# Patient Record
Sex: MALE | Race: WHITE | NOT HISPANIC OR LATINO | Employment: OTHER | URBAN - METROPOLITAN AREA
[De-identification: names, ages, dates, MRNs, and addresses within clinical notes are randomized per-mention and may not be internally consistent; named-entity substitution may affect disease eponyms.]

---

## 2017-01-05 ENCOUNTER — OFFICE VISIT - HEALTHEAST (OUTPATIENT)
Dept: INTERNAL MEDICINE | Facility: CLINIC | Age: 67
End: 2017-01-05

## 2017-01-05 DIAGNOSIS — J40 BRONCHITIS: ICD-10-CM

## 2017-02-14 ENCOUNTER — COMMUNICATION - HEALTHEAST (OUTPATIENT)
Dept: INTERNAL MEDICINE | Facility: CLINIC | Age: 67
End: 2017-02-14

## 2017-05-24 ENCOUNTER — COMMUNICATION - HEALTHEAST (OUTPATIENT)
Dept: INTERNAL MEDICINE | Facility: CLINIC | Age: 67
End: 2017-05-24

## 2017-08-28 ENCOUNTER — COMMUNICATION - HEALTHEAST (OUTPATIENT)
Dept: INTERNAL MEDICINE | Facility: CLINIC | Age: 67
End: 2017-08-28

## 2017-09-05 ENCOUNTER — COMMUNICATION - HEALTHEAST (OUTPATIENT)
Dept: INTERNAL MEDICINE | Facility: CLINIC | Age: 67
End: 2017-09-05

## 2017-09-20 ENCOUNTER — OFFICE VISIT - HEALTHEAST (OUTPATIENT)
Dept: INTERNAL MEDICINE | Facility: CLINIC | Age: 67
End: 2017-09-20

## 2017-09-20 DIAGNOSIS — I25.10 CORONARY ARTERY DISEASE INVOLVING NATIVE CORONARY ARTERY: ICD-10-CM

## 2017-09-20 DIAGNOSIS — I10 ESSENTIAL HYPERTENSION WITH GOAL BLOOD PRESSURE LESS THAN 140/90: ICD-10-CM

## 2017-09-20 DIAGNOSIS — E78.2 MIXED HYPERLIPIDEMIA: ICD-10-CM

## 2017-09-20 DIAGNOSIS — Z95.5 HISTORY OF CORONARY ARTERY STENT PLACEMENT: ICD-10-CM

## 2017-09-20 DIAGNOSIS — Z23 NEED FOR VACCINATION: ICD-10-CM

## 2017-09-20 LAB
CHOLEST SERPL-MCNC: 159 MG/DL
FASTING STATUS PATIENT QL REPORTED: YES
HDLC SERPL-MCNC: 27 MG/DL
LDLC SERPL CALC-MCNC: 107 MG/DL
TRIGL SERPL-MCNC: 127 MG/DL

## 2017-09-20 ASSESSMENT — MIFFLIN-ST. JEOR: SCORE: 1635.36

## 2017-09-21 ENCOUNTER — COMMUNICATION - HEALTHEAST (OUTPATIENT)
Dept: INTERNAL MEDICINE | Facility: CLINIC | Age: 67
End: 2017-09-21

## 2017-10-17 ENCOUNTER — COMMUNICATION - HEALTHEAST (OUTPATIENT)
Dept: SCHEDULING | Facility: CLINIC | Age: 67
End: 2017-10-17

## 2017-10-18 ENCOUNTER — OFFICE VISIT - HEALTHEAST (OUTPATIENT)
Dept: INTERNAL MEDICINE | Facility: CLINIC | Age: 67
End: 2017-10-18

## 2017-10-18 DIAGNOSIS — J06.9 URI (UPPER RESPIRATORY INFECTION): ICD-10-CM

## 2017-10-18 DIAGNOSIS — M79.10 MYALGIA: ICD-10-CM

## 2017-10-18 DIAGNOSIS — R05.9 COUGH: ICD-10-CM

## 2017-10-18 ASSESSMENT — MIFFLIN-ST. JEOR: SCORE: 1626.29

## 2017-11-28 ENCOUNTER — COMMUNICATION - HEALTHEAST (OUTPATIENT)
Dept: INTERNAL MEDICINE | Facility: CLINIC | Age: 67
End: 2017-11-28

## 2017-11-28 DIAGNOSIS — I25.10 CORONARY ARTERY DISEASE INVOLVING NATIVE CORONARY ARTERY: ICD-10-CM

## 2017-11-28 DIAGNOSIS — I10 ESSENTIAL HYPERTENSION: ICD-10-CM

## 2017-11-28 DIAGNOSIS — E78.2 MIXED HYPERLIPIDEMIA: ICD-10-CM

## 2017-11-28 DIAGNOSIS — Z95.5 HISTORY OF CORONARY ARTERY STENT PLACEMENT: ICD-10-CM

## 2018-01-25 ENCOUNTER — RECORDS - HEALTHEAST (OUTPATIENT)
Dept: ADMINISTRATIVE | Facility: OTHER | Age: 68
End: 2018-01-25

## 2018-02-05 ENCOUNTER — AMBULATORY - HEALTHEAST (OUTPATIENT)
Dept: INTERNAL MEDICINE | Facility: CLINIC | Age: 68
End: 2018-02-05

## 2018-02-05 ENCOUNTER — OFFICE VISIT - HEALTHEAST (OUTPATIENT)
Dept: INTERNAL MEDICINE | Facility: CLINIC | Age: 68
End: 2018-02-05

## 2018-02-05 DIAGNOSIS — F17.200 SMOKER: ICD-10-CM

## 2018-02-05 DIAGNOSIS — Z95.5 HISTORY OF CORONARY ARTERY STENT PLACEMENT: ICD-10-CM

## 2018-02-05 DIAGNOSIS — I10 ESSENTIAL HYPERTENSION: ICD-10-CM

## 2018-02-05 DIAGNOSIS — Z01.818 PREOP EXAM FOR INTERNAL MEDICINE: ICD-10-CM

## 2018-02-05 LAB
ATRIAL RATE - MUSE: 62 BPM
DIASTOLIC BLOOD PRESSURE - MUSE: NORMAL MMHG
INTERPRETATION ECG - MUSE: NORMAL
P AXIS - MUSE: 68 DEGREES
PR INTERVAL - MUSE: 144 MS
QRS DURATION - MUSE: 98 MS
QT - MUSE: 420 MS
QTC - MUSE: 426 MS
R AXIS - MUSE: 64 DEGREES
SYSTOLIC BLOOD PRESSURE - MUSE: NORMAL MMHG
T AXIS - MUSE: 44 DEGREES
VENTRICULAR RATE- MUSE: 62 BPM

## 2018-02-05 RX ORDER — ASPIRIN 325 MG
81 TABLET ORAL DAILY
Status: SHIPPED | COMMUNITY
Start: 2018-02-05 | End: 2022-11-14

## 2018-02-05 ASSESSMENT — MIFFLIN-ST. JEOR: SCORE: 1635.36

## 2018-02-22 ENCOUNTER — RECORDS - HEALTHEAST (OUTPATIENT)
Dept: ADMINISTRATIVE | Facility: OTHER | Age: 68
End: 2018-02-22

## 2018-04-10 ENCOUNTER — RECORDS - HEALTHEAST (OUTPATIENT)
Dept: ADMINISTRATIVE | Facility: OTHER | Age: 68
End: 2018-04-10

## 2018-09-14 ENCOUNTER — COMMUNICATION - HEALTHEAST (OUTPATIENT)
Dept: INTERNAL MEDICINE | Facility: CLINIC | Age: 68
End: 2018-09-14

## 2018-09-14 DIAGNOSIS — I25.10 CORONARY ARTERY DISEASE INVOLVING NATIVE CORONARY ARTERY: ICD-10-CM

## 2018-09-14 DIAGNOSIS — Z95.5 HISTORY OF CORONARY ARTERY STENT PLACEMENT: ICD-10-CM

## 2018-10-17 ENCOUNTER — COMMUNICATION - HEALTHEAST (OUTPATIENT)
Dept: INTERNAL MEDICINE | Facility: CLINIC | Age: 68
End: 2018-10-17

## 2018-10-17 DIAGNOSIS — Z95.5 HISTORY OF CORONARY ARTERY STENT PLACEMENT: ICD-10-CM

## 2018-10-17 DIAGNOSIS — I25.10 CORONARY ARTERY DISEASE INVOLVING NATIVE CORONARY ARTERY: ICD-10-CM

## 2018-12-11 ENCOUNTER — COMMUNICATION - HEALTHEAST (OUTPATIENT)
Dept: INTERNAL MEDICINE | Facility: CLINIC | Age: 68
End: 2018-12-11

## 2018-12-11 DIAGNOSIS — E78.2 MIXED HYPERLIPIDEMIA: ICD-10-CM

## 2018-12-11 DIAGNOSIS — I25.10 CORONARY ARTERY DISEASE INVOLVING NATIVE CORONARY ARTERY: ICD-10-CM

## 2018-12-11 DIAGNOSIS — Z95.5 HISTORY OF CORONARY ARTERY STENT PLACEMENT: ICD-10-CM

## 2018-12-11 DIAGNOSIS — I10 ESSENTIAL HYPERTENSION: ICD-10-CM

## 2019-03-28 ENCOUNTER — OFFICE VISIT - HEALTHEAST (OUTPATIENT)
Dept: INTERNAL MEDICINE | Facility: CLINIC | Age: 69
End: 2019-03-28

## 2019-03-28 ENCOUNTER — COMMUNICATION - HEALTHEAST (OUTPATIENT)
Dept: INTERNAL MEDICINE | Facility: CLINIC | Age: 69
End: 2019-03-28

## 2019-03-28 ENCOUNTER — COMMUNICATION - HEALTHEAST (OUTPATIENT)
Dept: SCHEDULING | Facility: CLINIC | Age: 69
End: 2019-03-28

## 2019-03-28 DIAGNOSIS — Z12.11 SCREEN FOR COLON CANCER: ICD-10-CM

## 2019-03-28 DIAGNOSIS — J06.9 UPPER RESPIRATORY TRACT INFECTION, UNSPECIFIED TYPE: ICD-10-CM

## 2019-03-28 DIAGNOSIS — I10 ESSENTIAL HYPERTENSION: ICD-10-CM

## 2019-03-28 DIAGNOSIS — E78.2 MIXED HYPERLIPIDEMIA: ICD-10-CM

## 2019-03-28 DIAGNOSIS — I25.10 CORONARY ARTERY DISEASE INVOLVING NATIVE HEART WITHOUT ANGINA PECTORIS, UNSPECIFIED VESSEL OR LESION TYPE: ICD-10-CM

## 2019-03-28 RX ORDER — NITROGLYCERIN 0.3 MG/1
0.3 TABLET SUBLINGUAL EVERY 5 MIN PRN
Qty: 20 TABLET | Refills: 3 | Status: SHIPPED | OUTPATIENT
Start: 2019-03-28 | End: 2023-06-08

## 2019-03-28 ASSESSMENT — MIFFLIN-ST. JEOR: SCORE: 1608.14

## 2019-08-14 ENCOUNTER — COMMUNICATION - HEALTHEAST (OUTPATIENT)
Dept: INTERNAL MEDICINE | Facility: CLINIC | Age: 69
End: 2019-08-14

## 2019-08-14 DIAGNOSIS — Z95.5 HISTORY OF CORONARY ARTERY STENT PLACEMENT: ICD-10-CM

## 2019-08-14 DIAGNOSIS — I25.10 CORONARY ARTERY DISEASE INVOLVING NATIVE CORONARY ARTERY: ICD-10-CM

## 2019-11-07 ENCOUNTER — COMMUNICATION - HEALTHEAST (OUTPATIENT)
Dept: INTERNAL MEDICINE | Facility: CLINIC | Age: 69
End: 2019-11-07

## 2019-11-07 DIAGNOSIS — Z95.5 HISTORY OF CORONARY ARTERY STENT PLACEMENT: ICD-10-CM

## 2019-11-07 DIAGNOSIS — I25.10 CORONARY ARTERY DISEASE INVOLVING NATIVE CORONARY ARTERY: ICD-10-CM

## 2019-11-26 ENCOUNTER — OFFICE VISIT - HEALTHEAST (OUTPATIENT)
Dept: INTERNAL MEDICINE | Facility: CLINIC | Age: 69
End: 2019-11-26

## 2019-11-26 DIAGNOSIS — Z95.5 HISTORY OF CORONARY ARTERY STENT PLACEMENT: ICD-10-CM

## 2019-11-26 DIAGNOSIS — I10 ESSENTIAL HYPERTENSION: ICD-10-CM

## 2019-11-26 DIAGNOSIS — Z79.899 MEDICATION MANAGEMENT: ICD-10-CM

## 2019-11-26 DIAGNOSIS — Z12.11 SPECIAL SCREENING FOR MALIGNANT NEOPLASMS, COLON: ICD-10-CM

## 2019-11-26 DIAGNOSIS — E78.2 MIXED HYPERLIPIDEMIA: ICD-10-CM

## 2019-11-26 DIAGNOSIS — Z23 NEED FOR VACCINATION: ICD-10-CM

## 2019-11-26 DIAGNOSIS — Z12.5 SCREENING FOR PROSTATE CANCER: ICD-10-CM

## 2019-11-26 DIAGNOSIS — Z00.00 ROUTINE GENERAL MEDICAL EXAMINATION AT A HEALTH CARE FACILITY: ICD-10-CM

## 2019-11-26 DIAGNOSIS — Z11.59 ENCOUNTER FOR HEPATITIS C SCREENING TEST FOR LOW RISK PATIENT: ICD-10-CM

## 2019-11-26 DIAGNOSIS — J42 CHRONIC BRONCHITIS, UNSPECIFIED CHRONIC BRONCHITIS TYPE (H): ICD-10-CM

## 2019-11-26 DIAGNOSIS — F17.200 SMOKER: ICD-10-CM

## 2019-11-26 LAB
ALBUMIN SERPL-MCNC: 3.8 G/DL (ref 3.5–5)
ALBUMIN UR-MCNC: ABNORMAL MG/DL
ALP SERPL-CCNC: 88 U/L (ref 45–120)
ALT SERPL W P-5'-P-CCNC: 20 U/L (ref 0–45)
ANION GAP SERPL CALCULATED.3IONS-SCNC: 9 MMOL/L (ref 5–18)
APPEARANCE UR: CLEAR
AST SERPL W P-5'-P-CCNC: 18 U/L (ref 0–40)
BACTERIA #/AREA URNS HPF: ABNORMAL HPF
BASOPHILS # BLD AUTO: 0.2 THOU/UL (ref 0–0.2)
BASOPHILS NFR BLD AUTO: 2 % (ref 0–2)
BILIRUB SERPL-MCNC: 0.5 MG/DL (ref 0–1)
BILIRUB UR QL STRIP: NEGATIVE
BUN SERPL-MCNC: 22 MG/DL (ref 8–22)
CALCIUM SERPL-MCNC: 9.3 MG/DL (ref 8.5–10.5)
CHLORIDE BLD-SCNC: 105 MMOL/L (ref 98–107)
CHOLEST SERPL-MCNC: 149 MG/DL
CO2 SERPL-SCNC: 27 MMOL/L (ref 22–31)
COLOR UR AUTO: YELLOW
CREAT SERPL-MCNC: 1.26 MG/DL (ref 0.7–1.3)
EOSINOPHIL # BLD AUTO: 0.3 THOU/UL (ref 0–0.4)
EOSINOPHIL NFR BLD AUTO: 4 % (ref 0–6)
ERYTHROCYTE [DISTWIDTH] IN BLOOD BY AUTOMATED COUNT: 15.1 % (ref 11–14.5)
FASTING STATUS PATIENT QL REPORTED: YES
GFR SERPL CREATININE-BSD FRML MDRD: 57 ML/MIN/1.73M2
GLUCOSE BLD-MCNC: 116 MG/DL (ref 70–125)
GLUCOSE UR STRIP-MCNC: NEGATIVE MG/DL
HCT VFR BLD AUTO: 45.1 % (ref 40–54)
HDLC SERPL-MCNC: 34 MG/DL
HGB BLD-MCNC: 13.8 G/DL (ref 14–18)
HGB UR QL STRIP: NEGATIVE
KETONES UR STRIP-MCNC: NEGATIVE MG/DL
LDLC SERPL CALC-MCNC: 91 MG/DL
LEUKOCYTE ESTERASE UR QL STRIP: NEGATIVE
LYMPHOCYTES # BLD AUTO: 1.8 THOU/UL (ref 0.8–4.4)
LYMPHOCYTES NFR BLD AUTO: 21 % (ref 20–40)
MCH RBC QN AUTO: 28 PG (ref 27–34)
MCHC RBC AUTO-ENTMCNC: 30.6 G/DL (ref 32–36)
MCV RBC AUTO: 92 FL (ref 80–100)
MONOCYTES # BLD AUTO: 0.7 THOU/UL (ref 0–0.9)
MONOCYTES NFR BLD AUTO: 9 % (ref 2–10)
MUCOUS THREADS #/AREA URNS LPF: ABNORMAL LPF
NEUTROPHILS # BLD AUTO: 5.6 THOU/UL (ref 2–7.7)
NEUTROPHILS NFR BLD AUTO: 65 % (ref 50–70)
NITRATE UR QL: NEGATIVE
PH UR STRIP: 5.5 [PH] (ref 4.5–8)
PLATELET # BLD AUTO: 192 THOU/UL (ref 140–440)
PMV BLD AUTO: 10.7 FL (ref 8.5–12.5)
POTASSIUM BLD-SCNC: 4.9 MMOL/L (ref 3.5–5)
PROT SERPL-MCNC: 6.7 G/DL (ref 6–8)
PSA SERPL-MCNC: 6.9 NG/ML (ref 0–4.5)
RBC # BLD AUTO: 4.92 MILL/UL (ref 4.4–6.2)
RBC #/AREA URNS AUTO: ABNORMAL HPF
SODIUM SERPL-SCNC: 141 MMOL/L (ref 136–145)
SP GR UR STRIP: 1.02 (ref 1–1.03)
SQUAMOUS #/AREA URNS AUTO: ABNORMAL LPF
TRIGL SERPL-MCNC: 121 MG/DL
UROBILINOGEN UR STRIP-ACNC: ABNORMAL
WBC #/AREA URNS AUTO: ABNORMAL HPF
WBC: 8.6 THOU/UL (ref 4–11)

## 2019-11-26 ASSESSMENT — MIFFLIN-ST. JEOR: SCORE: 1629.68

## 2019-11-27 ENCOUNTER — AMBULATORY - HEALTHEAST (OUTPATIENT)
Dept: INTERNAL MEDICINE | Facility: CLINIC | Age: 69
End: 2019-11-27

## 2019-11-27 ENCOUNTER — COMMUNICATION - HEALTHEAST (OUTPATIENT)
Dept: INTERNAL MEDICINE | Facility: CLINIC | Age: 69
End: 2019-11-27

## 2019-11-27 DIAGNOSIS — R97.20 ELEVATED PROSTATE SPECIFIC ANTIGEN (PSA): ICD-10-CM

## 2019-11-27 LAB — HCV AB SERPL QL IA: NEGATIVE

## 2019-12-04 ENCOUNTER — RECORDS - HEALTHEAST (OUTPATIENT)
Dept: ADMINISTRATIVE | Facility: OTHER | Age: 69
End: 2019-12-04

## 2019-12-04 LAB — COLOGUARD-ABSTRACT: NEGATIVE

## 2019-12-18 ENCOUNTER — RECORDS - HEALTHEAST (OUTPATIENT)
Dept: HEALTH INFORMATION MANAGEMENT | Facility: CLINIC | Age: 69
End: 2019-12-18

## 2020-03-13 ENCOUNTER — COMMUNICATION - HEALTHEAST (OUTPATIENT)
Dept: INTERNAL MEDICINE | Facility: CLINIC | Age: 70
End: 2020-03-13

## 2020-03-13 DIAGNOSIS — I25.10 CORONARY ARTERY DISEASE INVOLVING NATIVE CORONARY ARTERY: ICD-10-CM

## 2020-03-13 DIAGNOSIS — Z95.5 HISTORY OF CORONARY ARTERY STENT PLACEMENT: ICD-10-CM

## 2020-06-10 ENCOUNTER — COMMUNICATION - HEALTHEAST (OUTPATIENT)
Dept: INTERNAL MEDICINE | Facility: CLINIC | Age: 70
End: 2020-06-10

## 2020-06-10 DIAGNOSIS — I10 ESSENTIAL HYPERTENSION: ICD-10-CM

## 2020-06-10 DIAGNOSIS — E78.2 MIXED HYPERLIPIDEMIA: ICD-10-CM

## 2020-09-10 ENCOUNTER — COMMUNICATION - HEALTHEAST (OUTPATIENT)
Dept: INTERNAL MEDICINE | Facility: CLINIC | Age: 70
End: 2020-09-10

## 2020-09-10 DIAGNOSIS — I25.10 CORONARY ARTERY DISEASE INVOLVING NATIVE CORONARY ARTERY: ICD-10-CM

## 2020-09-10 DIAGNOSIS — Z95.5 HISTORY OF CORONARY ARTERY STENT PLACEMENT: ICD-10-CM

## 2020-12-11 ENCOUNTER — OFFICE VISIT - HEALTHEAST (OUTPATIENT)
Dept: INTERNAL MEDICINE | Facility: CLINIC | Age: 70
End: 2020-12-11

## 2020-12-11 DIAGNOSIS — J42 CHRONIC BRONCHITIS, UNSPECIFIED CHRONIC BRONCHITIS TYPE (H): ICD-10-CM

## 2020-12-11 RX ORDER — TIOTROPIUM BROMIDE 18 UG/1
18 CAPSULE ORAL; RESPIRATORY (INHALATION) DAILY
Qty: 30 CAPSULE | Refills: 11 | Status: SHIPPED | OUTPATIENT
Start: 2020-12-11 | End: 2022-01-10

## 2021-01-05 ENCOUNTER — COMMUNICATION - HEALTHEAST (OUTPATIENT)
Dept: INTERNAL MEDICINE | Facility: CLINIC | Age: 71
End: 2021-01-05

## 2021-01-05 DIAGNOSIS — I10 ESSENTIAL HYPERTENSION: ICD-10-CM

## 2021-01-05 DIAGNOSIS — E78.2 MIXED HYPERLIPIDEMIA: ICD-10-CM

## 2021-01-06 RX ORDER — METOPROLOL SUCCINATE 50 MG/1
TABLET, EXTENDED RELEASE ORAL
Qty: 90 TABLET | Refills: 1 | Status: SHIPPED | OUTPATIENT
Start: 2021-01-06 | End: 2021-08-04

## 2021-01-06 RX ORDER — ATORVASTATIN CALCIUM 80 MG/1
TABLET, FILM COATED ORAL
Qty: 90 TABLET | Refills: 1 | Status: SHIPPED | OUTPATIENT
Start: 2021-01-06 | End: 2021-08-04

## 2021-01-08 ENCOUNTER — OFFICE VISIT - HEALTHEAST (OUTPATIENT)
Dept: INTERNAL MEDICINE | Facility: CLINIC | Age: 71
End: 2021-01-08

## 2021-01-08 DIAGNOSIS — B02.30 HERPES ZOSTER WITH OPHTHALMIC COMPLICATION, UNSPECIFIED HERPES ZOSTER EYE DISEASE: ICD-10-CM

## 2021-04-09 ENCOUNTER — COMMUNICATION - HEALTHEAST (OUTPATIENT)
Dept: INTERNAL MEDICINE | Facility: CLINIC | Age: 71
End: 2021-04-09

## 2021-04-09 DIAGNOSIS — I25.10 CORONARY ARTERY DISEASE INVOLVING NATIVE CORONARY ARTERY: ICD-10-CM

## 2021-04-09 DIAGNOSIS — Z95.5 HISTORY OF CORONARY ARTERY STENT PLACEMENT: ICD-10-CM

## 2021-04-10 RX ORDER — CLOPIDOGREL BISULFATE 75 MG/1
TABLET ORAL
Qty: 90 TABLET | Refills: 1 | Status: SHIPPED | OUTPATIENT
Start: 2021-04-10 | End: 2021-10-19

## 2021-05-27 NOTE — PROGRESS NOTES
[facility] Follow Up Note    Loy Reid   68 y.o. male    Date of Visit: 3/28/2019    Chief Complaint   Patient presents with     Cough     productive cough x2 weeks, chest tightness, shortness of breath, lightheaded, nasal congestion     Subjective  This is a 68-year-old patient of Dr. Matt Duron.  He comes in with about a 2-1/2-week history of a respiratory infection.  He started out with some head congestion and drainage.  Now he is feeling discomfort in the upper chest region with an intermittently productive cough and some questionable chills.  No fever.  He does think the symptoms have worsened a little bit in the past 2 days.  He has had a history of pneumonia and so does have some concern about recurring problems with this infection especially given the persistence of the symptoms.    ROS A comprehensive review of systems was performed and was otherwise negative    Medications, allergies, and problem list were reviewed and updated    Exam  General Appearance:   On examination his blood pressure is 104/80.  Weight is 188 pounds and height is 69 inches.  BMI is 27.76.    Heart is in a sinus rhythm with a rate of 70 and no ectopy.    No facial tenderness.    No enlarged cervical lymph nodes.    Lungs are clear.    The patient is alert and oriented x3.      Assessment/Plan  1. Screen for colon cancer  Ambulatory referral for Colonoscopy   2. Upper respiratory tract infection, unspecified type  azithromycin (ZITHROMAX Z-HEATHER) 250 MG tablet   3. Coronary artery disease involving native heart without angina pectoris, unspecified vessel or lesion type  nitroglycerin (NITROSTAT) 0.3 MG SL tablet     Persistent respiratory infection.  I do not think this is pneumonia but given the persistence of it and his past history I am going to put him on a Z-Heather.  He will follow-up with us if he is not improving.  Body Mass Index was not assessed due to Patient was in with an acute medical  issue..    Ajay Sanches MD      Current Outpatient Medications on File Prior to Visit   Medication Sig     aspirin 325 MG tablet Take 81 mg by mouth daily.      atorvastatin (LIPITOR) 80 MG tablet TAKE 1 TABLET BY MOUTH EVERY DAY     clopidogrel (PLAVIX) 75 mg tablet TAKE 1 TABLET BY MOUTH EVERY DAY     metoprolol succinate (TOPROL-XL) 50 MG 24 hr tablet TAKE 1 TABLET BY MOUTH EVERY DAY     [DISCONTINUED] clopidogrel (PLAVIX) 75 mg tablet TAKE 1 TABLET BY MOUTH EVERY DAY     [DISCONTINUED] clopidogrel (PLAVIX) 75 mg tablet TAKE 1 TABLET BY MOUTH EVERY DAY     No current facility-administered medications on file prior to visit.      No Known Allergies  Social History     Tobacco Use     Smoking status: Current Every Day Smoker     Types: Cigarettes     Smokeless tobacco: Never Used   Substance Use Topics     Alcohol use: Not on file     Drug use: Not on file

## 2021-05-27 NOTE — TELEPHONE ENCOUNTER
Refill Approved    Rx renewed per Medication Renewal Policy. Medication was last renewed on 12/13/18.    Ov: 3/28/19    Misti Vaz, [facility] Triage/Med Refill 3/29/2019     Requested Prescriptions   Pending Prescriptions Disp Refills     metoprolol succinate (TOPROL-XL) 50 MG 24 hr tablet [Pharmacy Med Name: METOPROLOL ER SUCCINATE 50MG TABS] 90 tablet 0     Sig: TAKE 1 TABLET BY MOUTH EVERY DAY    Beta-Blockers Refill Protocol Passed - 3/28/2019  7:24 PM       Passed - PCP or prescribing provider visit in past 12 months or next 3 months    Last office visit with prescriber/PCP: 9/20/2017 Matt Duron MD OR same dept: 3/28/2019 Ajay Sanches MD OR same specialty: 3/28/2019 Ajay Sanches MD  Last physical: 2/5/2018 Last MTM visit: Visit date not found   Next visit within 3 mo: Visit date not found  Next physical within 3 mo: Visit date not found  Prescriber OR PCP: Matt Duron MD  Last diagnosis associated with med order: 1. Essential hypertension  - metoprolol succinate (TOPROL-XL) 50 MG 24 hr tablet [Pharmacy Med Name: METOPROLOL ER SUCCINATE 50MG TABS]; TAKE 1 TABLET BY MOUTH EVERY DAY  Dispense: 90 tablet; Refill: 0    2. Mixed hyperlipidemia  - atorvastatin (LIPITOR) 80 MG tablet [Pharmacy Med Name: ATORVASTATIN 80MG TABLETS]; TAKE 1 TABLET BY MOUTH EVERY DAY  Dispense: 90 tablet; Refill: 0    If protocol passes may refill for 12 months if within 3 months of last provider visit (or a total of 15 months).            Passed - Blood pressure filed in past 12 months    BP Readings from Last 1 Encounters:   03/28/19 104/80             atorvastatin (LIPITOR) 80 MG tablet [Pharmacy Med Name: ATORVASTATIN 80MG TABLETS] 90 tablet 0     Sig: TAKE 1 TABLET BY MOUTH EVERY DAY    Statins Refill Protocol (Hmg CoA Reductase Inhibitors) Passed - 3/28/2019  7:24 PM       Passed - PCP or prescribing provider visit in past 12 months     Last office visit with prescriber/PCP: 9/20/2017 Oliverio  Matt HARO MD OR same dept: 3/28/2019 Ajay Sanches MD OR same specialty: 3/28/2019 Ajay Sanches MD  Last physical: 2/5/2018 Last MTM visit: Visit date not found   Next visit within 3 mo: Visit date not found  Next physical within 3 mo: Visit date not found  Prescriber OR PCP: Matt Duron MD  Last diagnosis associated with med order: 1. Essential hypertension  - metoprolol succinate (TOPROL-XL) 50 MG 24 hr tablet [Pharmacy Med Name: METOPROLOL ER SUCCINATE 50MG TABS]; TAKE 1 TABLET BY MOUTH EVERY DAY  Dispense: 90 tablet; Refill: 0    2. Mixed hyperlipidemia  - atorvastatin (LIPITOR) 80 MG tablet [Pharmacy Med Name: ATORVASTATIN 80MG TABLETS]; TAKE 1 TABLET BY MOUTH EVERY DAY  Dispense: 90 tablet; Refill: 0    If protocol passes may refill for 12 months if within 3 months of last provider visit (or a total of 15 months).

## 2021-05-27 NOTE — TELEPHONE ENCOUNTER
RN triage   Call from pt   Pt states he has had pneumonia in the past a couple of times and he thinks it is feeling like that again for the past couple of weeks  Has  chest congestion and some nasal congestion and some cough --   No fever   Chest feels tighter at times and thinks he is wheezing at times   Yesterday had some chest pain when not coughing - with exertion   No chest pain now   Reviewed home care advice   Per protocol = should be seen  Transferred to    Faina Hilario RN [facility] RN triage        Reason for Disposition    Wheezing is present    Protocols used: COUGH-A-OH

## 2021-05-30 VITALS — WEIGHT: 194 LBS

## 2021-05-31 VITALS — WEIGHT: 194 LBS | BODY MASS INDEX: 28.73 KG/M2 | HEIGHT: 69 IN

## 2021-05-31 VITALS — HEIGHT: 69 IN | WEIGHT: 192 LBS | BODY MASS INDEX: 28.44 KG/M2

## 2021-05-31 NOTE — TELEPHONE ENCOUNTER
RN cannot approve Refill Request    RN can NOT refill this medication PCP messaged that patient is overdue for Labs. Last office visit: 9/20/2017 Matt Duron MD Last Physical: 2/5/2018 Last MTM visit: Visit date not found Last visit same specialty: 3/28/2019 Ajay Sanches MD.  Next visit within 3 mo: Visit date not found  Next physical within 3 mo: Visit date not found      Caitlynmele Jeff, [facility] Triage/Med Refill 8/14/2019    Requested Prescriptions   Pending Prescriptions Disp Refills     clopidogrel (PLAVIX) 75 mg tablet [Pharmacy Med Name: CLOPIDOGREL 75MG TABLETS] 60 tablet 0     Sig: TAKE 1 TABLET BY MOUTH EVERY DAY       Clopidogrel/Prasugrel/Ticagrelor Refill Protocol Failed - 8/14/2019 12:01 PM        Failed - Hemoglobin in past 12 months     Hemoglobin   Date Value Ref Range Status   06/16/2010 14.2 14.0 - 18.0 g/dL Final             Passed - PCP or prescribing provider visit in past 6 months       Last office visit with prescriber/PCP: Visit date not found OR same dept: 3/28/2019 Ajay Sanches MD OR same specialty: 3/28/2019 Ajay Sanches MD Last physical: Visit date not found Last MTM visit: Visit date not found     Next appt within 3 mo: Visit date not found  Next physical within 3 mo: Visit date not found  Prescriber OR PCP: Matt Duron MD  Last diagnosis associated with med order: 1. History of coronary artery stent placement  - clopidogrel (PLAVIX) 75 mg tablet [Pharmacy Med Name: CLOPIDOGREL 75MG TABLETS]; TAKE 1 TABLET BY MOUTH EVERY DAY  Dispense: 60 tablet; Refill: 0    2. Coronary artery disease involving native coronary artery  - clopidogrel (PLAVIX) 75 mg tablet [Pharmacy Med Name: CLOPIDOGREL 75MG TABLETS]; TAKE 1 TABLET BY MOUTH EVERY DAY  Dispense: 60 tablet; Refill: 0    If protocol passes may refill for 6 months if within 3 months of last provider visit (or a total of 9 months).

## 2021-06-01 VITALS — BODY MASS INDEX: 28.73 KG/M2 | WEIGHT: 194 LBS | HEIGHT: 69 IN

## 2021-06-02 ENCOUNTER — RECORDS - HEALTHEAST (OUTPATIENT)
Dept: ADMINISTRATIVE | Facility: CLINIC | Age: 71
End: 2021-06-02

## 2021-06-02 VITALS — BODY MASS INDEX: 27.85 KG/M2 | WEIGHT: 188 LBS | HEIGHT: 69 IN

## 2021-06-03 NOTE — TELEPHONE ENCOUNTER
RN cannot approve Refill Request    RN can NOT refill this medication Protocol failed and NO refill given.       Celine Thorpe, [facility] Triage/Med Refill 11/8/2019    Requested Prescriptions   Pending Prescriptions Disp Refills     clopidogrel (PLAVIX) 75 mg tablet [Pharmacy Med Name: CLOPIDOGREL 75MG TABLETS] 90 tablet 3     Sig: TAKE 1 TABLET BY MOUTH EVERY DAY       Clopidogrel/Prasugrel/Ticagrelor Refill Protocol Failed - 11/7/2019  7:48 PM        Failed - PCP or prescribing provider visit in past 6 months       Last office visit with prescriber/PCP: Visit date not found OR same dept: 3/28/2019 Ajay Sanches MD OR same specialty: 3/28/2019 Ajay Sanches MD Last physical: Visit date not found Last MTM visit: Visit date not found     Next appt within 3 mo: Visit date not found  Next physical within 3 mo: Visit date not found  Prescriber OR PCP: Matt Duron MD  Last diagnosis associated with med order: 1. History of coronary artery stent placement  - clopidogrel (PLAVIX) 75 mg tablet [Pharmacy Med Name: CLOPIDOGREL 75MG TABLETS]; TAKE 1 TABLET BY MOUTH EVERY DAY  Dispense: 60 tablet; Refill: 0    2. Coronary artery disease involving native coronary artery  - clopidogrel (PLAVIX) 75 mg tablet [Pharmacy Med Name: CLOPIDOGREL 75MG TABLETS]; TAKE 1 TABLET BY MOUTH EVERY DAY  Dispense: 60 tablet; Refill: 0    If protocol passes may refill for 6 months if within 3 months of last provider visit (or a total of 9 months).              Failed - Hemoglobin in past 12 months     Hemoglobin   Date Value Ref Range Status   06/16/2010 14.2 14.0 - 18.0 g/dL Final

## 2021-06-03 NOTE — PATIENT INSTRUCTIONS - HE
Patient Education   Personalized Prevention Plan  You are due for the preventive services outlined below.  Your care team is available to assist you in scheduling these services.  If you have already completed any of these items, please share that information with your care team to update in your medical record.  Health Maintenance   Topic Date Due     HEPATITIS C SCREENING  1950     COLONOSCOPY  08/20/2001     ZOSTER VACCINES (2 of 3) 03/07/2013     MEDICARE ANNUAL WELLNESS VISIT  08/20/2016     PNEUMOCOCCAL IMMUNIZATION 65+ LOW/MEDIUM RISK (2 of 2 - PPSV23) 08/20/2016     INFLUENZA VACCINE RULE BASED (1) 08/01/2019     FALL RISK ASSESSMENT  03/28/2020     LIPID  09/20/2022     ADVANCE CARE PLANNING  09/20/2022     TD 18+ HE  06/29/2025

## 2021-06-03 NOTE — PROGRESS NOTES
Assessment and Plan:   69-year-old man for annual wellness exam    1. Routine general medical examination at a health care facility  Does have a congested sounding cough.  Has gained some weight since quitting smoking.    2. Smoker  Quit smoking 5 weeks ago.  He does have a chronic cough.  We will check an FEV1.    3. Mixed hyperlipidemia  No angina claudication or TIAs.  He does take Lipitor 80 mg.  - Lipid Cascade    4. History of coronary artery stent placement  No chest pain with exercise.    5. Essential hypertension  Today's blood pressure 138/76.  - Urinalysis-UC if Indicated    6. Medication management  No trouble with current medications.  No bleeding  - HM1(CBC and Differential)  - Comprehensive Metabolic Panel  - HM1 (CBC with Diff)    7. Screening for prostate cancer  Prostate feels firm.  Check PSA.  He has some urgency.  - PSA (Prostatic-Specific Antigen), Annual Screen    8. Encounter for hepatitis C screening test for low risk patient  No history for hepatitis  - Hepatitis C Antibody (Anti-HCV)    9. Need for vaccination  Need of influenza  - Influenza High Dose, Seasonal 65+ yrs    10. Special screening for malignant neoplasms, colon  Never had a colonoscopy or Cologuard.  Because of his COPD.  We will do a Cologuard test.  - Cologuard    11. Chronic bronchitis, unspecified chronic bronchitis type (H)  Spirometry.  I suspect he has significant COPD.  - Spirometry without bronchodilator        The patient's current medical problems were reviewed.    I have had an Advance Directives discussion with the patient.  The following health maintenance schedule was reviewed with the patient and provided in printed form in the after visit summary:   Health Maintenance   Topic Date Due     HEPATITIS C SCREENING  1950     COLONOSCOPY  08/20/2001     ZOSTER VACCINES (2 of 3) 03/07/2013     MEDICARE ANNUAL WELLNESS VISIT  08/20/2016     PNEUMOCOCCAL IMMUNIZATION 65+ LOW/MEDIUM RISK (2 of 2 - PPSV23)  08/20/2016     INFLUENZA VACCINE RULE BASED (1) 08/01/2019     FALL RISK ASSESSMENT  03/28/2020     LIPID  09/20/2022     ADVANCE CARE PLANNING  09/20/2022     TD 18+ HE  06/29/2025        Subjective:   Chief Complaint: Loy Reid is an 69 y.o. male here for an Annual Wellness visit.   HPI: 69-year-old man for annual visit and exam.  Positive for shortness of breath with exertion.  Positive chronic cough with sputum.  Past history of stent placement in the coronary arteries.  No true angina at this time.  No claudication.  No dysphasia.  No change in bowel habits.  Never had a colonoscopy.  Some urinary urgency.  Nocturia x1.  No hematuria  Denies any significant musculoskeletal issues except for foot pain from metatarsalgia.  Has seen a podiatrist.  No TIAs, seizures, migraine, neuropathy.  Rest the system review is negative      Review of Systems:    Please see above.  The rest of the review of systems are negative for all systems.    Patient Care Team:  Matt Duron MD as PCP - General (Internal Medicine)  Ajay Sanches MD as Assigned PCP     Patient Active Problem List   Diagnosis     Coronary artery disease involving native coronary artery     History of coronary artery stent placement     Mixed hyperlipidemia     Essential hypertension     Smoker     Past Medical History:   Diagnosis Date     Myocardial infarction (H)      PVD (peripheral vascular disease) (H)      Tobacco use       Past Surgical History:   Procedure Laterality Date     FOOT FRACTURE SURGERY        Family History   Problem Relation Age of Onset     Stroke Mother      Heart attack Mother      Stroke Father      Heart disease Father      Heart attack Sister      Heart disease Brother       Social History     Socioeconomic History     Marital status:      Spouse name: Not on file     Number of children: Not on file     Years of education: Not on file     Highest education level: Not on file   Occupational History     Not on  file   Social Needs     Financial resource strain: Not on file     Food insecurity:     Worry: Not on file     Inability: Not on file     Transportation needs:     Medical: Not on file     Non-medical: Not on file   Tobacco Use     Smoking status: Current Every Day Smoker     Types: Cigarettes     Smokeless tobacco: Never Used   Substance and Sexual Activity     Alcohol use: Not on file     Drug use: Not on file     Sexual activity: Not on file   Lifestyle     Physical activity:     Days per week: Not on file     Minutes per session: Not on file     Stress: Not on file   Relationships     Social connections:     Talks on phone: Not on file     Gets together: Not on file     Attends [employer] service: Not on file     Active member of club or organization: Not on file     Attends meetings of clubs or organizations: Not on file     Relationship status: Not on file     Intimate partner violence:     Fear of current or ex partner: Not on file     Emotionally abused: Not on file     Physically abused: Not on file     Forced sexual activity: Not on file   Other Topics Concern     Not on file   Social History Narrative     Not on file      Current Outpatient Medications   Medication Sig Dispense Refill     aspirin 325 MG tablet Take 81 mg by mouth daily.        atorvastatin (LIPITOR) 80 MG tablet TAKE 1 TABLET BY MOUTH EVERY DAY 90 tablet 3     clopidogrel (PLAVIX) 75 mg tablet TAKE 1 TABLET BY MOUTH EVERY DAY 60 tablet 0     clopidogrel (PLAVIX) 75 mg tablet TAKE 1 TABLET BY MOUTH EVERY DAY 90 tablet 0     metoprolol succinate (TOPROL-XL) 50 MG 24 hr tablet TAKE 1 TABLET BY MOUTH EVERY DAY 90 tablet 3     nitroglycerin (NITROSTAT) 0.3 MG SL tablet Place 1 tablet (0.3 mg total) under the tongue every 5 (five) minutes as needed for chest pain. 20 tablet 3     No current facility-administered medications for this visit.       Objective:   Vital Signs: There were no vitals taken for this visit.     VisionScreening:  No exam  data present     PHYSICAL EXAM  Does not man who is gained 10 pounds since stopping smoking.  Blood pressure 138/76.  Pulse 84.  EYES: Eyelids, conjunctiva, and sclera were normal. Pupils were normal. Cornea, iris, and lens were normal bilaterally.  Bilateral cataract surgery in the past.  HEAD, EARS, NOSE, MOUTH, AND THROAT: Head and face were normal. Hearing was normal to voice and the ears were normal to external exam. Nose appearance was normal and there was no discharge. Oropharynx was normal.  NECK: Neck appearance was normal. There were no neck masses and the thyroid was not enlarged.  No bruits.  RESPIRATORY: Breathing pattern was normal and the chest moved symmetrically.  Percussion/auscultatory percussion was normal.  Lung sounds are somewhat congested.  Congested sounding cough.  Consistent with COPD.  No true wheezing today.  CARDIOVASCULAR: Heart rate and rhythm were normal.  S1 and S2 were normal and there were no extra sounds or murmurs. Peripheral pulses in arms and legs were normal.  Jugular venous pressure was normal.  There was no peripheral edema.  GASTROINTESTINAL: The abdomen was normal in contour.  Bowel sounds were present.  Percussion detected no organ enlargement or tenderness.  Palpation detected no tenderness, mass, or enlarged organs.   MUSCULOSKELETAL: Skeletal configuration was normal and muscle mass was normal for age. Joint appearance was overall normal.  LYMPHATIC: There were no enlarged nodes.  Some OA changes of the feet and hands.  SKIN/HAIR/NAILS: Skin color was normal.  There were no skin lesions.  Hair and nails were normal.  NEUROLOGIC: The patient was alert and oriented to person, place, time, and circumstance. Speech was normal. Cranial nerves were normal. Motor strength was normal for age. The patient was normally coordinated.  PSYCHIATRIC:  Mood and affect were normal and the patient had normal recent and remote memory. The patient's judgment and insight were  normal.    ADDITIONAL VITAL SIGNS: Pulse 84  CHEST WALL/BREASTS: Normal male  RECTAL: Some external hemorrhoid tags.  No masses.  Prostate 2+ enlarged.  GENITAL/URINARY: Normal male        No flowsheet data found.  A Mini-Cog score of 0-2 suggests the possibility of dementia, score of 3-5 suggests no dementia    Identified Health Risks:

## 2021-06-04 VITALS
BODY MASS INDEX: 30.01 KG/M2 | HEART RATE: 84 BPM | HEIGHT: 68 IN | SYSTOLIC BLOOD PRESSURE: 138 MMHG | DIASTOLIC BLOOD PRESSURE: 76 MMHG | WEIGHT: 198 LBS

## 2021-06-06 NOTE — TELEPHONE ENCOUNTER
Refill Approved    Rx renewed per Medication Renewal Policy. Medication was last renewed on 11/12/19.    Celine Thorpe, [facility] Triage/Med Refill 3/15/2020     Requested Prescriptions   Pending Prescriptions Disp Refills     clopidogreL (PLAVIX) 75 mg tablet [Pharmacy Med Name: CLOPIDOGREL 75MG TABLETS] 90 tablet 0     Sig: TAKE 1 TABLET BY MOUTH EVERY DAY       Clopidogrel/Prasugrel/Ticagrelor Refill Protocol Passed - 3/13/2020 11:16 AM        Passed - PCP or prescribing provider visit in past 6 months       Last office visit with prescriber/PCP: Visit date not found OR same dept: 3/28/2019 Ajay Sanches MD OR same specialty: 3/28/2019 Ajay Sanches MD Last physical: 11/26/2019 Last MTM visit: Visit date not found     Next appt within 3 mo: Visit date not found  Next physical within 3 mo: Visit date not found  Prescriber OR PCP: Matt Duron MD  Last diagnosis associated with med order: 1. History of coronary artery stent placement  - clopidogreL (PLAVIX) 75 mg tablet [Pharmacy Med Name: CLOPIDOGREL 75MG TABLETS]; TAKE 1 TABLET BY MOUTH EVERY DAY  Dispense: 90 tablet; Refill: 0    2. Coronary artery disease involving native coronary artery  - clopidogreL (PLAVIX) 75 mg tablet [Pharmacy Med Name: CLOPIDOGREL 75MG TABLETS]; TAKE 1 TABLET BY MOUTH EVERY DAY  Dispense: 90 tablet; Refill: 0    If protocol passes may refill for 6 months if within 3 months of last provider visit (or a total of 9 months).              Passed - Hemoglobin in past 12 months     Hemoglobin   Date Value Ref Range Status   11/26/2019 13.8 (L) 14.0 - 18.0 g/dL Final

## 2021-06-08 NOTE — TELEPHONE ENCOUNTER
Refill Approved    Rx renewed per Medication Renewal Policy. Medication was last renewed on 3/29/19.    Celine Thorpe, [facility] Connection Triage/Med Refill 6/12/2020     Requested Prescriptions   Pending Prescriptions Disp Refills     metoprolol succinate (TOPROL-XL) 50 MG 24 hr tablet [Pharmacy Med Name: METOPROLOL ER SUCCINATE 50MG TABS] 90 tablet 3     Sig: TAKE 1 TABLET BY MOUTH EVERY DAY       Beta-Blockers Refill Protocol Passed - 6/10/2020  9:07 PM        Passed - PCP or prescribing provider visit in past 12 months or next 3 months     Last office visit with prescriber/PCP: 9/20/2017 Matt Duron MD OR same dept: Visit date not found OR same specialty: 3/28/2019 Ajay Sanches MD  Last physical: 11/26/2019 Last MTM visit: Visit date not found   Next visit within 3 mo: Visit date not found  Next physical within 3 mo: Visit date not found  Prescriber OR PCP: Matt Duron MD  Last diagnosis associated with med order: 1. Essential hypertension  - metoprolol succinate (TOPROL-XL) 50 MG 24 hr tablet [Pharmacy Med Name: METOPROLOL ER SUCCINATE 50MG TABS]; TAKE 1 TABLET BY MOUTH EVERY DAY  Dispense: 90 tablet; Refill: 3    2. Mixed hyperlipidemia  - atorvastatin (LIPITOR) 80 MG tablet [Pharmacy Med Name: ATORVASTATIN 80MG TABLETS]; TAKE 1 TABLET BY MOUTH EVERY DAY  Dispense: 90 tablet; Refill: 3    If protocol passes may refill for 12 months if within 3 months of last provider visit (or a total of 15 months).             Passed - Blood pressure filed in past 12 months     BP Readings from Last 1 Encounters:   11/26/19 138/76                atorvastatin (LIPITOR) 80 MG tablet [Pharmacy Med Name: ATORVASTATIN 80MG TABLETS] 90 tablet 3     Sig: TAKE 1 TABLET BY MOUTH EVERY DAY       Statins Refill Protocol (Hmg CoA Reductase Inhibitors) Passed - 6/10/2020  9:07 PM        Passed - PCP or prescribing provider visit in past 12 months      Last office visit with prescriber/PCP: 9/20/2017 Matt Duron MD OR  same dept: Visit date not found OR same specialty: 3/28/2019 Ajay Sanches MD  Last physical: 11/26/2019 Last MTM visit: Visit date not found   Next visit within 3 mo: Visit date not found  Next physical within 3 mo: Visit date not found  Prescriber OR PCP: Matt Duron MD  Last diagnosis associated with med order: 1. Essential hypertension  - metoprolol succinate (TOPROL-XL) 50 MG 24 hr tablet [Pharmacy Med Name: METOPROLOL ER SUCCINATE 50MG TABS]; TAKE 1 TABLET BY MOUTH EVERY DAY  Dispense: 90 tablet; Refill: 3    2. Mixed hyperlipidemia  - atorvastatin (LIPITOR) 80 MG tablet [Pharmacy Med Name: ATORVASTATIN 80MG TABLETS]; TAKE 1 TABLET BY MOUTH EVERY DAY  Dispense: 90 tablet; Refill: 3    If protocol passes may refill for 12 months if within 3 months of last provider visit (or a total of 15 months).

## 2021-06-08 NOTE — PROGRESS NOTES
OFFICE VISIT NOTE    Subjective:   Chief Complaint:  No chief complaint on file.    66-year-old man was a smoker.  He  Has been smoking about a pack of cigarettes per day for over 40 years.  The past  5 days he has had a respiratory infection.  He is coughing with congested mucus.  No fevers or chills.  No pleurisy.  No dyspnea.  No earache or sore throat.    Current Outpatient Prescriptions   Medication Sig     atorvastatin (LIPITOR) 80 MG tablet TAKE 1 TABLET BY MOUTH EVERY DAY     clopidogrel (PLAVIX) 75 mg tablet TAKE 1 TABLET BY MOUTH EVERY DAY     metoprolol succinate (TOPROL-XL) 50 MG 24 hr tablet TAKE 1 TABLET BY MOUTH EVERY DAY       Review of Systems:  A comprehensive review of systems is negative except for the comments above    Objective:    There were no vitals taken for this visit.  GENERAL: No acute distress.  Temperature 97.9.  Blood pressure 1/28/72.  Oxygen saturation is 98%.  Pulse 82.  ENT examination is normal without any signs of active infection.  No adenopathy in neck.  Lungs bilateral rhonchi.  No significant wheezing.  There is no obvious rales or signs of consolidation    Assessment & Plan   Loy Reid is a 66 y.o. male.    Bronchitis in a chronic smoker.  He is going to try quit smoking.  He is already down to 5 cigarettes per day.  I told him to continue to do so.  I'm putting him on Levaquin 500 mg by mouth daily.    Diagnoses and all orders for this visit:    Bronchitis        Matt Duron MD  Transcription using voice recognition software, may contain typographical errors.

## 2021-06-13 NOTE — PROGRESS NOTES
"Loy Reid is a 70 y.o. male who is being evaluated via a billable telephone visit.      The patient has been notified of following:     \"This telephone visit will be conducted via a call between you and your physician/provider. We have found that certain health care needs can be provided without the need for a physical exam.  This service lets us provide the care you need with a short phone conversation.  If a prescription is necessary we can send it directly to your pharmacy.  If lab work is needed we can place an order for that and you can then stop by our lab to have the test done at a later time.    Telephone visits are billed at different rates depending on your insurance coverage. During this emergency period, for some insurers they may be billed the same as an in-person visit.  Please reach out to your insurance provider with any questions.    If during the course of the call the physician/provider feels a telephone visit is not appropriate, you will not be charged for this service.\"    Patient has given verbal consent to a Telephone visit? Yes    What phone number would you like to be contacted at? [phone]    Patient would like to receive their AVS by AVS Preference: [place].     Heavy smoking history, COPD  Quit smoking Autumn 2019, relapsed  Have a chronic cough.    We will check an FEV1.  50 yrs, about 1 ppd  Got flu shot    COPD, Chronic bronchitis  Bothered by [place], walking half a block  Gets respiratory infection about 1 a year    Mixed hyperlipidemia  No angina claudication or TIAs.  He does take Lipitor 80 mg.  atorvastatin (LIPITOR) 80 MG tablet     History of coronary artery stents placement, 2010  aspirin 325 MG tablet    Essential hypertension  BP running OK  metoprolol succinate (TOPROL-XL) 50 MG 24 hr tablet     Screening for prostate cancer    Special screening [facility] malignant neoplasms, [facility]       Immunization History   Administered Date(s) Administered     " Influenza high dose,seasonal,PF, 65+ yrs 09/20/2017, 11/26/2019     Influenza, Seasonal, Inj PF IIV3 10/27/2015     Influenza, inj, historic,unspecified 10/28/2015     Influenza,seasonal, Inj IIV3 01/10/2013     Pneumo Conj 13-V (2010&after) 05/06/2015     Pneumo Polysac 23-V 01/01/2010     Tdap 06/29/2015     ZOSTER, LIVE 01/10/2013     Assessment/Plan:    COPD, long history of cigarette smoking, probably 50 pack years or more  We will get him started on Spiriva inhaler once a day    See me in the clinic in about 3 months  Consider beginning low-dose CT for lung cancer screening      Phone call duration:  10 minutes  López Barahona

## 2021-06-13 NOTE — PATIENT INSTRUCTIONS - HE
COPD, long history of cigarette smoking, probably 50 pack years or more  We will get him started on Spiriva inhaler once a day    See me in the clinic in about 3 months  Consider beginning low-dose CT for lung cancer screening

## 2021-06-13 NOTE — PROGRESS NOTES
Office Visit - Follow Up   Loy Reid   67 y.o. male    Date of Visit: 10/18/2017    Chief Complaint   Patient presents with     URI     x1 week,cough, congestion, complains of neck pain-swollen glands        Assessment and Plan   1. Cough  Coughing for almost a week now after coming from [place].  Had more than 20 hours flight coming from daily to here.  After arriving started to feel congested having some postnasal drip and makes  him cough.  Also has some colds.  Denies fever.  Denies shortness of breath.    2. URI (upper respiratory infection)  Has upper respiratory tract infection.  Exposed to people in the plane.  Will empirically treat him with Z-Vamshi.  - azithromycin (ZITHROMAX) 250 MG tablet; Take 2 tablets by mouth day one and 1 tablet by mouth days 2-5  Dispense: 6 tablet; Refill: 0    3. Myalgia  Also has left-sided neck and left shoulder pains.  Started after possibly sleeping in  a wrong position in the plane.  Since then has been having aches/pains on his left side of the neck and left shoulder.  Will give him cyclobenzaprine 1 tablet twice a day as needed.  Advised of possible side effect of drowsiness or lethargy.  Continue but use warm compress 3 times a day.  - cyclobenzaprine (FLEXERIL) 10 MG tablet; Take 1 tablet (10 mg total) by mouth 2 (two) times a day as needed for muscle spasms.  Dispense: 10 tablet; Refill: 0    Follow up in a week if his above complaints  persist.     History of Present Illness   This 67 y.o. old male, patient of Dr. Duron, complains of cough after coming back from [place].  Was doing well until he arrived a week ago and started to cough with some chest and nasal congestion causing postnasal drip.  Does not have sinus tenderness.  Does not have fever.  Not short of breath.  No diarrhea.  No earache and sore throat.  Complains of left-sided neck pains and left shoulder pains after arriving from [place].  Was in a long flight and tried to sleep in the plane in the wrong or  "contorted position.    Review of Systems   A 12 point comprehensive review of systems was negative except as noted..     Medications, Allergies and Problem List   Reviewed and updated             Chief Complaint   URI (x1 week,cough, congestion, complains of neck pain-swollen glands)       Patient Profile   Social History     Social History Narrative        Past Medical History   Patient Active Problem List   Diagnosis     Coronary artery disease involving native coronary artery     History of coronary artery stent placement     Mixed hyperlipidemia     Essential hypertension       Past Surgical History  He has no past surgical history on file.       Medications and Allergies   Current Outpatient Prescriptions   Medication Sig     atorvastatin (LIPITOR) 80 MG tablet TAKE 1 TABLET BY MOUTH EVERY DAY     clopidogrel (PLAVIX) 75 mg tablet TAKE 1 TABLET BY MOUTH EVERY DAY     metoprolol succinate (TOPROL-XL) 50 MG 24 hr tablet TAKE 1 TABLET BY MOUTH EVERY DAY     azithromycin (ZITHROMAX) 250 MG tablet Take 2 tablets by mouth day one and 1 tablet by mouth days 2-5     cyclobenzaprine (FLEXERIL) 10 MG tablet Take 1 tablet (10 mg total) by mouth 2 (two) times a day as needed for muscle spasms.     No Known Allergies     Family and Social History   No family history on file.     Social History   Substance Use Topics     Smoking status: Never Smoker     Smokeless tobacco: Never Used     Alcohol use None        Physical Exam       Physical Exam  /80 (Patient Site: Left Arm, Patient Position: Sitting, Cuff Size: Adult Regular)  Pulse 69  Temp 98.5  F (36.9  C) (Oral)   Ht 5' 9\" (1.753 m)  Wt 192 lb (87.1 kg)  SpO2 96%  BMI 28.35 kg/m2  General appearance: alert, appears stated age, cooperative and no distress  Head: Normocephalic, without obvious abnormality, atraumatic  Ears: normal TM's and external ear canals both ears  Nose: Nares normal. Septum midline. Mucosa normal. No drainage or sinus tenderness.  Throat: " lips, mucosa, and tongue normal; teeth and gums normal  Neck: no adenopathy, no carotid bruit, no JVD, supple, symmetrical, trachea midline and thyroid not enlarged, symmetric, no tenderness/mass/nodules  Lungs: clear to auscultation bilaterally  Heart: regular rate and rhythm, S1, S2 normal, no murmur, click, rub or gallop  Abdomen: soft, non-tender; bowel sounds normal; no masses,  no organomegaly  Extremities: extremities normal, atraumatic, no cyanosis or edema  Musculoskeletal: Mild tenderness palpation left side of the neck and left shoulder but has normal range of motion     Additional Information        Guido Villanueva MD  Internal Medicine  Contact me at [phone]     Additional Information   Current Outpatient Prescriptions   Medication Sig     atorvastatin (LIPITOR) 80 MG tablet TAKE 1 TABLET BY MOUTH EVERY DAY     clopidogrel (PLAVIX) 75 mg tablet TAKE 1 TABLET BY MOUTH EVERY DAY     metoprolol succinate (TOPROL-XL) 50 MG 24 hr tablet TAKE 1 TABLET BY MOUTH EVERY DAY     azithromycin (ZITHROMAX) 250 MG tablet Take 2 tablets by mouth day one and 1 tablet by mouth days 2-5     cyclobenzaprine (FLEXERIL) 10 MG tablet Take 1 tablet (10 mg total) by mouth 2 (two) times a day as needed for muscle spasms.     No Known Allergies  Social History   Substance Use Topics     Smoking status: Never Smoker     Smokeless tobacco: Never Used     Alcohol use None         Time: total time spent with the patient was 25 minutes of which >50% was spent in counseling and coordination of care

## 2021-06-13 NOTE — PROGRESS NOTES
"OFFICE VISIT NOTE    Subjective:   Chief Complaint:  Follow-up (medication check/ fasting had black coffee)    7-year-old man with history of ASHD.  He had coronary artery stents placed in 2010.  He has a history of hyperlipidemia as well as hypertension.  He continues to smoke 5-10 cigarettes per day.  He is without any angina.  No TIAs or claudication.    Current Outpatient Prescriptions   Medication Sig     atorvastatin (LIPITOR) 80 MG tablet TAKE 1 TABLET BY MOUTH EVERY DAY     clopidogrel (PLAVIX) 75 mg tablet TAKE 1 TABLET BY MOUTH EVERY DAY     metoprolol succinate (TOPROL-XL) 50 MG 24 hr tablet TAKE 1 TABLET BY MOUTH EVERY DAY       PSFHx: Tobacco Status:  He  reports that he has never smoked. He has never used smokeless tobacco.    Review of Systems:  A comprehensive review of systems is negative except for the comments above    Objective:    Pulse 67  Ht 5' 9\" (1.753 m)  Wt 194 lb (88 kg)  SpO2 96%  BMI 28.65 kg/m2  GENERAL: No acute distress.  Blood pressure today is good at 128/60.  Pulse 66 and regular.  Oxygen saturations 97%.  Pedal pulses normal for age.  Carotid arteries are normal without bruits.  Lungs seem clear.  Heart shows a regular rhythm without murmur gallop or rub.  The abdomen slightly obese without any masses or any organomegaly.  Neurologic exam remains normal    Assessment & Plan   Loy Reid is a 67 y.o. male.    Likely stable.  He unfortunately continues to smoke.  I once again urged him to quit.  His medicines remain the same.  I am checking blood studies today including lipid profile.    Diagnoses and all orders for this visit:    Need for vaccination  -     Influenza High Dose, Seasonal 65+ yrs    Coronary artery disease involving native coronary artery    History of coronary artery stent placement    Mixed hyperlipidemia  -     Lipid Cascade    Essential hypertension with goal blood pressure less than 140/90  -     Comprehensive Metabolic Panel        The following high " BMI interventions were performed this visit: encouragement to exercise    Matt Duron MD  Transcription using voice recognition software, may contain typographical errors.

## 2021-06-14 NOTE — PROGRESS NOTES
"Loy Reid is a 70 y.o. male who is being evaluated via a billable video visit.      How would you like to obtain your AVS? Violetahart.  If dropped from the video visit, the video invitation should be resent by: Other e-mail: [email or web address]  Will anyone else be joining your video visit? No      Video Start Time: 11:20 AM  Assessment & Plan     Herpes zoster with ophthalmic complication, unspecified herpes zoster eye disease  Video visit was performed today to address patient's concerns about \"conjunctivitis\".  Over the past 5 days, he has developed redness and swelling involving his right upper eyelid.  It is tender and itchy.  Also has seen some erythematous lesions in his forehead that he points out today during the exam.  He appears to have shingles involving the ophthalmic nerve distribution of his trigeminal nerve.  Denies any changes in his vision.  Although symptoms have been present for over 72 hours, I am recommending that he is to begin Valtrex 1 g 3 times daily for 7 days and I am urging him to get an appointment with ophthalmology ASAP.  We discussed the risk of vision loss in the setting of shingles involving his eye.  - valACYclovir (VALTREX) 1000 MG tablet  Dispense: 21 tablet; Refill: 0               No follow-ups on file.    Brennon Echeverria MD  [facility]     Loy Reid is 70 y.o. and presents to clinic today for the following health issues   HPI   New development of redness and swelling involving right upper eyelid and patient with history of coronary artery disease, hypertension and COPD.  See above for details.          Review of Systems  See above for details      Objective       Vitals:  No vitals were obtained today due to virtual visit.    Physical Exam  Redness and swelling involving right upper eyelid along with several erythematous patches involving right forehead consistent with herpes zoster            Video-Visit Details    Type of " service:  Video Visit    Video End Time (time video stopped): 11:30 AM  Originating Location (pt. Location): Home    Distant Location (provider location):  [facility]     Platform used for Video Visit: Nam Echeverria MD  Internal Medicine  [facility]

## 2021-06-15 NOTE — PROGRESS NOTES
Preoperative Consultation   Loy Reid   67 y.o.  male in for preop exam.  Has decreased vision in the right eye.  Needs right cataract surgery.    Date of visit: 2/5/2018  Physician: Matt Duron MD    This is a preoperative consultation requested by Dr. Ferreira in preparation for right cataract surgery on February 6, 2018 at [facility].  A second operation, on the left eye will be done approximately February 20.       Assessment and Plan   Loy Reid was seen in preoperative consultation in preparation for right cataract surgery..  This is a low risk surgery and the patient has slight increased risk for cardiovascular problem; please note patient does have a past history of ASHD with 2 stents placed in the past.  Currently gets dyspnea on exertion related to chronic smoking.  I do believe he is stable to have cataract surgery.  1. Preop exam for internal medicine         Patient Profile   Social History     Social History Narrative        Past Medical History   Patient Active Problem List   Diagnosis     Coronary artery disease involving native coronary artery     History of coronary artery stent placement     Mixed hyperlipidemia     Essential hypertension       Past Surgical History  He has a past surgical history that includes Foot fracture surgery.     History of Present Illness   This 67 y.o. old in for preop exam.  He needs right eye cataract surgery.    Recent antiplatelet use: yes Aspirin 81 mg daily, Plavix  Personal or family history of bleeding or clotting disorders: no  Steroid use in the past year: no  Personal or family history of difficulty with anesthesia: no  Current cardiopulmonary symptoms: yes Dyspnea on exertion.  Chronic tobacco use  Last Menstrual Period: na    Review of Systems: A comprehensive review of systems was negative except as noted.     Medications and Allergies   Current Outpatient Prescriptions   Medication Sig Dispense Refill     aspirin 325 MG tablet Take  "81 mg by mouth daily.        atorvastatin (LIPITOR) 80 MG tablet TAKE 1 TABLET BY MOUTH EVERY DAY 60 tablet 9     clopidogrel (PLAVIX) 75 mg tablet TAKE 1 TABLET BY MOUTH EVERY DAY 60 tablet 3     metoprolol succinate (TOPROL-XL) 50 MG 24 hr tablet TAKE 1 TABLET BY MOUTH EVERY DAY 60 tablet 9     No current facility-administered medications for this visit.      No Known Allergies     Family and Social History   Family History   Problem Relation Age of Onset     Stroke Mother      Heart attack Mother      Stroke Father      Heart disease Father      Heart attack Sister      Heart disease Brother         Social History   Substance Use Topics     Smoking status: Current Every Day Smoker     Types: Cigarettes     Smokeless tobacco: Never Used     Alcohol use None        Physical Exam   General Appearance:   Pleasant man in no distress.  Blood pressure 142/74.    Pulse 71  Ht 5' 9\" (1.753 m)  Wt 194 lb (88 kg)  SpO2 99%  BMI 28.65 kg/m2    EYES: Eyelids, conjunctiva, and sclera were normal. Pupils were normal. Cornea, iris, and lens were normal bilaterally.  Right eye cataract.  HEAD, EARS, NOSE, MOUTH, AND THROAT: Head and face were normal. Hearing was normal to voice and the ears were normal to external exam. Nose appearance was normal and there was no discharge. Oropharynx was normal.  NECK: Neck appearance was normal. There were no neck masses and the thyroid was not enlarged.  No bruits.  RESPIRATORY: Breathing pattern was normal and the chest moved symmetrically.  Percussion/auscultatory percussion was normal.  Lung sounds are decreased, consistent with COPD.  CARDIOVASCULAR: Heart rate and rhythm were normal.  S1 and S2 were normal and there were no extra sounds or murmurs. Peripheral pulses in arms and legs were normal.  Jugular venous pressure was normal.  There was no peripheral edema.  GASTROINTESTINAL: The abdomen was normal in contour.  Bowel sounds were present.  Percussion detected no organ enlargement " or tenderness.  Palpation detected no tenderness, mass, or enlarged organs.   MUSCULOSKELETAL: Skeletal configuration was normal and muscle mass was normal for age. Joint appearance was overall normal.  LYMPHATIC: There were no enlarged nodes.  SKIN/HAIR/NAILS: Skin color was normal.  There were no skin lesions.  Hair and nails were normal.  NEUROLOGIC: The patient was alert and oriented to person, place, time, and circumstance. Speech was normal. Cranial nerves were normal. Motor strength was normal for age. The patient was normally coordinated.  PSYCHIATRIC:  Mood and affect were normal and the patient had normal recent and remote memory. The patient's judgment and insight were normal.    ADDITIONAL VITAL SIGNS: Oxygen saturations 98%  CHEST WALL/BREASTS: Normal male  RECTAL: Not checked  GENITAL/URINARY: Not checked     Additional Tests   Laboratory: na    Radiology: na    Electrocardiogram: See copy    Total time spent with the patient today was 40 minutes of which > 50% was spent in counseling and coordination of care     Matt Duron MD  Internal Medicine  Contact me at [phone]

## 2021-06-16 PROBLEM — E78.2 MIXED HYPERLIPIDEMIA: Chronic | Status: ACTIVE | Noted: 2017-09-20

## 2021-06-16 PROBLEM — Z95.5 HISTORY OF CORONARY ARTERY STENT PLACEMENT: Chronic | Status: ACTIVE | Noted: 2017-09-20

## 2021-06-16 PROBLEM — I25.10 CORONARY ARTERY DISEASE INVOLVING NATIVE CORONARY ARTERY: Chronic | Status: ACTIVE | Noted: 2017-09-20

## 2021-06-16 PROBLEM — I10 ESSENTIAL HYPERTENSION: Chronic | Status: ACTIVE | Noted: 2017-09-20

## 2021-06-16 PROBLEM — J44.9 COPD (CHRONIC OBSTRUCTIVE PULMONARY DISEASE) (H): Status: ACTIVE | Noted: 2020-12-11

## 2021-06-16 PROBLEM — F17.200 SMOKER: Chronic | Status: ACTIVE | Noted: 2018-02-05

## 2021-06-16 NOTE — TELEPHONE ENCOUNTER
RN cannot approve Refill Request    RN can NOT refill this medication PCP messaged that patient is overdue for Labs. Last office visit: Visit date not found Last Physical: Visit date not found Last MTM visit: Visit date not found Last visit same specialty: 3/28/2019 Ajay Sanches MD.  Next visit within 3 mo: Visit date not found  Next physical within 3 mo: Visit date not found      Mae Luna, [facility] Triage/Med Refill 4/10/2021    Requested Prescriptions   Pending Prescriptions Disp Refills     clopidogreL (PLAVIX) 75 mg tablet [Pharmacy Med Name: CLOPIDOGREL 75MG TABLETS] 90 tablet 1     Sig: TAKE 1 TABLET(75 MG) BY MOUTH DAILY       Clopidogrel/Prasugrel/Ticagrelor Refill Protocol Failed - 4/9/2021  8:15 AM        Failed - Hemoglobin in past 12 months     Hemoglobin   Date Value Ref Range Status   11/26/2019 13.8 (L) 14.0 - 18.0 g/dL Final             Passed - PCP or prescribing provider visit in past 6 months       Last office visit with prescriber/PCP: Visit date not found OR same dept: Visit date not found OR same specialty: 3/28/2019 Ajay Sanches MD Last physical: Visit date not found Last MTM visit: Visit date not found     Next appt within 3 mo: Visit date not found  Next physical within 3 mo: Visit date not found  Prescriber OR PCP: Juani Gomez MD  Last diagnosis associated with med order: 1. History of coronary artery stent placement  - clopidogreL (PLAVIX) 75 mg tablet [Pharmacy Med Name: CLOPIDOGREL 75MG TABLETS]; TAKE 1 TABLET(75 MG) BY MOUTH DAILY  Dispense: 90 tablet; Refill: 1    2. Coronary artery disease involving native coronary artery  - clopidogreL (PLAVIX) 75 mg tablet [Pharmacy Med Name: CLOPIDOGREL 75MG TABLETS]; TAKE 1 TABLET(75 MG) BY MOUTH DAILY  Dispense: 90 tablet; Refill: 1    If protocol passes may refill for 6 months if within 3 months of last provider visit (or a total of 9 months).

## 2021-06-19 NOTE — LETTER
Letter by Matt Duron MD at      Author: Matt Duron MD Service: -- Author Type: --    Filed:  Encounter Date: 11/27/2019 Status: Signed         Loy Reid  [address] [facility] [address]             November 27, 2019         Dear Mr. Reid,    Below are the results from your recent visit:    Resulted Orders   Comprehensive Metabolic Panel   Result Value Ref Range    Sodium 141 136 - 145 mmol/L    Potassium 4.9 3.5 - 5.0 mmol/L    Chloride 105 98 - 107 mmol/L    CO2 27 22 - 31 mmol/L    Anion Gap, Calculation 9 5 - 18 mmol/L    Glucose 116 70 - 125 mg/dL    BUN 22 8 - 22 mg/dL    Creatinine 1.26 0.70 - 1.30 mg/dL    GFR MDRD Af Amer >60 >60 mL/min/1.73m2    GFR MDRD Non Af Amer 57 (L) >60 mL/min/1.73m2    Bilirubin, Total 0.5 0.0 - 1.0 mg/dL    Calcium 9.3 8.5 - 10.5 mg/dL    Protein, Total 6.7 6.0 - 8.0 g/dL    Albumin 3.8 3.5 - 5.0 g/dL    Alkaline Phosphatase 88 45 - 120 U/L    AST 18 0 - 40 U/L    ALT 20 0 - 45 U/L    Narrative    Fasting Glucose reference range is 70-99 mg/dL per  American Diabetes Association (ADA) guidelines.   Lipid Cascade   Result Value Ref Range    Cholesterol 149 <=199 mg/dL    Triglycerides 121 <=149 mg/dL    HDL Cholesterol 34 (L) >=40 mg/dL    LDL Calculated 91 <=129 mg/dL    Patient Fasting > 8hrs? Yes    PSA (Prostatic-Specific Antigen), Annual Screen   Result Value Ref Range    PSA 6.9 (H) 0.0 - 4.5 ng/mL    Narrative    Repeated and verified.    Method is Abbott Prostate-Specific Antigen (PSA)  Standard-WHO 1st International (90:10)   Urinalysis-UC if Indicated   Result Value Ref Range    Color, UA Yellow Colorless, Yellow, Straw, Light Yellow    Clarity, UA Clear Clear    Glucose, UA Negative Negative    Bilirubin, UA Negative Negative    Ketones, UA Negative Negative    Specific Gravity, UA 1.021 1.001 - 1.030    Blood, UA Negative Negative    pH, UA 5.5 4.5 - 8.0    Protein, UA 30 mg/dL (!) Negative mg/dL    Urobilinogen, UA <2.0 E.U./dL <2.0  E.U./dL, 2.0 E.U./dL    Nitrite, UA Negative Negative    Leukocytes, UA Negative Negative    Bacteria, UA None Seen None Seen hpf    RBC, UA 0-2 None Seen, 0-2 hpf    WBC, UA 0-5 None Seen, 0-5 hpf    Squam Epithel, UA 0-5 None Seen, 0-5 lpf    Mucus, UA Few (!) None Seen lpf    Narrative    UC not indicated   Hepatitis C Antibody (Anti-HCV)   Result Value Ref Range    Hepatitis C Ab Negative Negative   HM1 (CBC with Diff)   Result Value Ref Range    WBC 8.6 4.0 - 11.0 thou/uL    RBC 4.92 4.40 - 6.20 mill/uL    Hemoglobin 13.8 (L) 14.0 - 18.0 g/dL    Hematocrit 45.1 40.0 - 54.0 %    MCV 92 80 - 100 fL    MCH 28.0 27.0 - 34.0 pg    MCHC 30.6 (L) 32.0 - 36.0 g/dL    RDW 15.1 (H) 11.0 - 14.5 %    Platelets 192 140 - 440 thou/uL    MPV 10.7 8.5 - 12.5 fL    Neutrophils % 65 50 - 70 %    Lymphocytes % 21 20 - 40 %    Monocytes % 9 2 - 10 %    Eosinophils % 4 0 - 6 %    Basophils % 2 0 - 2 %    Neutrophils Absolute 5.6 2.0 - 7.7 thou/uL    Lymphocytes Absolute 1.8 0.8 - 4.4 thou/uL    Monocytes Absolute 0.7 0.0 - 0.9 thou/uL    Eosinophils Absolute 0.3 0.0 - 0.4 thou/uL    Basophils Absolute 0.2 0.0 - 0.2 thou/uL       Loy, recent labs are reviewed.  Electrolytes, blood sugar, kidney functions, and liver function tests, were all normal.  Lipid levels look pretty good with the bad cholesterol, LDL at 91.  Goal is to keep it well under 130.  The urinalysis was normal without signs of infection.  I screened you for hepatitis C and that was normal with no evidence of  hepatitis C infections.  Hemoglobin was fairly close to normal at 13.8.  White blood count is normal.    PSA, the blood test for prostate cancer, was high at 6.9.  This does not mean you have prostate cancer but it does bear further investigation.  I would first like to repeat PSA in about 2 weeks.  If it is still high, I would then refer you to urology for further evaluation.  Enlarging prostate can also cause elevated PSAs.    Please call with questions or  contact us using [employer].    Sincerely,        Electronically signed by Matt Duron MD

## 2021-08-03 DIAGNOSIS — E78.2 MIXED HYPERLIPIDEMIA: ICD-10-CM

## 2021-08-03 DIAGNOSIS — I10 ESSENTIAL HYPERTENSION: ICD-10-CM

## 2021-08-03 NOTE — TELEPHONE ENCOUNTER
Reason for call:    Medication   If this is a refill request, has the caller requested the refill from the pharmacy already? Yes  Will the patient be using a [facility]? No  Name of the pharmacy and phone number for the current request:   on file [facility]    Name of the medication requested:   atorvastatin (LIPITOR) 80 MG tablet     metoprolol succinate (TOPROL-XL) 50 MG 24 hr tablet    Other request:   Patient has been out for a week.    Phone number to reach patient:  Cell number on file:    Telephone Information:   Mobile [phone]       Best Time:  any    Can we leave a detailed message on this number?  YES    Travel screening: Not Applicable

## 2021-08-04 RX ORDER — ATORVASTATIN CALCIUM 80 MG/1
TABLET, FILM COATED ORAL
Qty: 90 TABLET | Refills: 3 | Status: SHIPPED | OUTPATIENT
Start: 2021-08-04 | End: 2022-11-14

## 2021-08-04 RX ORDER — METOPROLOL SUCCINATE 50 MG/1
TABLET, EXTENDED RELEASE ORAL
Qty: 90 TABLET | Refills: 3 | Status: SHIPPED | OUTPATIENT
Start: 2021-08-04 | End: 2022-11-14

## 2021-08-15 ENCOUNTER — HEALTH MAINTENANCE LETTER (OUTPATIENT)
Age: 71
End: 2021-08-15

## 2021-08-23 ENCOUNTER — TRANSFERRED RECORDS (OUTPATIENT)
Dept: HEALTH INFORMATION MANAGEMENT | Facility: CLINIC | Age: 71
End: 2021-08-23

## 2021-10-11 ENCOUNTER — HEALTH MAINTENANCE LETTER (OUTPATIENT)
Age: 71
End: 2021-10-11

## 2021-10-18 DIAGNOSIS — I25.10 CORONARY ARTERY DISEASE INVOLVING NATIVE CORONARY ARTERY: ICD-10-CM

## 2021-10-18 DIAGNOSIS — Z95.5 HISTORY OF CORONARY ARTERY STENT PLACEMENT: ICD-10-CM

## 2021-10-19 RX ORDER — CLOPIDOGREL BISULFATE 75 MG/1
TABLET ORAL
Qty: 90 TABLET | Refills: 1 | Status: SHIPPED | OUTPATIENT
Start: 2021-10-19 | End: 2022-11-14

## 2021-10-19 NOTE — TELEPHONE ENCOUNTER
"Routing refill request to provider for review/approval because:  Labs not current:  multiple  Anticoagulation protocol - route to provider.    Last Written Prescription Date:  4/10/21  Last Fill Quantity: 90,  # refills: 0   Last office visit provider:  1/8/21     Requested Prescriptions   Pending Prescriptions Disp Refills     clopidogrel (PLAVIX) 75 MG tablet [Pharmacy Med Name: CLOPIDOGREL 75MG TABLETS] 90 tablet 1     Sig: TAKE 1 TABLET(75 MG) BY MOUTH DAILY       Plavix Failed - 10/18/2021  1:42 PM        Failed - Normal HGB on file in past 12 months     Recent Labs   Lab Test 11/26/19  0848   HGB 13.8*               Failed - Normal Platelets on file in past 12 months     Recent Labs   Lab Test 11/26/19  0848                  Passed - No active PPI on record unless is Protonix        Passed - Recent (12 mo) or future (30 days) visit within the authorizing provider's specialty     Patient has had an office visit with the authorizing provider or a provider within the authorizing providers department within the previous 12 mos or has a future within next 30 days. See \"Patient Info\" tab in inbasket, or \"Choose Columns\" in Meds & Orders section of the refill encounter.              Passed - Medication is active on med list        Passed - Patient is age 18 or older             Amara Oropeza RN 10/19/21 1:29 PM  "

## 2022-01-11 DIAGNOSIS — J42 CHRONIC BRONCHITIS, UNSPECIFIED CHRONIC BRONCHITIS TYPE (H): ICD-10-CM

## 2022-01-11 RX ORDER — TIOTROPIUM BROMIDE 18 UG/1
CAPSULE ORAL; RESPIRATORY (INHALATION)
Qty: 90 CAPSULE | Refills: 3 | Status: CANCELLED | OUTPATIENT
Start: 2022-01-11

## 2022-01-11 NOTE — TELEPHONE ENCOUNTER
Refill Request  Medication name: Pending Prescriptions:                       Disp   Refills    tiotropium (SPIRIVA HANDIHALER) 18 MCG in*90 cap*3          Who prescribed the medication: Jun  Last refill on medication: 12/08/21  Requested Pharmacy: [facility]  Last appointment with PCP: 12/11/21  Next appointment: Not due

## 2022-09-25 ENCOUNTER — HEALTH MAINTENANCE LETTER (OUTPATIENT)
Age: 72
End: 2022-09-25

## 2022-11-12 DIAGNOSIS — Z95.5 HISTORY OF CORONARY ARTERY STENT PLACEMENT: ICD-10-CM

## 2022-11-12 DIAGNOSIS — E78.2 MIXED HYPERLIPIDEMIA: ICD-10-CM

## 2022-11-12 DIAGNOSIS — I10 ESSENTIAL HYPERTENSION: ICD-10-CM

## 2022-11-12 NOTE — TELEPHONE ENCOUNTER
Reason for Call:  Medication or medication refill:    Do you use a [facility]?  Name of the pharmacy and phone number for the current [facility]     Name of the medication requested: Clopidogrel Bisulfate 75 MG Oral Tablet (PLAVIX),ATORVASTATIN (LIPITOR) 80 MG TABLET] TAKE 1 TABLET BY MOUTH EVERY DAY and metoprolol succinate ER (TOPROL-XL) 50 MG 24 hr     Other request: and ASPIRIN 325 MG TABLET] Take 81 mg by mouth daily.  - Oral    Can we leave a detailed message on this number? YES    Phone number patient can be reached at: Home number on file [phone] (home)    Best Time: anytime     Call taken on 11/12/2022 at 1:20 PM by Sarah Banks

## 2022-11-14 RX ORDER — ASPIRIN 325 MG
325 TABLET ORAL DAILY
Qty: 90 TABLET | Refills: 0 | Status: SHIPPED | OUTPATIENT
Start: 2022-11-14 | End: 2022-12-08

## 2022-11-14 RX ORDER — CLOPIDOGREL BISULFATE 75 MG/1
75 TABLET ORAL DAILY
Qty: 90 TABLET | Refills: 0 | Status: SHIPPED | OUTPATIENT
Start: 2022-11-14 | End: 2022-12-08

## 2022-11-14 RX ORDER — ATORVASTATIN CALCIUM 80 MG/1
80 TABLET, FILM COATED ORAL DAILY
Qty: 90 TABLET | Refills: 0 | Status: SHIPPED | OUTPATIENT
Start: 2022-11-14 | End: 2022-12-08

## 2022-11-14 RX ORDER — METOPROLOL SUCCINATE 50 MG/1
50 TABLET, EXTENDED RELEASE ORAL DAILY
Qty: 90 TABLET | Refills: 0 | Status: SHIPPED | OUTPATIENT
Start: 2022-11-14 | End: 2022-12-08

## 2022-11-14 NOTE — TELEPHONE ENCOUNTER
"Outpatient Medication Detail     Disp Refills Start End HUMAIRA   aspirin 325 MG tablet   2/5/2018  No   Sig - Route: [ASPIRIN 325 MG TABLET] Take 81 mg by mouth daily.  - Oral   Class: Historical   Order: [id number]     Routing refill request to provider for review/approval because:  A break in medication  Labs not current:  Multiple  Medication is reported/historical    Last Written Prescription Date:  10/19/21  Last Fill Quantity: 90,  # refills: 1   Last office visit provider:  1/8/21     Last Written Prescription Date:  8/4/21  Last Fill Quantity: 90,  # refills: 3   Last office visit provider:  1/8/21    Requested Prescriptions   Pending Prescriptions Disp Refills     clopidogrel (PLAVIX) 75 MG tablet 90 tablet 1     Sig: Take 1 tablet (75 mg) by mouth daily       Plavix Failed - 11/14/2022 12:21 PM        Failed - Normal HGB on file in past 12 months     Recent Labs   Lab Test 11/26/19  0848   HGB 13.8*               Failed - Normal Platelets on file in past 12 months     Recent Labs   Lab Test 11/26/19  0848                  Passed - No active PPI on record unless is Protonix        Passed - Recent (12 mo) or future (30 days) visit within the authorizing provider's specialty     Patient has had an office visit with the authorizing provider or a provider within the authorizing providers department within the previous 12 mos or has a future within next 30 days. See \"Patient Info\" tab in inbasket, or \"Choose Columns\" in Meds & Orders section of the refill encounter.              Passed - Medication is active on med list        Passed - Patient is age 18 or older           atorvastatin (LIPITOR) 80 MG tablet 90 tablet 3     Sig: [ATORVASTATIN (LIPITOR) 80 MG TABLET] TAKE 1 TABLET BY MOUTH EVERY DAY       Statins Protocol Failed - 11/14/2022 12:21 PM        Failed - LDL on file in past 12 months     Recent Labs   Lab Test 11/26/19  0848   LDL 91             Passed - No abnormal creatine kinase in past 12 months " "    No lab results found.             Passed - Recent (12 mo) or future (30 days) visit within the authorizing provider's specialty     Patient has had an office visit with the authorizing provider or a provider within the authorizing providers department within the previous 12 mos or has a future within next 30 days. See \"Patient Info\" tab in inbasket, or \"Choose Columns\" in Meds & Orders section of the refill encounter.              Passed - Medication is active on med list        Passed - Patient is age 18 or older           aspirin (ASA) 325 MG tablet       Sig: Take 1 tablet (325 mg) by mouth daily       Analgesics (Non-Narcotic Tylenol and ASA Only) Passed - 11/14/2022 12:24 PM        Passed - Recent (12 mo) or future (30 days) visit within the authorizing provider's specialty     Patient has had an office visit with the authorizing provider or a provider within the authorizing providers department within the previous 12 mos or has a future within next 30 days. See \"Patient Info\" tab in inbasket, or \"Choose Columns\" in Meds & Orders section of the refill encounter.              Passed - Patient is age 20 years or older     If ASA is flagged for ages under 20 years old. Forward to provider for confirmation Ryes Syndrome is not a concern.              Passed - Medication is active on med list           metoprolol succinate ER (TOPROL XL) 50 MG 24 hr tablet 90 tablet 3     Sig: [METOPROLOL SUCCINATE (TOPROL-XL) 50 MG 24 HR TABLET] TAKE 1 TABLET BY MOUTH EVERY DAY       Beta-Blockers Protocol Failed - 11/14/2022 12:21 PM        Failed - Blood pressure under 140/90 in past 12 months     BP Readings from Last 3 Encounters:   11/26/19 138/76                 Passed - Patient is age 6 or older        Passed - Recent (12 mo) or future (30 days) visit within the authorizing provider's specialty     Patient has had an office visit with the authorizing provider or a provider within the authorizing providers department " "within the previous 12 mos or has a future within next 30 days. See \"Patient Info\" tab in inbasket, or \"Choose Columns\" in Meds & Orders section of the refill encounter.              Passed - Medication is active on med list             Pete St RN 11/14/22 12:24 PM  "

## 2022-12-08 ENCOUNTER — OFFICE VISIT (OUTPATIENT)
Dept: INTERNAL MEDICINE | Facility: CLINIC | Age: 72
End: 2022-12-08
Payer: MEDICARE

## 2022-12-08 VITALS
BODY MASS INDEX: 31.37 KG/M2 | RESPIRATION RATE: 20 BRPM | HEART RATE: 55 BPM | SYSTOLIC BLOOD PRESSURE: 162 MMHG | DIASTOLIC BLOOD PRESSURE: 78 MMHG | WEIGHT: 203.3 LBS | OXYGEN SATURATION: 97 % | TEMPERATURE: 97.7 F

## 2022-12-08 DIAGNOSIS — E03.8 OTHER SPECIFIED HYPOTHYROIDISM: ICD-10-CM

## 2022-12-08 DIAGNOSIS — N40.1 BENIGN PROSTATIC HYPERPLASIA WITH URINARY FREQUENCY: ICD-10-CM

## 2022-12-08 DIAGNOSIS — R35.0 BENIGN PROSTATIC HYPERPLASIA WITH URINARY FREQUENCY: ICD-10-CM

## 2022-12-08 DIAGNOSIS — Z95.5 HISTORY OF CORONARY ARTERY STENT PLACEMENT: Primary | ICD-10-CM

## 2022-12-08 DIAGNOSIS — Z23 INFLUENZA VACCINATION ADMINISTERED AT CURRENT VISIT: ICD-10-CM

## 2022-12-08 DIAGNOSIS — R31.29 MICROHEMATURIA: ICD-10-CM

## 2022-12-08 DIAGNOSIS — E03.9 HYPOTHYROIDISM, UNSPECIFIED TYPE: ICD-10-CM

## 2022-12-08 DIAGNOSIS — E78.2 MIXED HYPERLIPIDEMIA: ICD-10-CM

## 2022-12-08 DIAGNOSIS — Z72.0 TOBACCO ABUSE: ICD-10-CM

## 2022-12-08 DIAGNOSIS — Z86.39 PERSONAL HISTORY OF OTHER ENDOCRINE, NUTRITIONAL AND METABOLIC DISEASE: ICD-10-CM

## 2022-12-08 DIAGNOSIS — I10 ESSENTIAL HYPERTENSION: ICD-10-CM

## 2022-12-08 DIAGNOSIS — E66.09 CLASS 1 OBESITY DUE TO EXCESS CALORIES WITHOUT SERIOUS COMORBIDITY WITH BODY MASS INDEX (BMI) OF 31.0 TO 31.9 IN ADULT: ICD-10-CM

## 2022-12-08 DIAGNOSIS — Z12.5 SCREENING PSA (PROSTATE SPECIFIC ANTIGEN): ICD-10-CM

## 2022-12-08 DIAGNOSIS — R97.20 ELEVATED PROSTATE SPECIFIC ANTIGEN (PSA): ICD-10-CM

## 2022-12-08 DIAGNOSIS — Z12.11 SPECIAL SCREENING FOR MALIGNANT NEOPLASMS, COLON: ICD-10-CM

## 2022-12-08 DIAGNOSIS — E66.811 CLASS 1 OBESITY DUE TO EXCESS CALORIES WITHOUT SERIOUS COMORBIDITY WITH BODY MASS INDEX (BMI) OF 31.0 TO 31.9 IN ADULT: ICD-10-CM

## 2022-12-08 DIAGNOSIS — Z23 HIGH PRIORITY FOR COVID-19 VACCINATION: ICD-10-CM

## 2022-12-08 DIAGNOSIS — Z87.891 PERSONAL HISTORY OF NICOTINE DEPENDENCE: ICD-10-CM

## 2022-12-08 LAB
ALBUMIN SERPL BCG-MCNC: 4.2 G/DL (ref 3.5–5.2)
ALBUMIN UR-MCNC: 100 MG/DL
ALP SERPL-CCNC: 92 U/L (ref 40–129)
ALT SERPL W P-5'-P-CCNC: 11 U/L (ref 10–50)
ANION GAP SERPL CALCULATED.3IONS-SCNC: 14 MMOL/L (ref 7–15)
APPEARANCE UR: CLEAR
AST SERPL W P-5'-P-CCNC: 27 U/L (ref 10–50)
BILIRUB SERPL-MCNC: 0.5 MG/DL
BILIRUB UR QL STRIP: ABNORMAL
BUN SERPL-MCNC: 22.8 MG/DL (ref 8–23)
CALCIUM SERPL-MCNC: 9.1 MG/DL (ref 8.8–10.2)
CHLORIDE SERPL-SCNC: 101 MMOL/L (ref 98–107)
CHOLEST SERPL-MCNC: 146 MG/DL
COLOR UR AUTO: YELLOW
CREAT SERPL-MCNC: 1.38 MG/DL (ref 0.67–1.17)
DEPRECATED HCO3 PLAS-SCNC: 26 MMOL/L (ref 22–29)
ERYTHROCYTE [DISTWIDTH] IN BLOOD BY AUTOMATED COUNT: 15.4 % (ref 10–15)
GFR SERPL CREATININE-BSD FRML MDRD: 55 ML/MIN/1.73M2
GLUCOSE SERPL-MCNC: 109 MG/DL (ref 70–99)
GLUCOSE UR STRIP-MCNC: NEGATIVE MG/DL
GRAN CASTS #/AREA URNS LPF: ABNORMAL /LPF
HBA1C MFR BLD: 6.4 % (ref 0–5.6)
HCT VFR BLD AUTO: 47.1 % (ref 40–53)
HDLC SERPL-MCNC: 28 MG/DL
HGB BLD-MCNC: 15.1 G/DL (ref 13.3–17.7)
HGB UR QL STRIP: NEGATIVE
HYALINE CASTS #/AREA URNS LPF: ABNORMAL /LPF
KETONES UR STRIP-MCNC: ABNORMAL MG/DL
LDLC SERPL CALC-MCNC: 94 MG/DL
LEUKOCYTE ESTERASE UR QL STRIP: NEGATIVE
MCH RBC QN AUTO: 27.7 PG (ref 26.5–33)
MCHC RBC AUTO-ENTMCNC: 32.1 G/DL (ref 31.5–36.5)
MCV RBC AUTO: 86 FL (ref 78–100)
MUCOUS THREADS #/AREA URNS LPF: PRESENT /LPF
NITRATE UR QL: NEGATIVE
NONHDLC SERPL-MCNC: 118 MG/DL
PH UR STRIP: 5 [PH] (ref 5–8)
PLATELET # BLD AUTO: 205 10E3/UL (ref 150–450)
POTASSIUM SERPL-SCNC: 4.6 MMOL/L (ref 3.4–5.3)
PROT SERPL-MCNC: 6.6 G/DL (ref 6.4–8.3)
PSA SERPL-MCNC: 7.2 NG/ML (ref 0–6.5)
RBC # BLD AUTO: 5.45 10E6/UL (ref 4.4–5.9)
RBC #/AREA URNS AUTO: ABNORMAL /HPF
SODIUM SERPL-SCNC: 141 MMOL/L (ref 136–145)
SP GR UR STRIP: >=1.03 (ref 1–1.03)
TRIGL SERPL-MCNC: 119 MG/DL
TSH SERPL DL<=0.005 MIU/L-ACNC: 95.7 UIU/ML (ref 0.3–4.2)
UROBILINOGEN UR STRIP-ACNC: 1 E.U./DL
WBC # BLD AUTO: 10.2 10E3/UL (ref 4–11)
WBC #/AREA URNS AUTO: ABNORMAL /HPF

## 2022-12-08 PROCEDURE — 84439 ASSAY OF FREE THYROXINE: CPT | Performed by: INTERNAL MEDICINE

## 2022-12-08 PROCEDURE — 80053 COMPREHEN METABOLIC PANEL: CPT | Performed by: INTERNAL MEDICINE

## 2022-12-08 PROCEDURE — G0008 ADMIN INFLUENZA VIRUS VAC: HCPCS | Performed by: INTERNAL MEDICINE

## 2022-12-08 PROCEDURE — 36415 COLL VENOUS BLD VENIPUNCTURE: CPT | Performed by: INTERNAL MEDICINE

## 2022-12-08 PROCEDURE — 0134A COVID-19 VACCINE BIVALENT BOOSTER 18+ (MODERNA): CPT | Performed by: INTERNAL MEDICINE

## 2022-12-08 PROCEDURE — 99215 OFFICE O/P EST HI 40 MIN: CPT | Mod: 25 | Performed by: INTERNAL MEDICINE

## 2022-12-08 PROCEDURE — 81001 URINALYSIS AUTO W/SCOPE: CPT | Performed by: INTERNAL MEDICINE

## 2022-12-08 PROCEDURE — G0103 PSA SCREENING: HCPCS | Performed by: INTERNAL MEDICINE

## 2022-12-08 PROCEDURE — 85027 COMPLETE CBC AUTOMATED: CPT | Performed by: INTERNAL MEDICINE

## 2022-12-08 PROCEDURE — 90662 IIV NO PRSV INCREASED AG IM: CPT | Performed by: INTERNAL MEDICINE

## 2022-12-08 PROCEDURE — 80061 LIPID PANEL: CPT | Performed by: INTERNAL MEDICINE

## 2022-12-08 PROCEDURE — 83036 HEMOGLOBIN GLYCOSYLATED A1C: CPT | Performed by: INTERNAL MEDICINE

## 2022-12-08 PROCEDURE — 91313 COVID-19 VACCINE BIVALENT BOOSTER 18+ (MODERNA): CPT | Performed by: INTERNAL MEDICINE

## 2022-12-08 PROCEDURE — 84443 ASSAY THYROID STIM HORMONE: CPT | Performed by: INTERNAL MEDICINE

## 2022-12-08 RX ORDER — METOPROLOL SUCCINATE 50 MG/1
50 TABLET, EXTENDED RELEASE ORAL DAILY
Qty: 90 TABLET | Refills: 3 | Status: SHIPPED | OUTPATIENT
Start: 2022-12-08 | End: 2023-12-11

## 2022-12-08 RX ORDER — ATORVASTATIN CALCIUM 80 MG/1
80 TABLET, FILM COATED ORAL DAILY
Qty: 90 TABLET | Refills: 3 | Status: SHIPPED | OUTPATIENT
Start: 2022-12-08 | End: 2023-06-27 | Stop reason: ALTCHOICE

## 2022-12-08 RX ORDER — CLOPIDOGREL BISULFATE 75 MG/1
75 TABLET ORAL DAILY
Qty: 90 TABLET | Refills: 3 | Status: SHIPPED | OUTPATIENT
Start: 2022-12-08 | End: 2023-06-27 | Stop reason: ALTCHOICE

## 2022-12-08 RX ORDER — ASPIRIN 325 MG
325 TABLET ORAL DAILY
Qty: 90 TABLET | Refills: 3 | Status: SHIPPED | OUTPATIENT
Start: 2022-12-08 | End: 2023-09-08

## 2022-12-08 NOTE — PROGRESS NOTES
Office Visit - Follow Up   Loy Reid   72 year old male    Date of Visit: 12/8/2022    Chief Complaint   Patient presents with     Hypertension     Hyperlipidemia        -------------------------------------------------------------------------------------------------------------------------  Assessment and Plan    Follow-up visit multiple issues, today is actually the first time that I have met Loy face-to-face, because we had a telephone visit to establish care December 2020, which was 2 years ago  Does not come to doctors very often.  He did have a clinic visit in January 2021 because of shingles.    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  He still smokes a few cigarettes a day.  I renewed his prescriptions which seem appropriate, metoprolol, atorvastatin, clopidogrel, aspirin.    He is fasting this morning, so I ordered for him a set of blood test to establish new baselines.  We will check his lipid panel, A1c test for diabetes, comprehensive metabolic panel, blood cell counts, TSH thyroid test, screening PSA (PSA was elevated when last checked in 2019). UA    We will give Loy a seasonal flu shot, also bivalent COVID-19 booster.    We will see him back in about 3 months, at which time give him his Prevnar 20 pneumococcal booster    ADDENDUM (issue added December 9, 2022)  Hypothyroidism, with very high TSH in the 90s, and low free T4, clinically seems euthyroid, but hypothyroidism could contribute to atherosclerosis, low HDL, overweight, elevated blood pressure  I am sending a SETiT message telling him that I am starting him on levothyroxine 50 mcg/day, but that is just a starting dose, almost surely he will need more.  Check TSH in 2 months and adjust accordingly.    ADDENDUM (issue added December 9, 2022)  Elevated PSA 7.2, up from previous reading, also 2-5 red cells seen on urinalysis, and a man who smokes.  Consultation sent to urology.  Informed him via  MyChart message      History heavy smoking history, COPD  Will undergo screening lung CT scan  Quit smoking Autumn 2019, relapsed  Have a chronic cough.  51 yrs, about 1 ppd    Important thing discussed today's lung cancer screening because of his long smoking history.  He is interested in getting screening lung CT scan, and we conducted shared decision-making.  He realizes that often there are findings on the initial scan, which may require follow-up imaging.  If there is something concerning on the scan, that could lead to invasive diagnostic procedures.    COPD, Chronic bronchitis  He confirmed for me December 2022 that he still experiences Dyspnea on exertion walking half a block. Gets respiratory infection about 1 a year    Pneumo Conj 13-V (2010&after) 05/06/2015   Pneumococcal 23 valent 01/01/2010     History of shingles that affected his right forehead and scalp, near the eye, but did not have ocular involvement when [facility], [facility] 2021   I told Loy that shingles vaccination could be considered, which she would get at a community pharmacy, since it is paid under the Medicare prescription drug benefit.    Mixed hyperlipidemia  No angina claudication or TIAs.  He does take Lipitor 80 mg.  atorvastatin (LIPITOR) 80 MG tablet  11-  Triglycerides <=149 mg/dL 121      Direct Measure HDL >=40 mg/dL 34 Low      LDL Cholesterol Calculated <=129 mg/dL 91      History of coronary artery stents placement, 2010  aspirin 325 MG tablet  Clopidogrel 75 mg once a day     Essential hypertension  metoprolol succinate (TOPROL-XL) 50 MG 24 hr tablet    Systolic blood pressure bit elevated this morning of December 8, 2022.  I told Loy to start using his home blood pressure machine that goes on the right upper arm, record readings in a notebook, and bring the machine to his next clinic visit, so that we can validate it against a manual reading done on his right arm, to make sure his machine  is accurate, then he can leave the numbers.    Longer-term, he is to work on losing weight and eating healthy, which will help his blood pressure and reduce dependence on medication.    If we need additional medication for blood pressure, I probably put him on losartan.    Target blood pressure is under 130 systolic when measured at rest in the seated position on the right upper arm.  BP Readings from Last 6 Encounters:   12/08/22 (!) 162/78   11/26/19 138/76     Obesity body mass index 31.  Would like to see him lose about 15 pounds in the first 2023  He told with alcohol consumption is minimal.  He might eat in a restaurant once a month.  I reminded him about the importance of eating slowly, controlling portion size, and identifying problem foods to curtail or limit such starches, sweets, and fried foods.   Wt Readings from Last 5 Encounters:   12/08/22 92.2 kg (203 lb 4.8 oz)   11/26/19 89.8 kg (198 lb)   03/28/19 85.3 kg (188 lb)   02/05/18 88 kg (194 lb)   10/18/17 87.1 kg (192 lb)     Elevated PSA  Loy reports some urinary urgency, may be some slow flow, his symptoms sound like benign prostatic hyperplasia.  We will also check a urinalysis.  11-  Prostate Specific Antigen Screen 0.0 - 4.5 ng/mL 6.9 High      Special screening for malignant neoplasms, colon  Cologuard 12-4-2019  I have ordered for him an updated Cologuard test, and he knows that if the Cologuard turns positive, then he would need to get a diagnostic colonoscopy      Immunization History   Administered Date(s) Administered     COVID-19 Vaccine 18+ (Moderna) 02/14/2021, 03/14/2021, 10/22/2021, 04/20/2022     FLU 6-35 months 10/27/2015     FLUAD(HD)65+ QUAD 11/19/2020     Flu, Unspecified 10/28/2015     Influenza (High Dose) 3 valent vaccine 09/20/2017, 11/26/2019     Influenza (IIV3) PF 01/10/2013     Pneumo Conj 13-V (2010&after) 05/06/2015     Pneumococcal 23 valent 01/01/2010     Tdap (Adacel,Boostrix) 06/29/2015     Zoster vaccine,  live 01/10/2013     --------------------------------------------------------------------------------------------------------------------------  History of Present Illness  This 72 year old old       Hyperlipidemia Follow-Up    Are you regularly taking any medication or supplement to lower your cholesterol?   Yes- Atorvastatin 80 mg    Are you having muscle aches or other side effects that you think could be caused by your cholesterol lowering medication?  No    Hypertension Follow-up    Do you check your blood pressure regularly outside of the clinic? No     Are you following a low salt diet? Yes    Are your blood pressures ever more than 140 on the top number (systolic) OR more   than 90 on the bottom number (diastolic), for example 140/90? Unknown    Follow-up visit multiple issues, today is actually the first time that I have met Loy face-to-face, because we had a telephone visit to establish care December 2020, which was 2 years ago  Does not come to doctors very often.  He did have a clinic visit in January 2021 because of shingles.    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  He still smokes a few cigarettes a day.  I renewed his prescriptions which seem appropriate, metoprolol, atorvastatin, clopidogrel, aspirin.    He is fasting this morning, so I ordered for him a set of blood test to establish new baselines.  We will check his lipid panel, A1c test for diabetes, comprehensive metabolic panel, blood cell counts, TSH thyroid test, screening PSA (PSA was elevated when last checked in 2019). UA    We will give Loy a seasonal flu shot, also bivalent COVID-19 booster.    We will see him back in about 3 months, at which time give him his Prevnar 20 pneumococcal booster    History heavy smoking history, COPD  Will undergo screening lung CT scan  Quit smoking Autumn 2019, relapsed  Have a chronic cough.  51 yrs, about 1 ppd    Important thing discussed today's lung  cancer screening because of his long smoking history.  He is interested in getting screening lung CT scan, and we conducted shared decision-making.  He realizes that often there are findings on the initial scan, which may require follow-up imaging.  If there is something concerning on the scan, that could lead to invasive diagnostic procedures.      Wt Readings from Last 3 Encounters:   12/08/22 92.2 kg (203 lb 4.8 oz)   11/26/19 89.8 kg (198 lb)   03/28/19 85.3 kg (188 lb)     BP Readings from Last 3 Encounters:   12/08/22 (!) 162/78   11/26/19 138/76         Review of Systems: A comprehensive review of systems was negative except as noted.  ---------------------------------------------------------------------------------------------------------------------------    Medications, Allergies, Social, and Problem List   Current Outpatient Medications   Medication Sig Dispense Refill     aspirin (ASA) 325 MG tablet Take 1 tablet (325 mg) by mouth daily 90 tablet 3     atorvastatin (LIPITOR) 80 MG tablet Take 1 tablet (80 mg) by mouth daily 90 tablet 3     clopidogrel (PLAVIX) 75 MG tablet Take 1 tablet (75 mg) by mouth daily 90 tablet 3     metoprolol succinate ER (TOPROL XL) 50 MG 24 hr tablet Take 1 tablet (50 mg) by mouth daily 90 tablet 3     nitroglycerin (NITROSTAT) 0.3 MG SL tablet [NITROGLYCERIN (NITROSTAT) 0.3 MG SL TABLET] Place 1 tablet (0.3 mg total) under the tongue every 5 (five) minutes as needed for chest pain. (Patient not taking: Reported on 12/8/2022) 20 tablet 3     No Known Allergies  Social History     Tobacco Use     Smoking status: Some Days     Types: Cigarettes     Start date: 05/2022     Smokeless tobacco: Never     Tobacco comments:     Has 1 cigarette every so often     Patient Active Problem List   Diagnosis     Coronary artery disease involving native coronary artery     History of coronary artery stent placement     Mixed hyperlipidemia     Essential hypertension     Smoker     COPD  (chronic obstructive pulmonary disease) (H)     Class 1 obesity due to excess calories without serious comorbidity with body mass index (BMI) of 31.0 to 31.9 in adult        Reviewed, reconciled and updated       Physical Exam   General Appearance:       BP (!) 162/78 (BP Location: Right arm, Patient Position: Sitting, Cuff Size: Adult Large)   Pulse 55   Temp 97.7  F (36.5  C) (Oral)   Resp 20   Wt 92.2 kg (203 lb 4.8 oz)   SpO2 97%   BMI 31.37 kg/m      Appears generally well, he is overweight with body mass index 31, no elevated systolic blood pressure  Oropharynx clear  No cervical adenopathy  Lungs clear  Heart regular rate and rhythm no murmur  Abdomen nontender but obese  Extremities no edema     Additional Information   I spent 40 minutes on this encounter, including reviewing interval history since last visit, examining the patient, explaining and counseling the issues enumerated in the Assessment and Plan (patient given a copy), ordering indicated tests, ordering prescriptions     CUATE KING MD, MD

## 2022-12-08 NOTE — PATIENT INSTRUCTIONS
Follow-up visit multiple issues, today is actually the first time that I have met Loy face-to-face, because we had a telephone visit to establish care December 2020, which was 2 years ago  Does not come to doctors very often.  He did have a clinic visit in January 2021 because of shingles.    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  He still smokes a few cigarettes a day.  I renewed his prescriptions which seem appropriate, metoprolol, atorvastatin, clopidogrel, aspirin.    He is fasting this morning, so I ordered for him a set of blood test to establish new baselines.  We will check his lipid panel, A1c test for diabetes, comprehensive metabolic panel, blood cell counts, TSH thyroid test, screening PSA (PSA was elevated when last checked in 2019). UA    We will give Loy a seasonal flu shot, also bivalent COVID-19 booster.    We will see him back in about 3 months, at which time give him his Prevnar 20 pneumococcal booster    History heavy smoking history, COPD  Will undergo screening lung CT scan  Quit smoking Autumn 2019, relapsed  Have a chronic cough.  50 yrs, about 1 ppd    Important thing discussed today's lung cancer screening because of his long smoking history.  He is interested in getting screening lung CT scan, and we conducted shared decision-making.  He realizes that often there are findings on the initial scan, which may require follow-up imaging.  If there is something concerning on the scan, that could lead to invasive diagnostic procedures.    COPD, Chronic bronchitis  He confirmed for me December 2022 that he still experiences Dyspnea on exertion walking half a block. Gets respiratory infection about 1 a year    Pneumo Conj 13-V (2010&after) 05/06/2015   Pneumococcal 23 valent 01/01/2010     History of shingles that affected his right forehead and scalp, near the eye, but did not have ocular involvement when [facility], January  2021   I told Loy that shingles vaccination could be considered, which she would get at a community pharmacy, since it is paid under the Medicare prescription drug benefit.    Mixed hyperlipidemia  No angina claudication or TIAs.  He does take Lipitor 80 mg.  atorvastatin (LIPITOR) 80 MG tablet  11-  Triglycerides <=149 mg/dL 121      Direct Measure HDL >=40 mg/dL 34 Low      LDL Cholesterol Calculated <=129 mg/dL 91      History of coronary artery stents placement, 2010  aspirin 325 MG tablet  Clopidogrel 75 mg once a day     Essential hypertension  metoprolol succinate (TOPROL-XL) 50 MG 24 hr tablet    Systolic blood pressure bit elevated this morning of December 8, 2022.  I told Loy to start using his home blood pressure machine that goes on the right upper arm, record readings in a notebook, and bring the machine to his next clinic visit, so that we can validate it against a manual reading done on his right arm, to make sure his machine is accurate, then he can leave the numbers.    Longer-term, he is to work on losing weight and eating healthy, which will help his blood pressure and reduce dependence on medication.    If we need additional medication for blood pressure, I probably put him on losartan.    Target blood pressure is under 130 systolic when measured at rest in the seated position on the right upper arm.  BP Readings from Last 6 Encounters:   12/08/22 (!) 162/78   11/26/19 138/76     Obesity body mass index 31.  Would like to see him lose about 15 pounds in the first 2023  He told with alcohol consumption is minimal.  He might eat in a restaurant once a month.  I reminded him about the importance of eating slowly, controlling portion size, and identifying problem foods to curtail or limit such starches, sweets, and fried foods.   Wt Readings from Last 5 Encounters:   12/08/22 92.2 kg (203 lb 4.8 oz)   11/26/19 89.8 kg (198 lb)   03/28/19 85.3 kg (188 lb)   02/05/18 88 kg (194 lb)    10/18/17 87.1 kg (192 lb)     Elevated PSA  Loy reports some urinary urgency, may be some slow flow, his symptoms sound like benign prostatic hyperplasia.  We will also check a urinalysis.  11-  Prostate Specific Antigen Screen 0.0 - 4.5 ng/mL 6.9 High      Special screening for malignant neoplasms, colon  Cologuard 12-4-2019  I have ordered for him an updated Cologuard test, and he knows that if the Cologuard turns positive, then he would need to get a diagnostic colonoscopy      Immunization History   Administered Date(s) Administered    COVID-19 Vaccine 18+ (Moderna) 02/14/2021, 03/14/2021, 10/22/2021, 04/20/2022    FLU 6-35 months 10/27/2015    FLUAD(HD)65+ QUAD 11/19/2020    Flu, Unspecified 10/28/2015    Influenza (High Dose) 3 valent vaccine 09/20/2017, 11/26/2019    Influenza (IIV3) PF 01/10/2013    Pneumo Conj 13-V (2010&after) 05/06/2015    Pneumococcal 23 valent 01/01/2010    Tdap (Adacel,Boostrix) 06/29/2015    Zoster vaccine, live 01/10/2013

## 2022-12-09 PROBLEM — E03.8 OTHER SPECIFIED HYPOTHYROIDISM: Status: ACTIVE | Noted: 2022-12-09

## 2022-12-09 LAB — T4 FREE SERPL-MCNC: 0.54 NG/DL (ref 0.9–1.7)

## 2022-12-09 RX ORDER — LEVOTHYROXINE SODIUM 50 UG/1
50 TABLET ORAL DAILY
Qty: 90 TABLET | Refills: 1 | Status: SHIPPED | OUTPATIENT
Start: 2022-12-09 | End: 2023-02-03 | Stop reason: DRUGHIGH

## 2022-12-23 LAB — NONINV COLON CA DNA+OCC BLD SCRN STL QL: NEGATIVE

## 2023-01-06 ENCOUNTER — HOSPITAL ENCOUNTER (OUTPATIENT)
Dept: CT IMAGING | Facility: CLINIC | Age: 73
Discharge: HOME | End: 2023-01-06
Attending: INTERNAL MEDICINE | Admitting: INTERNAL MEDICINE
Payer: MEDICARE

## 2023-01-06 DIAGNOSIS — Z87.891 PERSONAL HISTORY OF NICOTINE DEPENDENCE: ICD-10-CM

## 2023-01-06 DIAGNOSIS — Z72.0 TOBACCO ABUSE: ICD-10-CM

## 2023-01-06 PROCEDURE — 71271 CT THORAX LUNG CANCER SCR C-: CPT

## 2023-01-31 ENCOUNTER — OFFICE VISIT (OUTPATIENT)
Dept: ONCOLOGY | Facility: CLINIC | Age: 73
End: 2023-01-31
Attending: STUDENT IN AN ORGANIZED HEALTH CARE EDUCATION/TRAINING PROGRAM
Payer: MEDICARE

## 2023-01-31 VITALS
DIASTOLIC BLOOD PRESSURE: 81 MMHG | BODY MASS INDEX: 29.36 KG/M2 | OXYGEN SATURATION: 96 % | SYSTOLIC BLOOD PRESSURE: 179 MMHG | HEIGHT: 68 IN | WEIGHT: 193.7 LBS | HEART RATE: 60 BPM | RESPIRATION RATE: 18 BRPM

## 2023-01-31 DIAGNOSIS — Z72.0 TOBACCO ABUSE: ICD-10-CM

## 2023-01-31 DIAGNOSIS — R31.29 MICROHEMATURIA: ICD-10-CM

## 2023-01-31 DIAGNOSIS — R97.20 ELEVATED PROSTATE SPECIFIC ANTIGEN (PSA): Primary | ICD-10-CM

## 2023-01-31 PROCEDURE — G0463 HOSPITAL OUTPT CLINIC VISIT: HCPCS | Performed by: STUDENT IN AN ORGANIZED HEALTH CARE EDUCATION/TRAINING PROGRAM

## 2023-01-31 PROCEDURE — 99204 OFFICE O/P NEW MOD 45 MIN: CPT | Performed by: STUDENT IN AN ORGANIZED HEALTH CARE EDUCATION/TRAINING PROGRAM

## 2023-01-31 PROCEDURE — 88112 CYTOPATH CELL ENHANCE TECH: CPT | Mod: TC | Performed by: STUDENT IN AN ORGANIZED HEALTH CARE EDUCATION/TRAINING PROGRAM

## 2023-01-31 ASSESSMENT — PAIN SCALES - GENERAL: PAINLEVEL: NO PAIN (0)

## 2023-01-31 NOTE — LETTER
"    1/31/2023         RE: Loy Reid  [address]  [place] [address]        Dear Colleague,    Thank you for referring your patient, Loy Reid, to the [facility]. Please see a copy of my visit note below.    .  Urology Rooming Note    January 31, 2023 9:28 AM   Loy Reid is a 72 year old male who presents for:    Chief Complaint   Patient presents with     Urology     Elevated PSA     Initial Vitals: BP (!) 179/81   Pulse 60   Resp 18   Ht 1.715 m (5' 7.5\")   Wt 87.9 kg (193 lb 11.2 oz)   SpO2 96%   BMI 29.89 kg/m   Estimated body mass index is 29.89 kg/m  as calculated from the following:    Height as of this encounter: 1.715 m (5' 7.5\").    Weight as of this encounter: 87.9 kg (193 lb 11.2 oz). Body surface area is 2.05 meters squared.  No Pain (0) Comment: Data Unavailable     Allergies reviewed: Yes  Medications reviewed: Yes    Medications: Medication refills not needed today.  Pharmacy name entered into [facility]: [id number] - [place], [place] - [address] AT [facility] [address]      Kaleigh Moralez LPN          Chief Complaint:    Elevated PSA (Prostate Specific Antigen) and microscopic hematuria           Consult or Referral:     Mr. Loy Reid is a 72 year old male seen at the request of Dr. Barahona.         History of Present Illness:     Loy Reid is a 72 year old male being seen for elevated PSA and microscopic hematuria.  Duration of problem: Few weeks/months, PSA has been elevated for the last 3 years and 2 separate assessments  Previous treatments: None yet      Reviewed previous notes from Dr. Jun Granado presents today for evaluation of microscopic hematuria and elevated PSA  Does not have any symptomatic lower urine tract symptoms  No gross hematuria  Longtime smoker still smokes occasionally  He is very averse to the idea of invasive testing including rectal examination as well as " cystoscopy             Past Medical History:     Past Medical History:   Diagnosis Date     Myocardial infarction (H)      PVD (peripheral vascular disease) (H)      Tobacco use             Past Surgical History:     Past Surgical History:   Procedure Laterality Date     FOOT FRACTURE SURGERY              Medications     Current Outpatient Medications   Medication     aspirin (ASA) 325 MG tablet     atorvastatin (LIPITOR) 80 MG tablet     clopidogrel (PLAVIX) 75 MG tablet     levothyroxine (SYNTHROID/LEVOTHROID) 50 MCG tablet     metoprolol succinate ER (TOPROL XL) 50 MG 24 hr tablet     nitroglycerin (NITROSTAT) 0.3 MG SL tablet     No current facility-administered medications for this visit.            Family History:     Family History   Problem Relation Age of Onset     Cerebrovascular Disease Mother      Coronary Artery Disease Mother      Cerebrovascular Disease Father      Heart Disease Father      Coronary Artery Disease Sister      Heart Disease Brother             Social History:     Social History     Socioeconomic History     Marital status:      Spouse name: Not on file     Number of children: Not on file     Years of education: Not on file     Highest education level: Not on file   Occupational History     Not on file   Tobacco Use     Smoking status: Some Days     Types: Cigarettes     Start date: 05/2022     Smokeless tobacco: Never     Tobacco comments:     Has 1 cigarette every so often   Substance and Sexual Activity     Alcohol use: Not on file     Drug use: Not on file     Sexual activity: Not on file   Other Topics Concern     Not on file   Social History Narrative     Not on file     Social Determinants of Health     Financial Resource Strain: Not on file   Food Insecurity: Not on file   Transportation Needs: Not on file   Physical Activity: Not on file   Stress: Not on file   Social Connections: Not on file   Intimate Partner Violence: Not on file   Housing Stability: Not on file  "           Allergies:   Patient has no known allergies.         Review of Systems:  From intake questionnaire     Skin: negative  Eyes: negative  Ears/Nose/Throat: negative  Respiratory: No shortness of breath, dyspnea on exertion, cough, or hemoptysis  Cardiovascular: No chest pain or palpitations  Gastrointestinal: negative; no nausea/vomiting, constipation or diarrhea  Genitourinary: as per HPI  Musculoskeletal: negative  Neurologic: negative  Psychiatric: negative  Hematologic/Lymphatic/Immunologic: negative  Endocrine: negative         Physical Exam:     Patient is a 72 year old  male   Vitals: Blood pressure (!) 179/81, pulse 60, resp. rate 18, height 1.715 m (5' 7.5\"), weight 87.9 kg (193 lb 11.2 oz), SpO2 96 %.  Constitutional: Body mass index is 29.89 kg/m .  Alert, no acute distress, oriented, conversant  Eyes: no scleral icterus; extraocular muscles intact, moist conjunctivae  Neck: trachea midline, no thyromegaly  Ears/nose/mouth: throat/mouth:normal, good dentition  Respiratory: no respiratory distress, or pursed lip breathing  Cardiovascular: pulses strong and intact; no obvious jugular venous distension present  Gastrointestinal: soft, nontender, no organomegaly or masses,   Lymphatics: No inguinal adenopathy  Musculoskeletal: extremities normal, no peripheral edema  Skin: no suspicious lesions or rashes  Neuro: Alert, oriented, speech and mentation normal  Psych: affect and mood normal, alert and oriented to person, place and time  Gait: Normal  : Refused by patient      Labs and Pathology:    The following labs were reviewed by me and discussed with the patient:  UA: Abnormal: 2-5 RBCs seen  Significant for   Lab Results   Component Value Date    CR 1.38 12/08/2022    CR 1.26 11/26/2019     Prostate Specific Antigen Screen   Date Value Ref Range Status   12/08/2022 7.20 (H) 0.00 - 6.50 ng/mL Final   11/26/2019 6.9 (H) 0.0 - 4.5 ng/mL Final             Imaging:    The following imaging exams were " independently viewed and interpreted by me and discussed with patient:                 Assessment and Plan:       Microhematuria  Discussed about the significance of microscopic hematuria and likelihood of urothelial malignancies considering his age and history of smoking  Discussed about need for cystoscopy as well as further evaluation with upper tract imaging  He is scheduled for an MRI prostate in the future and will probably plan a cystoscopy in accordance with diet in the future  He is not very open to the idea of a cystoscopy but I did tell him that that may be essential considering his high risk for having any possible bladder neoplasms  He does not have any symptoms he feels that he may not need further evaluation if not necessary    - Adult Urology  Referral  - Cytology, non-gynecologic; Future  - Cytology, non-gynecologic    Elevated prostate specific antigen (PSA)  PSA has been consistently elevated for the past 3 years  He refused rectal exam today  I encouraged him to get an MRI of the prostate and then will have a follow-up visit to discuss these results    - Adult Urology  Referral  - MR Prostate wo & w Contrast; Future      Plan:  MRI prostate  Plan for cystoscopy and possibly upper tract imaging  Urine cytology and FISH to be sent today    Orders  Orders Placed This Encounter   Procedures     MR Prostate wo & w Contrast       Burak Lugo MD  [facility]      ==========================    Additional Billing and Coding Information:  Review of external notes as documented above   Review of the result(s) of each unique test - UA, creatinine, PSA    Independent interpretation of a test performed by another physician/other qualified health care professional (not separately reported) -       Discussion of management or test interpretation with external physician/other qualified healthcare professional/appropriate source -           20 minutes spent on  the date of the encounter doing chart review, review of test results, interpretation of tests, patient visit and documentation     ==========================      Again, thank you for allowing me to participate in the care of your patient.        Sincerely,        Burak Lugo MD

## 2023-01-31 NOTE — PROGRESS NOTES
".  Urology Rooming Note    January 31, 2023 9:28 AM   Loy Reid is a 72 year old male who presents for:    Chief Complaint   Patient presents with     Urology     Elevated PSA     Initial Vitals: BP (!) 179/81   Pulse 60   Resp 18   Ht 1.715 m (5' 7.5\")   Wt 87.9 kg (193 lb 11.2 oz)   SpO2 96%   BMI 29.89 kg/m   Estimated body mass index is 29.89 kg/m  as calculated from the following:    Height as of this encounter: 1.715 m (5' 7.5\").    Weight as of this encounter: 87.9 kg (193 lb 11.2 oz). Body surface area is 2.05 meters squared.  No Pain (0) Comment: Data Unavailable     Allergies reviewed: Yes  Medications reviewed: Yes    Medications: Medication refills not needed today.  Pharmacy name entered into [facility]: [id number] [address] AT [facility] [address]HAEL Moralez LPN  "

## 2023-01-31 NOTE — PROGRESS NOTES
Chief Complaint:    Elevated PSA (Prostate Specific Antigen) and microscopic hematuria           Consult or Referral:     Mr. Loy Reid is a 72 year old male seen at the request of Dr. Barahona.         History of Present Illness:     Loy Reid is a 72 year old male being seen for elevated PSA and microscopic hematuria.  Duration of problem: Few weeks/months, PSA has been elevated for the last 3 years and 2 separate assessments  Previous treatments: None yet      Reviewed previous notes from Dr. Jun Granado presents today for evaluation of microscopic hematuria and elevated PSA  Does not have any symptomatic lower urine tract symptoms  No gross hematuria  Longtime smoker still smokes occasionally  He is very averse to the idea of invasive testing including rectal examination as well as cystoscopy             Past Medical History:     Past Medical History:   Diagnosis Date     Myocardial infarction (H)      PVD (peripheral vascular disease) (H)      Tobacco use             Past Surgical History:     Past Surgical History:   Procedure Laterality Date     FOOT FRACTURE SURGERY              Medications     Current Outpatient Medications   Medication     aspirin (ASA) 325 MG tablet     atorvastatin (LIPITOR) 80 MG tablet     clopidogrel (PLAVIX) 75 MG tablet     levothyroxine (SYNTHROID/LEVOTHROID) 50 MCG tablet     metoprolol succinate ER (TOPROL XL) 50 MG 24 hr tablet     nitroglycerin (NITROSTAT) 0.3 MG SL tablet     No current facility-administered medications for this visit.            Family History:     Family History   Problem Relation Age of Onset     Cerebrovascular Disease Mother      Coronary Artery Disease Mother      Cerebrovascular Disease Father      Heart Disease Father      Coronary Artery Disease Sister      Heart Disease Brother             Social History:     Social History     Socioeconomic History     Marital status:      Spouse name: Not on file     Number of children: Not on file  "    Years of education: Not on file     Highest education level: Not on file   Occupational History     Not on file   Tobacco Use     Smoking status: Some Days     Types: Cigarettes     Start date: 05/2022     Smokeless tobacco: Never     Tobacco comments:     Has 1 cigarette every so often   Substance and Sexual Activity     Alcohol use: Not on file     Drug use: Not on file     Sexual activity: Not on file   Other Topics Concern     Not on file   Social History Narrative     Not on file     Social Determinants of Health     Financial Resource Strain: Not on file   Food Insecurity: Not on file   Transportation Needs: Not on file   Physical Activity: Not on file   Stress: Not on file   Social Connections: Not on file   Intimate Partner Violence: Not on file   Housing Stability: Not on file            Allergies:   Patient has no known allergies.         Review of Systems:  From intake questionnaire     Skin: negative  Eyes: negative  Ears/Nose/Throat: negative  Respiratory: No shortness of breath, dyspnea on exertion, cough, or hemoptysis  Cardiovascular: No chest pain or palpitations  Gastrointestinal: negative; no nausea/vomiting, constipation or diarrhea  Genitourinary: as per HPI  Musculoskeletal: negative  Neurologic: negative  Psychiatric: negative  Hematologic/Lymphatic/Immunologic: negative  Endocrine: negative         Physical Exam:     Patient is a 72 year old  male   Vitals: Blood pressure (!) 179/81, pulse 60, resp. rate 18, height 1.715 m (5' 7.5\"), weight 87.9 kg (193 lb 11.2 oz), SpO2 96 %.  Constitutional: Body mass index is 29.89 kg/m .  Alert, no acute distress, oriented, conversant  Eyes: no scleral icterus; extraocular muscles intact, moist conjunctivae  Neck: trachea midline, no thyromegaly  Ears/nose/mouth: throat/mouth:normal, good dentition  Respiratory: no respiratory distress, or pursed lip breathing  Cardiovascular: pulses strong and intact; no obvious jugular venous distension " present  Gastrointestinal: soft, nontender, no organomegaly or masses,   Lymphatics: No inguinal adenopathy  Musculoskeletal: extremities normal, no peripheral edema  Skin: no suspicious lesions or rashes  Neuro: Alert, oriented, speech and mentation normal  Psych: affect and mood normal, alert and oriented to person, place and time  Gait: Normal  : Refused by patient      Labs and Pathology:    The following labs were reviewed by me and discussed with the patient:  UA: Abnormal: 2-5 RBCs seen  Significant for   Lab Results   Component Value Date    CR 1.38 12/08/2022    CR 1.26 11/26/2019     Prostate Specific Antigen Screen   Date Value Ref Range Status   12/08/2022 7.20 (H) 0.00 - 6.50 ng/mL Final   11/26/2019 6.9 (H) 0.0 - 4.5 ng/mL Final             Imaging:    The following imaging exams were independently viewed and interpreted by me and discussed with patient:                 Assessment and Plan:       Microhematuria  Discussed about the significance of microscopic hematuria and likelihood of urothelial malignancies considering his age and history of smoking  Discussed about need for cystoscopy as well as further evaluation with upper tract imaging  He is scheduled for an MRI prostate in the future and will probably plan a cystoscopy in accordance with diet in the future  He is not very open to the idea of a cystoscopy but I did tell him that that may be essential considering his high risk for having any possible bladder neoplasms  He does not have any symptoms he feels that he may not need further evaluation if not necessary    - Adult Urology  Referral  - Cytology, non-gynecologic; Future  - Cytology, non-gynecologic    Elevated prostate specific antigen (PSA)  PSA has been consistently elevated for the past 3 years  He refused rectal exam today  I encouraged him to get an MRI of the prostate and then will have a follow-up visit to discuss these results    - Adult Urology  Referral  -  MR Prostate wo & w Contrast; Future      Plan:  MRI prostate  Plan for cystoscopy and possibly upper tract imaging  Urine cytology and FISH to be sent today    Orders  Orders Placed This Encounter   Procedures     MR Prostate wo & w Contrast       Burak Lugo MD  [facility]      ==========================    Additional Billing and Coding Information:  Review of external notes as documented above   Review of the result(s) of each unique test - UA, creatinine, PSA    Independent interpretation of a test performed by another physician/other qualified health care professional (not separately reported) -       Discussion of management or test interpretation with external physician/other qualified healthcare professional/appropriate source -           20 minutes spent on the date of the encounter doing chart review, review of test results, interpretation of tests, patient visit and documentation     ==========================

## 2023-01-31 NOTE — PATIENT INSTRUCTIONS
Urine Cytology to be collected  MRI prostate to be ordered  Please set up a follow-up appointment to discuss results

## 2023-02-02 ENCOUNTER — PATIENT OUTREACH (OUTPATIENT)
Dept: ONCOLOGY | Facility: CLINIC | Age: 73
End: 2023-02-02
Payer: MEDICARE

## 2023-02-02 LAB
PATH REPORT.COMMENTS IMP SPEC: NORMAL
PATH REPORT.COMMENTS IMP SPEC: NORMAL
PATH REPORT.FINAL DX SPEC: NORMAL
PATH REPORT.GROSS SPEC: NORMAL
PATH REPORT.MICROSCOPIC SPEC OTHER STN: NORMAL
PATH REPORT.RELEVANT HX SPEC: NORMAL

## 2023-02-02 PROCEDURE — 88112 CYTOPATH CELL ENHANCE TECH: CPT | Mod: 26 | Performed by: PATHOLOGY

## 2023-02-02 NOTE — PROGRESS NOTES
Called Loy to follow up on scheduling of his MRI and future follow up with Dr. Lugo. He has  The scheduling number to our [facility] scheduling team but had hesitated calling as he did not know why the MRI needed to be at the [facility]. Rationale explained for why the imaging needed to be completed at that site and he stated understanding. He will talk with his wife when she returns later this morning and he will call the schedulers to help arrange his MRI and his future follow up with Dr. Lugo. Confirmed that he had the correct number/Lona Fry RN

## 2023-02-03 ENCOUNTER — LAB (OUTPATIENT)
Dept: LAB | Facility: CLINIC | Age: 73
End: 2023-02-03
Attending: INTERNAL MEDICINE
Payer: MEDICARE

## 2023-02-03 DIAGNOSIS — E03.8 OTHER SPECIFIED HYPOTHYROIDISM: Primary | ICD-10-CM

## 2023-02-03 DIAGNOSIS — E03.9 HYPOTHYROIDISM, UNSPECIFIED TYPE: ICD-10-CM

## 2023-02-03 LAB
T4 FREE SERPL-MCNC: 1.06 NG/DL (ref 0.9–1.7)
TSH SERPL DL<=0.005 MIU/L-ACNC: 35.2 UIU/ML (ref 0.3–4.2)

## 2023-02-03 PROCEDURE — 84443 ASSAY THYROID STIM HORMONE: CPT

## 2023-02-03 PROCEDURE — 36415 COLL VENOUS BLD VENIPUNCTURE: CPT

## 2023-02-03 PROCEDURE — 84439 ASSAY OF FREE THYROXINE: CPT

## 2023-02-03 RX ORDER — LEVOTHYROXINE SODIUM 75 UG/1
75 TABLET ORAL DAILY
Qty: 90 TABLET | Refills: 3 | Status: SHIPPED | OUTPATIENT
Start: 2023-02-03 | End: 2023-06-09 | Stop reason: DRUGHIGH

## 2023-02-06 LAB
PATH REPORT.COMMENTS IMP SPEC: NORMAL
PATH REPORT.FINAL DX SPEC: NORMAL
PATH REPORT.GROSS SPEC: NORMAL
PATH REPORT.MICROSCOPIC SPEC OTHER STN: NORMAL
PATH REPORT.RELEVANT HX SPEC: NORMAL

## 2023-03-01 ENCOUNTER — HOSPITAL ENCOUNTER (OUTPATIENT)
Dept: MRI IMAGING | Facility: CLINIC | Age: 73
Discharge: HOME | End: 2023-03-01
Attending: STUDENT IN AN ORGANIZED HEALTH CARE EDUCATION/TRAINING PROGRAM | Admitting: STUDENT IN AN ORGANIZED HEALTH CARE EDUCATION/TRAINING PROGRAM
Payer: MEDICARE

## 2023-03-01 DIAGNOSIS — R97.20 ELEVATED PROSTATE SPECIFIC ANTIGEN (PSA): ICD-10-CM

## 2023-03-01 PROCEDURE — 72197 MRI PELVIS W/O & W/DYE: CPT | Mod: 26 | Performed by: RADIOLOGY

## 2023-03-01 PROCEDURE — G1010 CDSM STANSON: HCPCS

## 2023-03-01 PROCEDURE — G1010 CDSM STANSON: HCPCS | Performed by: RADIOLOGY

## 2023-03-01 PROCEDURE — 255N000002 HC RX 255 OP 636: Performed by: STUDENT IN AN ORGANIZED HEALTH CARE EDUCATION/TRAINING PROGRAM

## 2023-03-01 PROCEDURE — A9585 GADOBUTROL INJECTION: HCPCS | Performed by: STUDENT IN AN ORGANIZED HEALTH CARE EDUCATION/TRAINING PROGRAM

## 2023-03-01 RX ORDER — GADOBUTROL 604.72 MG/ML
7.5 INJECTION INTRAVENOUS ONCE
Status: COMPLETED | OUTPATIENT
Start: 2023-03-01 | End: 2023-03-01

## 2023-03-01 RX ADMIN — GADOBUTROL 7.5 ML: 604.72 INJECTION INTRAVENOUS at 16:38

## 2023-03-06 ENCOUNTER — HOSPITAL ENCOUNTER (OUTPATIENT)
Dept: MRI IMAGING | Facility: CLINIC | Age: 73
Discharge: HOME | End: 2023-03-06
Attending: STUDENT IN AN ORGANIZED HEALTH CARE EDUCATION/TRAINING PROGRAM | Admitting: STUDENT IN AN ORGANIZED HEALTH CARE EDUCATION/TRAINING PROGRAM
Payer: MEDICARE

## 2023-03-06 DIAGNOSIS — R97.20 ELEVATED PROSTATE SPECIFIC ANTIGEN (PSA): ICD-10-CM

## 2023-03-06 PROCEDURE — 72195 MRI PELVIS W/O DYE: CPT | Mod: 26 | Performed by: RADIOLOGY

## 2023-03-06 PROCEDURE — 72195 MRI PELVIS W/O DYE: CPT | Mod: 52

## 2023-03-08 ENCOUNTER — OFFICE VISIT (OUTPATIENT)
Dept: INTERNAL MEDICINE | Facility: CLINIC | Age: 73
End: 2023-03-08
Payer: MEDICARE

## 2023-03-08 VITALS
DIASTOLIC BLOOD PRESSURE: 74 MMHG | HEIGHT: 68 IN | HEART RATE: 61 BPM | SYSTOLIC BLOOD PRESSURE: 134 MMHG | OXYGEN SATURATION: 99 % | WEIGHT: 192 LBS | RESPIRATION RATE: 16 BRPM | TEMPERATURE: 98.3 F | BODY MASS INDEX: 29.1 KG/M2

## 2023-03-08 DIAGNOSIS — J43.9 PULMONARY EMPHYSEMA, UNSPECIFIED EMPHYSEMA TYPE (H): ICD-10-CM

## 2023-03-08 DIAGNOSIS — I25.10 CORONARY ARTERY DISEASE INVOLVING NATIVE CORONARY ARTERY OF NATIVE HEART WITHOUT ANGINA PECTORIS: Chronic | ICD-10-CM

## 2023-03-08 DIAGNOSIS — E03.8 OTHER SPECIFIED HYPOTHYROIDISM: Primary | ICD-10-CM

## 2023-03-08 DIAGNOSIS — N40.2 PROSTATE NODULE: ICD-10-CM

## 2023-03-08 LAB — TSH SERPL DL<=0.005 MIU/L-ACNC: 25.1 UIU/ML (ref 0.3–4.2)

## 2023-03-08 PROCEDURE — 84443 ASSAY THYROID STIM HORMONE: CPT | Performed by: INTERNAL MEDICINE

## 2023-03-08 PROCEDURE — 99214 OFFICE O/P EST MOD 30 MIN: CPT | Performed by: INTERNAL MEDICINE

## 2023-03-08 PROCEDURE — 36415 COLL VENOUS BLD VENIPUNCTURE: CPT | Performed by: INTERNAL MEDICINE

## 2023-03-08 RX ORDER — ALBUTEROL SULFATE 90 UG/1
2 AEROSOL, METERED RESPIRATORY (INHALATION) EVERY 6 HOURS PRN
Qty: 18 G | Refills: 3 | Status: SHIPPED | OUTPATIENT
Start: 2023-03-08

## 2023-03-08 NOTE — PROGRESS NOTES
Office Visit - Follow Up   Loy Reid   72 year old male    Date of Visit: 3/8/2023    Chief Complaint   Patient presents with     Hypertension     fasting        -------------------------------------------------------------------------------------------------------------------------  Assessment and Plan    Follow-up multiple issues, overall Loy is doing okay since our last meeting of December 2022.    This has been a bit of a stressful time for him, because his wife was diagnosed with a hematologic malignancy, and he has been taking care of her.  With that stress, Loy is slipped up and smoked few cigarettes.    He needed a renewal of his albuterol inhaler, which I told him is to give him quick immediate relief if he gets wheezy or shortness of breath from COPD.    He did undergo his lung screening CT scan on January 6, 2023, result below, but nothing concerning for lung malignancy.    He  will be meeting with urology in a week or 2 to come up with a strategy for a likely localized prostate cancer seen on MRI of the pelvis March 6, 2023.    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  If his TSH is again elevated, then I will tell Loy to take the 75 mcg tablets every day, and then we will check TSH again in another 3 months    I reminded Loy of the importance of taking his thyroid medication, since hypothyroidism could contribute to memory problems, fatigue, atherosclerosis, low HDL cholesterol, overweight, and blood pressure.      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  50 yrs, about 1 ppd    1-6-2023  IMPRESSION:  1.  Couple tiny pulmonary nodules.  2.  Severe coronary calcifications.  3.  Mild airway thickening.  4.  Emphysema.     LungRADS CATEGORY: 2S: Benign.  (<1% risk of malignancy)  -Perifissural nodule(s): <10 mm  -Solid nodule(s): <6 mm on baseline screening; or new nodule <4 mm  -Subsolid nodule(s): <6 mm on baseline screening  -Ground glass nodule(s): <30 mm; or greater than or equal to 30 mm and unchanged or slowly growing  -Category 3 or 4 nodules that are unchanged for greater than or equal to 3 months     RADIOLOGIST RECOMMENDATION: Continue annual screening with low-dose CT chest in 12 months.     COPD, Chronic bronchitis, with emphysema seen on CT scan  Used to take Spiriva  He confirmed for me December 2022 that he still experiences Dyspnea on exertion walking half a block. Gets respiratory infection about 1 a year    I filled out a handicap parking application for him, since he must stop to rest after walking less than 200 feet because of COPD     Pneumo Conj 13-V (2010&after) 05/06/2015   Pneumococcal 23 valent 01/01/2010      Likely localized prostate cancer seen on MRI of the pelvis March 6, 2023.  Elevated PSA  Loy reports some urinary urgency and slow flow  He will be meeting with urology, he will need a prostate biopsy to make a tissue diagnosis, and then, if cancer is confirmed, and I am anticipating the strategy could be radiation plus hormonal therapy.  11-  Prostate Specific Antigen Screen 0.0 - 4.5 ng/mL 6.9 High       3-1-2023 MRI  IMPRESSION:  1. Based on the most suspicious abnormality, this exam is characterized as PIRADS 5 - Clinically significant cancer is highly likely to be present.  The most suspicious abnormality is located at  the right and left anterior transition zone at the base and mid gland and there is long  segment capsular abutment indicating moderate suspicion of minimal extraprostatic extension.  2. No suspicious adenopathy or evidence of pelvic metastases.     History of shingles that affected his right forehead and scalp, near the eye, but did not have ocular involvement [facility], [facility]   I told Loy that shingles vaccination could be considered, which she would get at a community pharmacy, since it is paid under the Medicare prescription drug benefit.     Mixed hyperlipidemia  No angina claudication or TIAs.  He does take Lipitor 80 mg.  atorvastatin (LIPITOR) 80 MG tablet  12-8-2022  Direct Measure HDL >=40 mg/dL 28 Low      LDL Cholesterol Calculated <=100 mg/dL 94      Triglycerides <150 mg/dL 119      History of coronary artery stents placement, 2010  Heavy coronary artery calcifications were seen on his lung CT scan January 6, 2023, but he has no symptoms of angina.  Continue with aggressive medical management  aspirin 325 MG tablet  Clopidogrel 75 mg once a day  atorvastatin (LIPITOR) 80 MG tablet     Essential hypertension  metoprolol succinate (TOPROL-XL) 50 MG 24 hr tablet    I told Loy to start using his home blood pressure machine that goes on the right upper arm, record readings in a notebook, and bring the machine to his next clinic visit, so that we can validate it against a manual reading done on his right arm, to make sure his machine is accurate, then he can leave the numbers.     Longer-term, he is to work on losing weight and eating healthy, which will help his blood pressure and reduce dependence on medication.     If we need additional medication for blood pressure, I probably put him on losartan.     Target blood pressure is under 130 systolic when measured at rest in the seated position on the right upper arm.  BP Readings from Last 6 Encounters:   03/08/23 134/74   01/31/23 (!) 179/81   12/08/22 (!) 162/78   11/26/19 138/76     Obesity body mass  index 31.  Would like to see him lose about 15 pounds in the first 2023  He told with alcohol consumption is minimal.  He might eat in a restaurant once a month.  I reminded him about the importance of eating slowly, controlling portion size, and identifying problem foods to curtail or limit such starches, sweets, and fried foods.   Wt Readings from Last 5 Encounters:   03/08/23 87.1 kg (192 lb)   01/31/23 87.9 kg (193 lb 11.2 oz)   12/08/22 92.2 kg (203 lb 4.8 oz)   11/26/19 89.8 kg (198 lb)   03/28/19 85.3 kg (188 lb)     Special screening for malignant neoplasms, colon  12-  COLOGUARD-ABSTRACT Negative     COVID-19 Vaccine Bivalent Booster 18+ (Moderna) 12/08/2022         Immunization History   Administered Date(s) Administered     COVID-19 Vaccine 18+ (Moderna) 02/14/2021, 03/14/2021, 10/22/2021, 04/20/2022     COVID-19 Vaccine Bivalent Booster 18+ (Moderna) 12/08/2022     FLU 6-35 months 10/27/2015     FLUAD(HD)65+ QUAD 11/19/2020     Flu, Unspecified 10/28/2015     Influenza (High Dose) 3 valent vaccine 09/20/2017, 11/26/2019     Influenza (IIV3) PF 01/10/2013     Influenza Vaccine 65+ (Fluzone HD) 12/08/2022     Pneumo Conj 13-V (2010&after) 05/06/2015     Pneumococcal 23 valent 01/01/2010     Tdap (Adacel,Boostrix) 06/29/2015     Zoster vaccine, live 01/10/2013       --------------------------------------------------------------------------------------------------------------------------  History of Present Illness  This 72 year old old     Hypertension (fasting)        History of Present Illness         COPD:  He presents for follow up of COPD.  Overall, COPD symptoms are stable since last visit. He has a lot more than usual fatigue or shortness of breath with exertion and same as usual shortness of breath at rest.  He sometimes coughs and does not have change in sputum. No recent fever. He can walk 2-5 blocks without stopping to rest. He can walk 1 flights of stairs without resting.The patient has  had no ED, urgent care, or hospital admissions because of COPD since the last visit.      Hypertension: He presents for follow up of hypertension.  He does not check blood pressure  regularly outside of the clinic. Outside blood pressures have been over 140/90. He follows a low salt diet.      He eats 2-3 servings of fruits and vegetables daily.He consumes 0 sweetened beverage(s) daily.He exercises with enough effort to increase his heart rate 10 to 19 minutes per day.  He exercises with enough effort to increase his heart rate 6 days per week.   He is taking medications regularly.      Wt Readings from Last 3 Encounters:   03/08/23 87.1 kg (192 lb)   01/31/23 87.9 kg (193 lb 11.2 oz)   12/08/22 92.2 kg (203 lb 4.8 oz)     BP Readings from Last 3 Encounters:   03/08/23 134/74   01/31/23 (!) 179/81   12/08/22 (!) 162/78       Review of Systems: A comprehensive review of systems was negative except as noted.  ---------------------------------------------------------------------------------------------------------------------------    Medications, Allergies, Social, and Problem List   Current Outpatient Medications   Medication Sig Dispense Refill     aspirin (ASA) 325 MG tablet Take 1 tablet (325 mg) by mouth daily 90 tablet 3     atorvastatin (LIPITOR) 80 MG tablet Take 1 tablet (80 mg) by mouth daily 90 tablet 3     clopidogrel (PLAVIX) 75 MG tablet Take 1 tablet (75 mg) by mouth daily 90 tablet 3     levothyroxine (SYNTHROID/LEVOTHROID) 75 MCG tablet Take 1 tablet (75 mcg) by mouth daily 90 tablet 3     metoprolol succinate ER (TOPROL XL) 50 MG 24 hr tablet Take 1 tablet (50 mg) by mouth daily 90 tablet 3     nitroglycerin (NITROSTAT) 0.3 MG SL tablet [NITROGLYCERIN (NITROSTAT) 0.3 MG SL TABLET] Place 1 tablet (0.3 mg total) under the tongue every 5 (five) minutes as needed for chest pain. 20 tablet 3     No Known Allergies  Social History     Tobacco Use     Smoking status: Some Days     Types: Cigarettes      "Start date: 05/2022     Smokeless tobacco: Never     Tobacco comments:     Has 1 cigarette every so often     Patient Active Problem List   Diagnosis     Coronary artery disease involving native coronary artery     History of coronary artery stent placement     Mixed hyperlipidemia     Essential hypertension     Smoker     COPD (chronic obstructive pulmonary disease) (H)     Class 1 obesity due to excess calories without serious comorbidity with body mass index (BMI) of 31.0 to 31.9 in adult     Other specified hypothyroidism        Reviewed, reconciled and updated       Physical Exam   General Appearance:       /74 (BP Location: Right arm, Patient Position: Sitting, Cuff Size: Adult Regular)   Pulse 61   Temp 98.3  F (36.8  C)   Resp 16   Ht 1.715 m (5' 7.5\")   Wt 87.1 kg (192 lb)   SpO2 99%   BMI 29.63 kg/m      Appears well, does not appear dyspneic, blood pressure is okay, oxygen saturation 99%     Additional Information   I spent 30 minutes on this encounter, including reviewing interval history since last visit, examining the patient, explaining and counseling the issues enumerated in the Assessment and Plan (patient given a copy), ordering indicated tests, ordering prescriptions     CUATE KING MD, MD          "

## 2023-03-08 NOTE — PATIENT INSTRUCTIONS
Follow-up multiple issues, overall  is doing okay since our last meeting of December 2022.    This has been a bit of a stressful time for him, because his wife was diagnosed with a hematologic malignancy, and he has been taking care of her.  With that stress, Loy is slipped up and smoked few cigarettes.    He needed a renewal of his albuterol inhaler, which I told him is to give him quick immediate relief if he gets wheezy or shortness of breath from COPD.    He did undergo his lung screening CT scan on January 6, 2023, result below, but nothing concerning for lung malignancy.    He  will be meeting with urology in a week or 2 to come up with a strategy for a likely localized prostate cancer seen on MRI of the pelvis March 6, 2023.    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  If his TSH is again elevated, then I will tell Loy to take the 75 mcg tablets every day, and then we will check TSH again in another 3 months    I reminded Loy of the importance of taking his thyroid medication, since hypothyroidism could contribute to memory problems, fatigue, atherosclerosis, low HDL cholesterol, overweight, and blood pressure.      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  50 yrs, about 1 ppd    1-6-2023  IMPRESSION:  1.  Couple tiny pulmonary nodules.  2.  Severe coronary calcifications.  3.  Mild airway thickening.  4.  Emphysema.     LungRADS CATEGORY: 2S:  Benign.  (<1% risk of malignancy)  -Perifissural nodule(s): <10 mm  -Solid nodule(s): <6 mm on baseline screening; or new nodule <4 mm  -Subsolid nodule(s): <6 mm on baseline screening  -Ground glass nodule(s): <30 mm; or greater than or equal to 30 mm and unchanged or slowly growing  -Category 3 or 4 nodules that are unchanged for greater than or equal to 3 months     RADIOLOGIST RECOMMENDATION: Continue annual screening with low-dose CT chest in 12 months.     COPD, Chronic bronchitis, with emphysema seen on CT scan  Used to take Spiriva  He confirmed for me December 2022 that he still experiences Dyspnea on exertion walking half a block. Gets respiratory infection about 1 a year    I filled out a handicap parking application for him, since he must stop to rest after walking less than 200 feet because of COPD     Pneumo Conj 13-V (2010&after) 05/06/2015   Pneumococcal 23 valent 01/01/2010      Likely localized prostate cancer seen on MRI of the pelvis March 6, 2023.  Elevated PSA  Loy reports some urinary urgency and slow flow  He will be meeting with urology, he will need a prostate biopsy to make a tissue diagnosis, and then, if cancer is confirmed, and I am anticipating the strategy could be radiation plus hormonal therapy.  11-  Prostate Specific Antigen Screen 0.0 - 4.5 ng/mL 6.9 High       3-1-2023 MRI  IMPRESSION:  1. Based on the most suspicious abnormality, this exam is characterized as PIRADS 5 - Clinically significant cancer is highly likely to be present.  The most suspicious abnormality is located at  the right and left anterior transition zone at the base and mid gland and there is long segment capsular abutment indicating moderate suspicion of minimal extraprostatic extension.  2. No suspicious adenopathy or evidence of pelvic metastases.     History of shingles that affected his right forehead and scalp, near the eye, but did not have [facility] involvement [facility], [facility]   I told Loy that shingles vaccination could be considered, which she would get at a community pharmacy, since it is paid under the Medicare prescription drug benefit.     Mixed hyperlipidemia  No angina claudication or TIAs.  He does take Lipitor 80 mg.  atorvastatin (LIPITOR) 80 MG tablet  12-8-2022  Direct Measure HDL >=40 mg/dL 28 Low      LDL Cholesterol Calculated <=100 mg/dL 94      Triglycerides <150 mg/dL 119      History of coronary artery stents placement, 2010  Heavy coronary artery calcifications were seen on his lung CT scan January 6, 2023, but he has no symptoms of angina.  Continue with aggressive medical management  aspirin 325 MG tablet  Clopidogrel 75 mg once a day  atorvastatin (LIPITOR) 80 MG tablet     Essential hypertension  metoprolol succinate (TOPROL-XL) 50 MG 24 hr tablet    I told Loy to start using his home blood pressure machine that goes on the right upper arm, record readings in a notebook, and bring the machine to his next clinic visit, so that we can validate it against a manual reading done on his right arm, to make sure his machine is accurate, then he can leave the numbers.     Longer-term, he is to work on losing weight and eating healthy, which will help his blood pressure and reduce dependence on medication.     If we need additional medication for blood pressure, I probably put him on losartan.     Target blood pressure is under 130 systolic when measured at rest in the seated position on the right upper arm.  BP Readings from Last 6 Encounters:   03/08/23 134/74   01/31/23 (!) 179/81   12/08/22 (!) 162/78   11/26/19 138/76     Obesity body mass index 31.  Would like to see him lose about 15 pounds in the first 2023  He told with alcohol consumption is minimal.  He might eat in a restaurant once a month.  I reminded him about the importance of eating slowly, controlling portion size, and identifying problem foods to curtail or limit such  starches, sweets, and fried foods.   Wt Readings from Last 5 Encounters:   03/08/23 87.1 kg (192 lb)   01/31/23 87.9 kg (193 lb 11.2 oz)   12/08/22 92.2 kg (203 lb 4.8 oz)   11/26/19 89.8 kg (198 lb)   03/28/19 85.3 kg (188 lb)     Special screening for malignant neoplasms, colon  12-  COLOGUARD-ABSTRACT Negative     COVID-19 Vaccine Bivalent Booster 18+ (Moderna) 12/08/2022         Immunization History   Administered Date(s) Administered    COVID-19 Vaccine 18+ (Moderna) 02/14/2021, 03/14/2021, 10/22/2021, 04/20/2022    COVID-19 Vaccine Bivalent Booster 18+ (Moderna) 12/08/2022    FLU 6-35 months 10/27/2015    FLUAD(HD)65+ QUAD 11/19/2020    Flu, Unspecified 10/28/2015    Influenza (High Dose) 3 valent vaccine 09/20/2017, 11/26/2019    Influenza (IIV3) PF 01/10/2013    Influenza Vaccine 65+ (Fluzone HD) 12/08/2022    Pneumo Conj 13-V (2010&after) 05/06/2015    Pneumococcal 23 valent 01/01/2010    Tdap (Adacel,Boostrix) 06/29/2015    Zoster vaccine, live 01/10/2013

## 2023-03-14 ENCOUNTER — OFFICE VISIT (OUTPATIENT)
Dept: ONCOLOGY | Facility: CLINIC | Age: 73
End: 2023-03-14
Attending: STUDENT IN AN ORGANIZED HEALTH CARE EDUCATION/TRAINING PROGRAM
Payer: MEDICARE

## 2023-03-14 VITALS
SYSTOLIC BLOOD PRESSURE: 169 MMHG | HEART RATE: 69 BPM | DIASTOLIC BLOOD PRESSURE: 72 MMHG | BODY MASS INDEX: 29.33 KG/M2 | OXYGEN SATURATION: 96 % | WEIGHT: 193.5 LBS | HEIGHT: 68 IN

## 2023-03-14 DIAGNOSIS — R97.20 ELEVATED PROSTATE SPECIFIC ANTIGEN (PSA): Primary | ICD-10-CM

## 2023-03-14 PROCEDURE — 99213 OFFICE O/P EST LOW 20 MIN: CPT | Performed by: STUDENT IN AN ORGANIZED HEALTH CARE EDUCATION/TRAINING PROGRAM

## 2023-03-14 PROCEDURE — G0463 HOSPITAL OUTPT CLINIC VISIT: HCPCS | Performed by: STUDENT IN AN ORGANIZED HEALTH CARE EDUCATION/TRAINING PROGRAM

## 2023-03-14 ASSESSMENT — PAIN SCALES - GENERAL: PAINLEVEL: NO PAIN (0)

## 2023-03-14 NOTE — PROGRESS NOTES
"Urology Rooming Note    March 14, 2023 3:33 PM   Loy Reid is a 72 year old male who presents for:    Chief Complaint   Patient presents with     urology clinic     Elevated PSA     Initial Vitals: Ht 1.715 m (5' 7.52\")   BMI 29.61 kg/m   Estimated body mass index is 29.61 kg/m  as calculated from the following:    Height as of this encounter: 1.715 m (5' 7.52\").    Weight as of 3/8/23: 87.1 kg (192 lb). Body surface area is 2.04 meters squared.  Data Unavailable Comment: Data Unavailable     Allergies reviewed: Yes  Medications reviewed: Yes    Medications: Medication refills not needed today.  Pharmacy name entered into [facility]: [id number] - [place], [place] - [address] AT [facility] [place] [place] [address]      Taty Caal MA  "

## 2023-03-14 NOTE — LETTER
"    3/14/2023         RE: Loy Reid  [address]  [place] [address]        Dear Colleague,    Thank you for referring your patient, Loy Reid, to the [facility]. Please see a copy of my visit note below.    Urology Rooming Note    March 14, 2023 3:33 PM   Loy Reid is a 72 year old male who presents for:    Chief Complaint   Patient presents with     urology clinic     Elevated PSA     Initial Vitals: Ht 1.715 m (5' 7.52\")   BMI 29.61 kg/m   Estimated body mass index is 29.61 kg/m  as calculated from the following:    Height as of this encounter: 1.715 m (5' 7.52\").    Weight as of 3/8/23: 87.1 kg (192 lb). Body surface area is 2.04 meters squared.  Data Unavailable Comment: Data Unavailable     Allergies reviewed: Yes  Medications reviewed: Yes    Medications: Medication refills not needed today.  Pharmacy name entered into [facility]: [facility][id number] - [place], [place] - [address] [place] [facility] [place] [address] [facility]      Taty Caal MA    CHIEF COMPLAINT   It was my pleasure to see Loy Reid who is a 72 year old male for follow-up of elevated PSA.      HPI:  Loy Reid is a 72 year old male being seen for follow-up and discussion of MRI results.  Duration of problem: Few months  Previous treatments: None   accompanied by his spouse    He is here to discuss results of the MRI  No additional concerns today  Exam:  BP (!) 169/72 (BP Location: Right arm, Patient Position: Sitting, Cuff Size: Adult Large)   Pulse 69   Ht 1.715 m (5' 7.52\")   Wt 87.8 kg (193 lb 8 oz)   SpO2 96%   BMI 29.84 kg/m    General: age-appropriate appearing male in NAD sitting in an exam chair  Resp: no respiratory distress  CV: heart rate regular  Abdomen: Degree of obesity is mild. Abdomen is soft and nontender. No organomegaly.   Neuro: grossly non focal. Normal reflexes  Motor: excellent strength throughout    Review of Imaging:  The following " imaging exams were independently viewed and interpreted by me and discussed with patient:  MRI Abd/Pelvis: Abnormal: Reviewed the MRI images with the patient and his wife and discussed the findings.  I agree with the findings of the radiologist  Based on the most suspicious abnormality, this exam is  characterized as PIRADS 5 - Clinically significant cancer is highly  likely to be present.  The most suspicious abnormality is located at  the right and left anterior transition zone at the base and mid gland  and there is long segment capsular abutment indicating moderate  suspicion of minimal extraprostatic extension.    Review of Labs:  The following labs were reviewed by me and discussed with the patient:  PSA: Abnormal:    Latest Reference Range & Units 12/08/22 10:47   PSA Tumor Marker 0.00 - 6.50 ng/mL 7.20 (H)   (H): Data is abnormally high    Assessment & Plan     Elevated prostate specific antigen (PSA)  Discussed about doing prostate biopsy  Details of the procedure of the biopsy were discussed including prebiopsy preparations  Also discussed about the risk of postbiopsy sepsis and hematuria hematuria, hematospermia and blood in the stools  We will have the scheduling team reach out to him for biopsy scheduling he will also give a call to the [place] office to schedule a fusion biopsy        Burak Lugo MD  [facility]      ==========================    Additional Billing and Coding Information:  Review of external notes as documented above   Review of the result(s) of each unique test - PSA    Independent interpretation of a test performed by another physician/other qualified health care professional (not separately reported) -   MRI    Discussion of management or test interpretation with external physician/other qualified healthcare professional/appropriate source -           20 minutes spent on the date of the encounter doing chart review, review of test results,  interpretation of tests, patient visit, documentation and discussion with family     ==========================      Again, thank you for allowing me to participate in the care of your patient.        Sincerely,        Burak Lugo MD

## 2023-03-15 NOTE — PROGRESS NOTES
"CHIEF COMPLAINT   It was my pleasure to see Loy Reid who is a 72 year old male for follow-up of elevated PSA.      HPI:  Loy Reid is a 72 year old male being seen for follow-up and discussion of MRI results.  Duration of problem: Few months  Previous treatments: None   accompanied by his spouse    He is here to discuss results of the MRI  No additional concerns today  Exam:  BP (!) 169/72 (BP Location: Right arm, Patient Position: Sitting, Cuff Size: Adult Large)   Pulse 69   Ht 1.715 m (5' 7.52\")   Wt 87.8 kg (193 lb 8 oz)   SpO2 96%   BMI 29.84 kg/m    General: age-appropriate appearing male in NAD sitting in an exam chair  Resp: no respiratory distress  CV: heart rate regular  Abdomen: Degree of obesity is mild. Abdomen is soft and nontender. No organomegaly.   Neuro: grossly non focal. Normal reflexes  Motor: excellent strength throughout    Review of Imaging:  The following imaging exams were independently viewed and interpreted by me and discussed with patient:  MRI Abd/Pelvis: Abnormal: Reviewed the MRI images with the patient and his wife and discussed the findings.  I agree with the findings of the radiologist  Based on the most suspicious abnormality, this exam is  characterized as PIRADS 5 - Clinically significant cancer is highly  likely to be present.  The most suspicious abnormality is located at  the right and left anterior transition zone at the base and mid gland  and there is long segment capsular abutment indicating moderate  suspicion of minimal extraprostatic extension.    Review of Labs:  The following labs were reviewed by me and discussed with the patient:  PSA: Abnormal:    Latest Reference Range & Units 12/08/22 10:47   PSA Tumor Marker 0.00 - 6.50 ng/mL 7.20 (H)   (H): Data is abnormally high    Assessment & Plan     Elevated prostate specific antigen (PSA)  Discussed about doing prostate biopsy  Details of the procedure of the biopsy were discussed including prebiopsy " preparations  Also discussed about the risk of postbiopsy sepsis and hematuria hematuria, hematospermia and blood in the stools  We will have the scheduling team reach out to him for biopsy scheduling he will also give a call to the [place] office to schedule a fusion biopsy        Burak Lugo MD  [facility]      ==========================    Additional Billing and Coding Information:  Review of external notes as documented above   Review of the result(s) of each unique test - PSA    Independent interpretation of a test performed by another physician/other qualified health care professional (not separately reported) -   MRI    Discussion of management or test interpretation with external physician/other qualified healthcare professional/appropriate source -           20 minutes spent on the date of the encounter doing chart review, review of test results, interpretation of tests, patient visit, documentation and discussion with family     ==========================

## 2023-03-22 DIAGNOSIS — R97.20 ELEVATED PROSTATE SPECIFIC ANTIGEN (PSA): ICD-10-CM

## 2023-03-22 DIAGNOSIS — F41.9 ANXIETY: Primary | ICD-10-CM

## 2023-03-22 RX ORDER — DIAZEPAM 5 MG
5 TABLET ORAL
Qty: 2 TABLET | Refills: 0 | Status: SHIPPED | OUTPATIENT
Start: 2023-03-22 | End: 2023-06-08

## 2023-05-01 DIAGNOSIS — Z79.2 PROPHYLACTIC ANTIBIOTIC: Primary | ICD-10-CM

## 2023-05-01 RX ORDER — CIPROFLOXACIN 500 MG/1
500 TABLET, FILM COATED ORAL 2 TIMES DAILY
Qty: 6 TABLET | Refills: 0 | Status: SHIPPED | OUTPATIENT
Start: 2023-05-01 | End: 2023-05-04

## 2023-05-05 ENCOUNTER — OFFICE VISIT (OUTPATIENT)
Dept: UROLOGY | Facility: CLINIC | Age: 73
End: 2023-05-05
Payer: MEDICARE

## 2023-05-05 VITALS
HEART RATE: 64 BPM | SYSTOLIC BLOOD PRESSURE: 160 MMHG | HEIGHT: 68 IN | WEIGHT: 193 LBS | BODY MASS INDEX: 29.25 KG/M2 | DIASTOLIC BLOOD PRESSURE: 78 MMHG

## 2023-05-05 DIAGNOSIS — R97.20 ELEVATED PROSTATE SPECIFIC ANTIGEN (PSA): Primary | ICD-10-CM

## 2023-05-05 DIAGNOSIS — D29.1 BENIGN NEOPLASM OF PROSTATE: ICD-10-CM

## 2023-05-05 PROCEDURE — 96372 THER/PROPH/DIAG INJ SC/IM: CPT | Mod: 59 | Performed by: STUDENT IN AN ORGANIZED HEALTH CARE EDUCATION/TRAINING PROGRAM

## 2023-05-05 PROCEDURE — G0416 PROSTATE BIOPSY, ANY MTHD: HCPCS | Performed by: PATHOLOGY

## 2023-05-05 PROCEDURE — 55700 PR BIOPSY OF PROSTATE,NEEDLE/PUNCH: CPT | Performed by: STUDENT IN AN ORGANIZED HEALTH CARE EDUCATION/TRAINING PROGRAM

## 2023-05-05 PROCEDURE — 76942 ECHO GUIDE FOR BIOPSY: CPT | Performed by: STUDENT IN AN ORGANIZED HEALTH CARE EDUCATION/TRAINING PROGRAM

## 2023-05-05 RX ORDER — LIDOCAINE HYDROCHLORIDE 10 MG/ML
20 INJECTION, SOLUTION INFILTRATION; PERINEURAL ONCE
Status: COMPLETED | OUTPATIENT
Start: 2023-05-05 | End: 2023-05-05

## 2023-05-05 RX ADMIN — GENTAMICIN 80 MG: 40 INJECTION, SOLUTION INTRAMUSCULAR; INTRAVENOUS at 10:00

## 2023-05-05 RX ADMIN — LIDOCAINE HYDROCHLORIDE 20 ML: 10 INJECTION, SOLUTION INFILTRATION; PERINEURAL at 10:45

## 2023-05-05 NOTE — PROGRESS NOTES
Reason for visit: MRI ultrasound fusion prostate biopsy    Indications: Mr. Loy Francis a 72 year old-year-old gentleman followed in clinic for elevated PSA. He presents today for  MRI/ultrasound fusion prostate biopsy.    Procedure: After informed consent was obtained and after confirming the patient has been off aspirin or aspirin like products for a week, took his antibiotics and perform his enema, the patient was placed left side down on the procedure table. Digital rectal exam was performed revealing a normal feeling prostate. The ultrasound probe was lubricated and placed into the patient's rectum without difficulty. Inspection of the prostate revealed a few calcifications, but no obvious hypoechoic regions.   Next 5 ccs of lidocaine were injected at the junction of the prostate and the seminal vesicles bilaterally.  Measurement of the prostate were then obtained revealing a volume of 35 ccs.  We then performed an ultrasound sweep of the prostate. These post-processed images were then utilized by the UroNav software to create a 3-D prostate volume.  These images were then overlaid on the patient's MRI and MRI/ ultrasound fusion was performed. We then obtained 3cores from target #1. We then obtained another 12 cores in the standard sextant distribution with an additional core each from the left and right transition zones for a total of 15 cores obtained. The patient tolerated the procedure without difficulty.    The patient was counseled he could expect to see blood in his urine, semen, and stool for the next several days and should contact us should he develop any fevers chills or sweats. We'll see the patient back in a week to go over the results of his biopsy.    Burak Lugo MD  [facility]

## 2023-05-05 NOTE — NURSING NOTE
Chief Complaint   Patient presents with     Elevated PSA     Patient is here for Transrectal Ultrasound/MRI Guided Prostate Biopsies      Procedure was explained to patient prior to performing said procedure.  The patient signed the [place] consent form and all questions were answered prior to the procedure.  Any pre-procedural antibiotics were given according to the performing physician's recommendation.  Patient reviewed information on the labels attached to samples and confirmed the accuracy of all of the labels.     Performing Physician:  Dr. Lugo  Antibiotic taken?  yes  Aspirin or other blood thinning medications discontinued 7-10 days:  yes  Time of enema:  7:30am  Patient given Gentamicin intramuscular injection 30 minutes prior to procedure  Yes    The following medication was given:     MEDICATION: Gentamicin   ROUTE: IM  SITE: LUQ - Gluteus  DOSE:80mg/2ml  LOT #: [id number]  : [employer]  EXPIRATION DATE: 04/24  NDC#:  90602-944-03  Was there drug waste? No  Multi-dose vial: Yes    Using a 1 1/2 inch 21 guage needle medication drawn up as ordered with sterile syringe. Using sterile technique, a new 1 1/2 needle 21 gauge placed on syringe and patient cleansed with alcohol pad. Site was mapped out using palm of hand on the greater trochanter and forefinger on iliac crest. Using V technique between middle finger and index finger. Skin was tracted and Injection was given using a 90 degree angle dart method and after aspiration of needle and no blood, medication was slowly given via IM injection.  Patient tolerated injection well, and bandage placed on site following insertion removal.     Yu Crum LPN

## 2023-05-05 NOTE — PATIENT INSTRUCTIONS
[facility]  Transrectal Ultrasound  Post Operative Information    The physician who performed your Transrectal Ultrasound is Dr. Lugo (telephone number [phone] 8-4:30pm after hours please call [phone].  Please contact this doctor if you have any problems or questions.  If unable to reach your doctor, please return to the Emergency Department.    Take one antibiotic the evening of the procedure and then as directed on your prescription.  Drink at least 6-8 glasses of fluids for the first 48 hours.  Avoid heavy lifting and strenuous activity for 48 hours.  Avoid sexual intercourse for the first 24 hours.  No aspirin or ibuprofen products (Motrin, Advil, Nuprin, ect.) for one week.  You may take acetaminophen (Tylenol) for pain. You may take 2-500mg extra strength tylenol every 6 hours, not to exceed the daily recommendation of 4,000mg.   You may notice a small amount of blood on the tissue after a bowel movement.  You may pass blood with clots in your urine following the procedure.  The amount will decrease with time but may be visible for up to two weeks. If you have trouble urinating due to a possible blood clot, please call our office.  You make have blood in your semen for 4 weeks after the procedure.  You may experience mild perineal (groin area) discomfort after the procedure. If you were not prescribed a sedative, you may take a tub soak to alleviate groin discomfort.   Please call you doctor if you have any of the follow symptoms:  Fever over 101.1  Increase in the amount of blood passed, dark-red blood cherry color urine/Trouble Urinating  Severe discomfort or pain not well managed with methods above

## 2023-05-05 NOTE — NURSING NOTE
The following medication was given:     MEDICATION:  Lidocaine 1% Soln  ROUTE: Local Infiltration   SITE: Prostate  DOSE: 200mg/20ml  LOT #: [id number]  : [employer]  EXPIRATION DATE: 01/26  NDC#: 90934-184-82  Was there drug waste? Yes  Amount of drug waste (mL): 0.  Reason for waste:  As per MD  Multi-dose vial: Yes    Yu Crum LPN

## 2023-05-05 NOTE — LETTER
5/5/2023       RE: Loy Reid  [address]  [place] [address]     Dear Colleague,    Thank you for referring your patient, Loy Reid, to the [facility] [place] at [facility]. Please see a copy of my visit note below.    Reason for visit: MRI ultrasound fusion prostate biopsy    Indications: Mr. Loy Reidis a 72 year old-year-old gentleman followed in clinic for elevated PSA. He presents today for  MRI/ultrasound fusion prostate biopsy.    Procedure: After informed consent was obtained and after confirming the patient has been off aspirin or aspirin like products for a week, took his antibiotics and perform his enema, the patient was placed left side down on the procedure table. Digital rectal exam was performed revealing a normal feeling prostate. The ultrasound probe was lubricated and placed into the patient's rectum without difficulty. Inspection of the prostate revealed a few calcifications, but no obvious hypoechoic regions.   Next 5 ccs of lidocaine were injected at the junction of the prostate and the seminal vesicles bilaterally.  Measurement of the prostate were then obtained revealing a volume of 35 ccs.  We then performed an ultrasound sweep of the prostate. These post-processed images were then utilized by the UroNav software to create a 3-D prostate volume.  These images were then overlaid on the patient's MRI and MRI/ ultrasound fusion was performed. We then obtained 3cores from target #1. We then obtained another 12 cores in the standard sextant distribution with an additional core each from the left and right transition zones for a total of 15 cores obtained. The patient tolerated the procedure without difficulty.    The patient was counseled he could expect to see blood in his urine, semen, and stool for the next several days and should contact us should he develop any fevers chills or sweats. We'll see the  patient back in a week to go over the results of his biopsy.    Burak Lugo MD  [facility]

## 2023-05-08 LAB
PATH REPORT.COMMENTS IMP SPEC: ABNORMAL
PATH REPORT.COMMENTS IMP SPEC: ABNORMAL
PATH REPORT.COMMENTS IMP SPEC: YES
PATH REPORT.FINAL DX SPEC: ABNORMAL
PATH REPORT.GROSS SPEC: ABNORMAL
PATH REPORT.MICROSCOPIC SPEC OTHER STN: ABNORMAL
PATH REPORT.RELEVANT HX SPEC: ABNORMAL
PHOTO IMAGE: ABNORMAL

## 2023-05-09 RX ORDER — GENTAMICIN 40 MG/ML
80 INJECTION, SOLUTION INTRAMUSCULAR; INTRAVENOUS ONCE
Status: COMPLETED | OUTPATIENT
Start: 2023-05-05 | End: 2023-05-05

## 2023-05-16 ENCOUNTER — VIRTUAL VISIT (OUTPATIENT)
Dept: ONCOLOGY | Facility: CLINIC | Age: 73
End: 2023-05-16
Attending: STUDENT IN AN ORGANIZED HEALTH CARE EDUCATION/TRAINING PROGRAM
Payer: MEDICARE

## 2023-05-16 DIAGNOSIS — C61 PROSTATE CANCER (H): Primary | ICD-10-CM

## 2023-05-16 PROCEDURE — 99214 OFFICE O/P EST MOD 30 MIN: CPT | Mod: VID | Performed by: STUDENT IN AN ORGANIZED HEALTH CARE EDUCATION/TRAINING PROGRAM

## 2023-05-16 NOTE — PROGRESS NOTES
"Urology Rooming Note    May 16, 2023 2:39 PM   Loy Reid is a 72 year old male who presents for:    Chief Complaint   Patient presents with     Urology     Initial Vitals: There were no vitals taken for this visit. Estimated body mass index is 29.76 kg/m  as calculated from the following:    Height as of 5/5/23: 1.715 m (5' 7.52\").    Weight as of 5/5/23: 87.5 kg (193 lb). There is no height or weight on file to calculate BSA.  Data Unavailable Comment: Data Unavailable     Allergies reviewed: Yes  Medications reviewed: Yes    Medications: Medication refills not needed today.  Pharmacy name entered into [facility]: [id number] - [facility] [address] [place] [facility] [place] [address]      Kaleigh Moralez LPN  "

## 2023-05-16 NOTE — PROGRESS NOTES
Loy is a 72 year old who is being evaluated via a billable video visit.        Video-Visit Details    Type of service:  Video Visit   Video Start Time: 3:14 PM  Video End Time:3:31 PM    Originating Location (pt. Location): Home    Distant Location (provider location):  On-site  Platform used for Video Visit: [employer] HPI:  Loy Reid is a 72 year old male being seen for prostate biopsy results discussion   Duration of problem: Few months  Previous treatments: None        Reviewed previous notes  Doing well after the prostate biopsy  No issues with         Review of Systems:  From intake questionnaire     Skin: negative  Eyes: negative  Ears/Nose/Throat: negative  Respiratory: No shortness of breath, dyspnea on exertion, cough, or hemoptysis  Cardiovascular: No chest pain or palpitations  Gastrointestinal: negative; no nausea/vomiting, constipation or diarrhea  Genitourinary: as per HPI  Musculoskeletal: negative  Neurologic: negative  Psychiatric: negative  Hematologic/Lymphatic/Immunologic: negative  Endocrine: negative         Physical Exam:   This is a virtual visit    Alert, no acute distress, oriented, conversant    Ears/nose/mouth: mouth:normal, good dentition  Respiratory: no respiratory distress, or pursed lip breathing  Cardiovascular:no obvious jugular venous distension present  Skin: no suspicious lesions or rashes on Visible body parts on the Screen  Neuro: Alert, oriented, speech and mentation normal  Psych: affect and mood normal, alert and oriented to person, place and time  Review of Imaging:  The following imaging exams were independently viewed and interpreted by me and discussed with patient:  MRI Abd/Pelvis: Abnormal: 30 g prostate, PI-RADS 5 lesion    Review of Labs:  The following labs were reviewed by me and discussed with the patient:  PSA: Abnormal: 7.2  Surgical pathology:A.  Prostate, left mid: Biopsy:  - Benign prostatic tissue      B.  Prostate, left base: Biopsy:  - Benign prostatic  tissue      C.  Prostate, left apex: Biopsy:  - Atypical small acinar proliferation      D.  Prostate, right mid: Biopsy:  - Benign prostatic tissue      E.  Prostate, right base: Biopsy:  - Benign prostatic tissue      F.  Prostate, right apex: Biopsy:  - Benign prostatic tissue      G.  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  He has a risk of extraprostatic disease considering for prostate prostate cancer therefore I would recommend staging with CT abdomen pelvis and bone scan.  We discussed the options for treatment of a localized prostate cancer including active surveillance (reviewing the [place] experience), brachytherapy, external beam, and  proton beam radiation therapy, as well as surgical extirpation.  We briefly mentioned cryotherapy, but the results are not great in the primary setting and they almost uniformly lead to loss of erections.  We discussed the fact that all prostate cancer treatments leads to the loss or decrease in sexual function.  This loss usually occurs immediately with surgery and then improves as the patient heals and with radiation there is usually no effect, then it drops off at a faster than expected pace such that 2-3 years down the road, the number of men suffering from ED after treatment for their prostate cancer is approximately equal.    We also discussed the advantages and disadvantages and roles of open surgery vs. laparoscopic (and [place] assisted) surgery.  I noted that though robotic surgery has been associated with shorter hospitalization, shorter time to complete recovery, fewer blood transfusions and lower risk of bladder neck contractures, in  the most important domains, cancer control, preservation of urinary function and preservation of sexual function, the outcomes have been quite comparable.    The anticipated post-operative course was explained, including the anticipated hospital stay.  With surgery we discussed the need for a catheter post-operatively for between 7-10 days to serve as a scaffolding for the bladder neck to heal to the urethra.  We also discussed the risk of post operative urinary incontinence once the cathter is removed.  We discussed that we would recommend pelvic floor physical therapy and that sometimes urinary recovery takes several months to up to a year to recover control.  Approximately 90-95% of men are continent at one year after surgery, but most men describe that their continence is different after surgery.    I suggested a referral to radiation oncology which I believe is helpful to get adequate information to make the best decision that is best for the patient.  He is not interested in a radiation consult.  We discussed national survey results suggesting that for identical patients, surgeons and radiation oncologists suggest their respective method of treating prostate cancer as the most appropriate the majority of the time.  But for most cases of prostate cancer there appears to be clinical equipoise between these approaches and this allows the patient preferences to be expressed.      I discussed with him that I will have further discussions about the surgery once we have staging available.  We will set up a time for him to meet me once he has the results available  He expressed understanding and was agreeable  - CT Abdomen pelvis w & w/o contrast; Future  - NM Bone Scan Whole Body; Future      Burak Lugo MD  [facility]      ==========================    Additional Billing and Coding Information:  Review of external notes as documented above   Review of the result(s) of each unique  test - PSA, MRI    Independent interpretation of a test performed by another physician/other qualified health care professional (not separately reported) -   Surgical pathology    Discussion of management or test interpretation with external physician/other qualified healthcare professional/appropriate source -         25 minutes spent by me on the date of the encounter doing chart review, review of test results, interpretation of tests, patient visit and documentation

## 2023-05-31 ENCOUNTER — HOSPITAL ENCOUNTER (OUTPATIENT)
Dept: NUCLEAR MEDICINE | Facility: CLINIC | Age: 73
Discharge: HOME | End: 2023-05-31
Attending: STUDENT IN AN ORGANIZED HEALTH CARE EDUCATION/TRAINING PROGRAM
Payer: MEDICARE

## 2023-05-31 ENCOUNTER — HOSPITAL ENCOUNTER (OUTPATIENT)
Dept: CT IMAGING | Facility: CLINIC | Age: 73
Discharge: HOME | End: 2023-05-31
Attending: STUDENT IN AN ORGANIZED HEALTH CARE EDUCATION/TRAINING PROGRAM
Payer: MEDICARE

## 2023-05-31 DIAGNOSIS — C61 PROSTATE CANCER (H): ICD-10-CM

## 2023-05-31 LAB
CREAT BLD-MCNC: 1.4 MG/DL (ref 0.7–1.3)
GFR SERPL CREATININE-BSD FRML MDRD: 54 ML/MIN/1.73M2

## 2023-05-31 PROCEDURE — 82565 ASSAY OF CREATININE: CPT

## 2023-05-31 PROCEDURE — 343N000001 HC RX 343: Performed by: STUDENT IN AN ORGANIZED HEALTH CARE EDUCATION/TRAINING PROGRAM

## 2023-05-31 PROCEDURE — 250N000011 HC RX IP 250 OP 636: Performed by: STUDENT IN AN ORGANIZED HEALTH CARE EDUCATION/TRAINING PROGRAM

## 2023-05-31 PROCEDURE — G1010 CDSM STANSON: HCPCS

## 2023-05-31 PROCEDURE — 78306 BONE IMAGING WHOLE BODY: CPT | Mod: MG

## 2023-05-31 PROCEDURE — A9503 TC99M MEDRONATE: HCPCS | Performed by: STUDENT IN AN ORGANIZED HEALTH CARE EDUCATION/TRAINING PROGRAM

## 2023-05-31 RX ORDER — TC 99M MEDRONATE 20 MG/10ML
25-26 INJECTION, POWDER, LYOPHILIZED, FOR SOLUTION INTRAVENOUS ONCE
Status: COMPLETED | OUTPATIENT
Start: 2023-05-31 | End: 2023-05-31

## 2023-05-31 RX ORDER — IOPAMIDOL 755 MG/ML
100 INJECTION, SOLUTION INTRAVASCULAR ONCE
Status: COMPLETED | OUTPATIENT
Start: 2023-05-31 | End: 2023-05-31

## 2023-05-31 RX ADMIN — TC 99M MEDRONATE 25.7 MILLICURIE: 20 INJECTION, POWDER, LYOPHILIZED, FOR SOLUTION INTRAVENOUS at 12:06

## 2023-05-31 RX ADMIN — IOPAMIDOL 75 ML: 755 INJECTION, SOLUTION INTRAVENOUS at 11:58

## 2023-06-06 ENCOUNTER — OFFICE VISIT (OUTPATIENT)
Dept: ONCOLOGY | Facility: CLINIC | Age: 73
End: 2023-06-06
Attending: STUDENT IN AN ORGANIZED HEALTH CARE EDUCATION/TRAINING PROGRAM
Payer: MEDICARE

## 2023-06-06 ENCOUNTER — PATIENT OUTREACH (OUTPATIENT)
Dept: ONCOLOGY | Facility: CLINIC | Age: 73
End: 2023-06-06

## 2023-06-06 VITALS
WEIGHT: 189.2 LBS | HEART RATE: 60 BPM | SYSTOLIC BLOOD PRESSURE: 149 MMHG | RESPIRATION RATE: 18 BRPM | OXYGEN SATURATION: 96 % | BODY MASS INDEX: 29.18 KG/M2 | DIASTOLIC BLOOD PRESSURE: 81 MMHG

## 2023-06-06 DIAGNOSIS — J44.9 CHRONIC OBSTRUCTIVE PULMONARY DISEASE, UNSPECIFIED COPD TYPE (H): ICD-10-CM

## 2023-06-06 DIAGNOSIS — I25.10 CORONARY ARTERY DISEASE INVOLVING NATIVE CORONARY ARTERY OF NATIVE HEART, UNSPECIFIED WHETHER ANGINA PRESENT: Chronic | ICD-10-CM

## 2023-06-06 DIAGNOSIS — C61 PROSTATE CANCER (H): Primary | ICD-10-CM

## 2023-06-06 PROCEDURE — G0463 HOSPITAL OUTPT CLINIC VISIT: HCPCS | Performed by: STUDENT IN AN ORGANIZED HEALTH CARE EDUCATION/TRAINING PROGRAM

## 2023-06-06 PROCEDURE — 99214 OFFICE O/P EST MOD 30 MIN: CPT | Performed by: STUDENT IN AN ORGANIZED HEALTH CARE EDUCATION/TRAINING PROGRAM

## 2023-06-06 ASSESSMENT — PAIN SCALES - GENERAL: PAINLEVEL: NO PAIN (0)

## 2023-06-06 NOTE — PROGRESS NOTES
New Patient Radiation Oncology Nurse Navigator Note     Referring provider:   Burak Lugo MD  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  Prostatic enlargement and inhomogeneity. Urinary bladder wall thickening likely due to outlet obstruction.     2.  Colonic diverticula without CT evidence for diverticulitis.      EXAM: NM BONE SCAN WHOLE BODY  LOCATION: [facility]  DATE/TIME: 5/31/2023 4:00 PM CDT     IMPRESSION:     No osseous metastasis        Records Location (Care Everywhere, Media, etc.):   Epic     Previous radiation treatment:   NONE       I called Loy, to discuss referral to oncology.  I introduced my role and reviewed what this consult visit will entail/what to expect.  He wants to ensure that this is just informational at this point and he hasn't decided anything thus far.  I assured him this is just a consultation to discuss what is all involved with radiation and from there it will be discussed about any plans to move forward.      I reviewed the location and gave contact numbers including new patient scheduling and clinic phone numbers.   Loy, has no other questions at this time.    I forwarded on referral with scheduling instructions for the following   PLAN: Schedule: First  available, NEW, with rad onc provider for prostate ca @ [facility]    I transferred call to our new patient scheduling to finalize appointment.    Daiana Rivera, RN, BSN  Oncology New Patient Nurse Navigator   [facility]  [phone]

## 2023-06-06 NOTE — PROGRESS NOTES
"Urology Rooming Note    June 6, 2023 10:21 AM   Loy Reid is a 72 year old male who presents for:    Chief Complaint   Patient presents with     Urology     Return     Initial Vitals: BP (!) 149/81   Pulse 60   Resp 18   Wt 85.8 kg (189 lb 3.2 oz)   SpO2 96%   BMI 29.18 kg/m   Estimated body mass index is 29.18 kg/m  as calculated from the following:    Height as of 5/5/23: 1.715 m (5' 7.52\").    Weight as of this encounter: 85.8 kg (189 lb 3.2 oz). Body surface area is 2.02 meters squared.  No Pain (0) Comment: Data Unavailable     Allergies reviewed: Yes  Medications reviewed: Yes    Medications: Medication refills not needed today.  Pharmacy name entered into [facility]: [facility] #[id number] [address] [place] [facility] [place] [address]      Kaleigh Moralez LPN  "

## 2023-06-06 NOTE — LETTER
"    6/6/2023         RE: Loy Reid  [address] [address]        Dear Colleague,    Thank you for referring your patient, Loy Reid, to the [facility]. Please see a copy of my visit note below.    Urology Rooming Note    June 6, 2023 10:21 AM   Loy Reid is a 72 year old male who presents for:    Chief Complaint   Patient presents with     Urology     Return     Initial Vitals: BP (!) 149/81   Pulse 60   Resp 18   Wt 85.8 kg (189 lb 3.2 oz)   SpO2 96%   BMI 29.18 kg/m   Estimated body mass index is 29.18 kg/m  as calculated from the following:    Height as of 5/5/23: 1.715 m (5' 7.52\").    Weight as of this encounter: 85.8 kg (189 lb 3.2 oz). Body surface area is 2.02 meters squared.  No Pain (0) Comment: Data Unavailable     Allergies reviewed: Yes  Medications reviewed: Yes    Medications: Medication refills not needed today.  Pharmacy name entered into [employer]: [id number] - [place], [place] - [address] AT [facility] [address]      Kaleigh Moralze LPN    CHIEF COMPLAINT   It was my pleasure to see Loy Reid who is a 72 year old male for follow-up of prostate cancer .      HPI:  Loy Reid is a 72 year old male being seen for discussion of management.  Duration of problem: Few months  Previous treatments: None yet      accompanied by his spouse  Reviewed previous notes  He is here to review his CT and bone scan  He is also interested in discussing further treatment options with us  Loy also has significant limitations to activity, due to his cardiac health as well as his COPD  He is concerned about the side effects associated with the treatment options for prostate cancer and his quality of life  He is a current smoker and smokes 10 cigarettes a day    Exam:  BP (!) 149/81   Pulse 60   Resp 18   Wt 85.8 kg (189 lb 3.2 oz)   SpO2 96%   BMI 29.18 kg/m    General: age-appropriate appearing " male in NAD sitting in an exam chair  Resp: no respiratory distress  CV: heart rate regular  Abdomen: Degree of obesity is mild. Abdomen is soft and nontender. No organomegaly.   Neuro: grossly non focal. Normal reflexes  Motor: excellent strength throughout    Review of Imaging:  The following imaging exams were independently viewed and interpreted by me and discussed with patient:  CT Scan Abd/Pelvis: Normal, no evidence of metastatic disease  Bone scan: Normal    Review of Labs:  The following labs were reviewed by me and discussed with the patient:      Assessment & Plan     Prostate cancer (H)  Discussed about the 2 treatment options for Aaron 4+3 = 7 prostate cancer being radiation therapy and surgery for prostatectomy  Based on his overall general condition he may not be an ideal candidate for surgery specifically due to his coronary artery disease and COPD which have significant implications on his exercise tolerance  We discussed about the his surgical recovery which may not follow a clear trajectory due to his considerable comorbid illnesses  He talks about his quality of life which may be a concern specifically with surgery and the likelihood of incontinence  We discussed about radiation therapy and its utility in the such a scenario, he still has some reservations about it as well  I also discussed about how focal therapy is not the standard of care for him for multiple intermediate risk disease and could only be done on possibly clinical trial situations.  Currently we [facility] of those [facility]  and he may need to talk to other centers to see if there is something available for him.  I would recommend him touching base with our radiation oncologist here and discussing further with regards to definitive radiation therapy  I discussed about the fact that he would need adjuvant hormones for at least 6 months along with the radiation therapy treatment- Radiation Therapy  Referral; Future    Coronary artery disease involving native coronary artery of native heart, unspecified whether angina present  Has not seen cardiologist in years and wants a referral to see them  - Adult Cardiology Eval  Referral; Future    Chronic obstructive pulmonary disease, unspecified COPD type (H)  Baseline COPD, still a chronic smoker        Burak Lugo MD  [facility]      ==========================    Additional Billing and Coding Information:  Review of external notes as documented above   Review of the result(s) of each unique test - CT abdomen pelvis, bone scan    Independent interpretation of a test performed by another physician/other qualified health care professional (not separately reported) -       Discussion of management or test interpretation with external physician/other qualified healthcare professional/appropriate source -           35 minutes spent by me on the date of the encounter doing chart review, review of test results, interpretation of tests, patient visit, documentation and discussion with family     ==========================      Again, thank you for allowing me to participate in the care of your patient.        Sincerely,        Burak Lugo MD

## 2023-06-06 NOTE — PROGRESS NOTES
CHIEF COMPLAINT   It was my pleasure to see Loy Reid who is a 72 year old male for follow-up of prostate cancer .      HPI:  Loy Reid is a 72 year old male being seen for discussion of management.  Duration of problem: Few months  Previous treatments: None yet      accompanied by his spouse  Reviewed previous notes  He is here to review his CT and bone scan  He is also interested in discussing further treatment options with us  Loy also has significant limitations to activity, due to his cardiac health as well as his COPD  He is concerned about the side effects associated with the treatment options for prostate cancer and his quality of life  He is a current smoker and smokes 10 cigarettes a day    Exam:  BP (!) 149/81   Pulse 60   Resp 18   Wt 85.8 kg (189 lb 3.2 oz)   SpO2 96%   BMI 29.18 kg/m    General: age-appropriate appearing male in NAD sitting in an exam chair  Resp: no respiratory distress  CV: heart rate regular  Abdomen: Degree of obesity is mild. Abdomen is soft and nontender. No organomegaly.   Neuro: grossly non focal. Normal reflexes  Motor: excellent strength throughout    Review of Imaging:  The following imaging exams were independently viewed and interpreted by me and discussed with patient:  CT Scan Abd/Pelvis: Normal, no evidence of metastatic disease  Bone scan: Normal    Review of Labs:  The following labs were reviewed by me and discussed with the patient:      Assessment & Plan     Prostate cancer (H)  Discussed about the 2 treatment options for Aaron 4+3 = 7 prostate cancer being radiation therapy and surgery for prostatectomy  Based on his overall general condition he may not be an ideal candidate for surgery specifically due to his coronary artery disease and COPD which have significant implications on his exercise tolerance  We discussed about the his surgical recovery which may not follow a clear trajectory due to his considerable comorbid illnesses  He talks about  his quality of life which may be a concern specifically with surgery and the likelihood of incontinence  We discussed about radiation therapy and its utility in the such a scenario, he still has some reservations about it as well  I also discussed about how focal therapy is not the standard of care for him for multiple intermediate risk disease and could only be done on possibly clinical trial situations.  Currently we do not have any of those trials open here at the [facility] and he may need to talk to other centers to see if there is something available for him.  I would recommend him touching base with our radiation oncologist here and discussing further with regards to definitive radiation therapy  I discussed about the fact that he would need adjuvant hormones for at least 6 months along with the radiation therapy treatment- Radiation Therapy Referral; Future    Coronary artery disease involving native coronary artery of native heart, unspecified whether angina present  Has not seen cardiologist in years and wants a referral to see them  - Adult Cardiology Eval  Referral; Future    Chronic obstructive pulmonary disease, unspecified COPD type (H)  Baseline COPD, still a chronic smoker        Burak Lugo MD  [facility]      ==========================    Additional Billing and Coding Information:  Review of external notes as documented above   Review of the result(s) of each unique test - CT abdomen pelvis, bone scan    Independent interpretation of a test performed by another physician/other qualified health care professional (not separately reported) -       Discussion of management or test interpretation with external physician/other qualified healthcare professional/appropriate source -           35 minutes spent by me on the date of the encounter doing chart review, review of test results, interpretation of tests, patient visit, documentation and discussion with family      ==========================

## 2023-06-07 NOTE — TELEPHONE ENCOUNTER
RECORDS STATUS - ALL OTHER DIAGNOSIS      RECORDS RECEIVED FROM: [facility]   DATE RECEIVED: 6/7   NOTES STATUS DETAILS   OFFICE NOTE from referring provider Epic Burak Lugo MD in [facility] MEDICAL ONCOLOGY: 6/6/23   OPERATIVE REPORT Epic 5/5/23: MRI ultrasound fusion prostate biopsy   MEDICATION LIST [facility] 3/22/23   LABS     PATHOLOGY REPORTS [facility] 5/5/23: Surg Path    1/31/23: Non Gynecological Cytology   ANYTHING RELATED TO DIAGNOSIS Epic 5/31/23   GENONOMIC TESTING     TYPE: Epic 1/31/23: FISH   IMAGING (NEED IMAGES & REPORT)     CT SCANS PACS 5/31/23, 1/6/23: [facility]   MRI PACS 3/6/23, 3/1/23: [facility]   NM Bone Scan PACS 5/31/23: Epic

## 2023-06-08 ENCOUNTER — OFFICE VISIT (OUTPATIENT)
Dept: INTERNAL MEDICINE | Facility: CLINIC | Age: 73
End: 2023-06-08
Payer: MEDICARE

## 2023-06-08 VITALS
HEART RATE: 56 BPM | HEIGHT: 68 IN | SYSTOLIC BLOOD PRESSURE: 138 MMHG | OXYGEN SATURATION: 97 % | BODY MASS INDEX: 28.49 KG/M2 | WEIGHT: 188 LBS | RESPIRATION RATE: 16 BRPM | DIASTOLIC BLOOD PRESSURE: 64 MMHG | TEMPERATURE: 98.6 F

## 2023-06-08 DIAGNOSIS — Z72.0 TOBACCO ABUSE: ICD-10-CM

## 2023-06-08 DIAGNOSIS — I25.10 CORONARY ARTERY DISEASE INVOLVING NATIVE HEART WITHOUT ANGINA PECTORIS, UNSPECIFIED VESSEL OR LESION TYPE: ICD-10-CM

## 2023-06-08 DIAGNOSIS — C61 PROSTATE CANCER (H): Primary | ICD-10-CM

## 2023-06-08 DIAGNOSIS — I10 ESSENTIAL HYPERTENSION: Chronic | ICD-10-CM

## 2023-06-08 DIAGNOSIS — E03.4 HYPOTHYROIDISM DUE TO ACQUIRED ATROPHY OF THYROID: ICD-10-CM

## 2023-06-08 LAB
PSA SERPL DL<=0.01 NG/ML-MCNC: 6.76 NG/ML (ref 0–6.5)
T4 FREE SERPL-MCNC: 1.26 NG/DL (ref 0.9–1.7)
TSH SERPL DL<=0.005 MIU/L-ACNC: 25.2 UIU/ML (ref 0.3–4.2)

## 2023-06-08 PROCEDURE — 99213 OFFICE O/P EST LOW 20 MIN: CPT | Performed by: INTERNAL MEDICINE

## 2023-06-08 PROCEDURE — 84439 ASSAY OF FREE THYROXINE: CPT | Performed by: INTERNAL MEDICINE

## 2023-06-08 PROCEDURE — 84443 ASSAY THYROID STIM HORMONE: CPT | Performed by: INTERNAL MEDICINE

## 2023-06-08 PROCEDURE — 84153 ASSAY OF PSA TOTAL: CPT | Performed by: INTERNAL MEDICINE

## 2023-06-08 PROCEDURE — 36415 COLL VENOUS BLD VENIPUNCTURE: CPT | Performed by: INTERNAL MEDICINE

## 2023-06-08 RX ORDER — NITROGLYCERIN 0.3 MG/1
0.3 TABLET SUBLINGUAL EVERY 5 MIN PRN
Qty: 20 TABLET | Refills: 3 | Status: SHIPPED | OUTPATIENT
Start: 2023-06-08

## 2023-06-08 NOTE — PROGRESS NOTES
Office Visit - Follow Up   Loy Reid   72 year old male    Date of Visit: 6/8/2023    Chief Complaint   Patient presents with     Hypertension        -------------------------------------------------------------------------------------------------------------------------  Assessment and Plan    ADDENDUM:  TSH is still about 25, will increase his levothyroxine dose from 75 to 88 mcg/day, check TSH again in about 2-3 months.  Sent my chart message.      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  With that stress, Loy is slipped up and is smoking cigarettes.     Loy is in active discussion with urologist and also will be meeting with radiation oncologist to consider what strategy to use for his localized prostate cancer.  Loy is juggling between the option of radiation therapy plus hormones, versus surgery.    Considering his overall health, I suggested to Seriously consider the radiation plus hormonal option.    Today June 8, 2023, we will have Loy swing by the laboratory so we can check his TSH to monitor levothyroxine therapy, and also we will grab a PSA level.    I would like to see Loy back in 3 months.    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  50 yrs, about 1 ppd     1-6-2023  IMPRESSION:  1.  Couple tiny pulmonary nodules.  2.  Severe coronary calcifications.  3.  Mild airway thickening.  4.   Emphysema  RADIOLOGIST RECOMMENDATION: Continue annual screening with low-dose CT chest in 12 months.     COPD, Chronic bronchitis, with emphysema seen on CT scan  Used to take Spiriva  Experiences Dyspnea on exertion walking half a block. Gets respiratory infection about 1 a year     I filled out a handicap parking application for him, since he must stop to rest after walking less than 200 feet because of COPD     Pneumo Conj 13-V (2010&after) 05/06/2015   Pneumococcal 23 valent 01/01/2010      Localized prostate cancer seen on MRI of the pelvis March 6, 2023.  Elevated PSA  Loy reports some urinary urgency and slow flow  Loy is in active discussion with urologist and also will be meeting with radiation oncologist to consider what strategy to use for his localized prostate cancer.  Loy is juggling between the option of radiation therapy plus hormones, versus surgery.    11-  Prostate Specific Antigen Screen 0.0 - 4.5 ng/mL 6.9 High      5-  EXAM: CT ABDOMEN PELVIS W CONTRAST  LOCATION: [facility]  DATE/TIME: 5/31/2023 12:00 PM CDT  VASCULATURE: Calcific atheromatous change in the abdominal aorta. No aneurysm.                               IMPRESSION:   1.  Prostatic enlargement and inhomogeneity. Urinary bladder wall thickening likely due to outlet obstruction.  2.  Colonic diverticula without CT evidence for diverticulitis.    3-1-2023 MRI  IMPRESSION:  1. Based on the most suspicious abnormality, this exam is characterized as PIRADS 5 - Clinically significant cancer is highly likely to be present.  The most suspicious abnormality is located at  the right and left anterior transition zone at the base and mid gland and there is long segment capsular abutment indicating moderate suspicion of minimal extraprostatic extension.  2. No suspicious adenopathy or evidence of pelvic metastases.    History of shingles that affected his right forehead and scalp, near the eye, but  did not have [facility] involvement [facility]   I told Loy that shingles vaccination could be considered, which she would get at a community pharmacy, since it is paid under the Medicare prescription drug benefit.     Mixed hyperlipidemia  No angina claudication or TIAs.  He does take Lipitor 80 mg.  atorvastatin (LIPITOR) 80 MG tablet  12-8-2022  Direct Measure HDL >=40 mg/dL 28 Low       LDL Cholesterol Calculated <=100 mg/dL 94       Triglycerides <150 mg/dL 119       History of coronary artery stents placement, 2010  Heavy coronary artery calcifications were seen on his lung CT scan   Has no symptoms of angina or heart failure.    I told Loy that he does not need to pursue any cardiac testing as long as he remains asymptomatic and is already on maximal medical therapy, with aggressive control of lipids, and dual antiplatelet medication, and on blood pressure medication with good control  The one thing he could add is to stop smoking    aspirin 325 MG tablet  Clopidogrel 75 mg once a day  atorvastatin (LIPITOR) 80 MG tablet    Essential hypertension  metoprolol succinate (TOPROL-XL) 50 MG 24 hr tablet     Loy brought his home blood pressure machine to clinic today June 8, so we can validate it against a manual reading.     Longer-term, he is to work on losing weight and eating healthy, which will help his blood pressure and reduce dependence on medication.     If we need additional medication for blood pressure, I probably put him on losartan.     Target blood pressure is under 130 systolic when measured at rest in the seated position on the right upper arm.  BP Readings from Last 6 Encounters:   06/08/23 138/64   06/06/23 (!) 149/81   05/05/23 (!) 160/78   03/14/23 (!) 169/72   03/08/23 134/74   01/31/23 (!) 179/81     Obesity body mass index 31.  Would like to see him lose about 15 pounds in the first 2023  He told with alcohol consumption is minimal.  He might eat  in a restaurant once a month.  I reminded him about the importance of eating slowly, controlling portion size, and identifying problem foods to curtail or limit such starches, sweets, and fried foods.   Wt Readings from Last 5 Encounters:   06/08/23 85.3 kg (188 lb)   06/06/23 85.8 kg (189 lb 3.2 oz)   05/05/23 87.5 kg (193 lb)   03/14/23 87.8 kg (193 lb 8 oz)   03/08/23 87.1 kg (192 lb)     Special screening for malignant neoplasms, colon  12-  COLOGUARD-ABSTRACT Negative      COVID-19 Vaccine Bivalent Booster 18+ (Moderna) 12/08/2022     I suggested that Loy get a COVID booster at the same time he gets his seasonal flu shot around September 2023        --------------------------------------------------------------------------------------------------------------------------  History of Present Illness  This 72 year old old     COPD:  He presents for follow up of COPD.  Overall, COPD symptoms are stable since last visit. He has same as usual fatigue or shortness of breath with exertion and same as usual shortness of breath at rest.  He sometimes coughs and does not have change in sputum. No recent fever. He can walk the length of 3-5 rooms without stopping to rest. He can walk 2 flights of stairs without resting.The patient has had no ED, urgent care, or hospital admissions because of COPD since the last visit.      Hyperlipidemia:  He presents for follow up of hyperlipidemia.  He is taking medication to lower cholesterol. He is not having myalgia or other side effects to statin medications.     Hypertension: He presents for follow up of hypertension.  He does not check blood pressure  regularly outside of the clinic. Outside blood pressures have been over 140/90. He does not follow a low salt diet.      He eats 2-3 servings of fruits and vegetables daily.He consumes 0 sweetened beverage(s) daily.He exercises with enough effort to increase his heart rate 10 to 19 minutes per day.  He exercises with enough  effort to increase his heart rate 7 days per week.   He is taking medications regularly.    Wt Readings from Last 3 Encounters:   06/08/23 85.3 kg (188 lb)   06/06/23 85.8 kg (189 lb 3.2 oz)   05/05/23 87.5 kg (193 lb)     BP Readings from Last 3 Encounters:   06/08/23 138/64   06/06/23 (!) 149/81   05/05/23 (!) 160/78       ---------------------------------------------------------------------------------------------------------------------------    Medications, Allergies, Social, and Problem List       Current Outpatient Medications   Medication Sig Dispense Refill     albuterol (PROAIR HFA/PROVENTIL HFA/VENTOLIN HFA) 108 (90 Base) MCG/ACT inhaler Inhale 2 puffs into the lungs every 6 hours as needed for shortness of breath, wheezing or cough 18 g 3     aspirin (ASA) 325 MG tablet Take 1 tablet (325 mg) by mouth daily 90 tablet 3     atorvastatin (LIPITOR) 80 MG tablet Take 1 tablet (80 mg) by mouth daily 90 tablet 3     clopidogrel (PLAVIX) 75 MG tablet Take 1 tablet (75 mg) by mouth daily 90 tablet 3     levothyroxine (SYNTHROID/LEVOTHROID) 75 MCG tablet Take 1 tablet (75 mcg) by mouth daily 90 tablet 3     metoprolol succinate ER (TOPROL XL) 50 MG 24 hr tablet Take 1 tablet (50 mg) by mouth daily 90 tablet 3     nitroglycerin (NITROSTAT) 0.3 MG SL tablet [NITROGLYCERIN (NITROSTAT) 0.3 MG SL TABLET] Place 1 tablet (0.3 mg total) under the tongue every 5 (five) minutes as needed for chest pain. 20 tablet 3     diazepam (VALIUM) 5 MG tablet Take 1 tablet (5 mg) by mouth once as needed for anxiety To be taken the night before and 2 hours prior to the biopsy 2 tablet 0     No Known Allergies  Social History     Tobacco Use     Smoking status: Some Days     Types: Cigarettes     Start date: 05/2022     Smokeless tobacco: Never     Tobacco comments:     Has 1 cigarette every so often     Patient Active Problem List   Diagnosis     Coronary artery disease involving native coronary artery     History of coronary artery  "stent placement     Mixed hyperlipidemia     Essential hypertension     Smoker     COPD (chronic obstructive pulmonary disease) (H)     Class 1 obesity due to excess calories without serious comorbidity with body mass index (BMI) of 31.0 to 31.9 in adult     Other specified hypothyroidism        Reviewed, reconciled and updated       Physical Exam   General Appearance:       /64 (BP Location: Right arm, Patient Position: Sitting, Cuff Size: Adult Regular)   Pulse 56   Temp 98.6  F (37  C)   Resp 16   Ht 1.715 m (5' 7.5\")   Wt 85.3 kg (188 lb)   SpO2 97%   BMI 29.01 kg/m      Looks comfortable.  Lung sounds Scattered mild wheezes.     Additional Information   I spent 20 minutes on this encounter, including reviewing interval history since last visit, examining the patient, explaining and counseling the issues enumerated in the Assessment and Plan (patient given a copy), ordering indicated tests       CUATE KING MD, MD          "

## 2023-06-08 NOTE — PATIENT INSTRUCTIONS
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  With that stress, Loy is slipped up and is smoking cigarettes.     Loy is in active discussion with urologist and also will be meeting with radiation oncologist to consider what strategy to use for his localized prostate cancer.  Loy is juggling between the option of radiation therapy plus hormones, versus surgery.    Considering his overall health, I suggested to Seriously consider the radiation plus hormonal option.    Today June 8, 2023, we will have Captmayra swing by the laboratory so we can check his TSH to monitor levothyroxine therapy, and also we will grab a PSA level.    I would like to see Loy back in 3 months.      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  50 yrs, about 1 ppd     1-6-2023  IMPRESSION:  1.  Couple tiny pulmonary nodules.  2.  Severe coronary calcifications.  3.  Mild airway thickening.  4.  Emphysema  RADIOLOGIST RECOMMENDATION: Continue annual screening with low-dose CT chest in 12 months.     COPD, Chronic bronchitis, with emphysema seen on CT scan  Used to take Spiriva  Experiences Dyspnea on exertion walking half a block. Gets respiratory infection about 1 a year     I filled out a handicap parking application for him, since he must stop to rest after walking less than 200 feet because of COPD     Pneumo Conj 13-V (2010&after) 05/06/2015    Pneumococcal 23 valent 01/01/2010      Localized prostate cancer seen on MRI of the pelvis March 6, 2023.  Elevated PSA  Loy reports some urinary urgency and slow flow  Loy is in active discussion with urologist and also will be meeting with radiation oncologist to consider what strategy to use for his localized prostate cancer.  Loy is juggling between the option of radiation therapy plus hormones, versus surgery.    11-  Prostate Specific Antigen Screen 0.0 - 4.5 ng/mL 6.9 High      5-  EXAM: CT ABDOMEN PELVIS W CONTRAST  LOCATION: [facility]  DATE/TIME: 5/31/2023 12:00 PM CDT  VASCULATURE: Calcific atheromatous change in the abdominal aorta. No aneurysm.                               IMPRESSION:   1.  Prostatic enlargement and inhomogeneity. Urinary bladder wall thickening likely due to outlet obstruction.  2.  Colonic diverticula without CT evidence for diverticulitis.    3-1-2023 MRI  IMPRESSION:  1. Based on the most suspicious abnormality, this exam is characterized as PIRADS 5 - Clinically significant cancer is highly likely to be present.  The most suspicious abnormality is located at  the right and left anterior transition zone at the base and mid gland and there is long segment capsular abutment indicating moderate suspicion of minimal extraprostatic extension.  2. No suspicious adenopathy or evidence of pelvic metastases.    History of shingles that affected his right forehead and scalp, near the eye, but did not have ocular involvement when [facility], January 2021   I told Loy that shingles vaccination could be considered, which she would get at a community pharmacy, since it is paid under the Medicare prescription drug benefit.     Mixed hyperlipidemia  No angina claudication or TIAs.  He does take Lipitor 80 mg.  atorvastatin (LIPITOR) 80 MG tablet  12-8-2022  Direct Measure HDL >=40 mg/dL 28 Low       LDL Cholesterol  Calculated <=100 mg/dL 94       Triglycerides <150 mg/dL 119       History of coronary artery stents placement, 2010  Heavy coronary artery calcifications were seen on his lung CT scan   Has no symptoms of angina or heart failure.    I told Loy that he does not need to pursue any cardiac testing as long as he remains asymptomatic and is already on maximal medical therapy, with aggressive control of lipids, and dual antiplatelet medication, and on blood pressure medication with good control  The one thing he could add is to stop smoking    aspirin 325 MG tablet  Clopidogrel 75 mg once a day  atorvastatin (LIPITOR) 80 MG tablet    Essential hypertension  metoprolol succinate (TOPROL-XL) 50 MG 24 hr tablet     Loy brought his home blood pressure machine to clinic today June 8, so we can validate it against a manual reading.     Longer-term, he is to work on losing weight and eating healthy, which will help his blood pressure and reduce dependence on medication.     If we need additional medication for blood pressure, I probably put him on losartan.     Target blood pressure is under 130 systolic when measured at rest in the seated position on the right upper arm.  BP Readings from Last 6 Encounters:   06/08/23 138/64   06/06/23 (!) 149/81   05/05/23 (!) 160/78   03/14/23 (!) 169/72   03/08/23 134/74   01/31/23 (!) 179/81     Obesity body mass index 31.  Would like to see him lose about 15 pounds in the first 2023  He told with alcohol consumption is minimal.  He might eat in a restaurant once a month.  I reminded him about the importance of eating slowly, controlling portion size, and identifying problem foods to curtail or limit such starches, sweets, and fried foods.   Wt Readings from Last 5 Encounters:   06/08/23 85.3 kg (188 lb)   06/06/23 85.8 kg (189 lb 3.2 oz)   05/05/23 87.5 kg (193 lb)   03/14/23 87.8 kg (193 lb 8 oz)   03/08/23 87.1 kg (192 lb)     Special screening for malignant neoplasms,  colon  12-  COLOGUARD-ABSTRACT Negative      COVID-19 Vaccine Bivalent Booster 18+ (Moderna) 12/08/2022     I suggested that Loy get a COVID booster at the same time he gets his seasonal flu shot around September 2023

## 2023-06-09 RX ORDER — LEVOTHYROXINE SODIUM 88 UG/1
88 TABLET ORAL DAILY
Qty: 90 TABLET | Refills: 3 | Status: SHIPPED | OUTPATIENT
Start: 2023-06-09 | End: 2023-09-08 | Stop reason: DRUGHIGH

## 2023-06-13 ENCOUNTER — OFFICE VISIT (OUTPATIENT)
Dept: RADIATION ONCOLOGY | Facility: CLINIC | Age: 73
End: 2023-06-13
Attending: STUDENT IN AN ORGANIZED HEALTH CARE EDUCATION/TRAINING PROGRAM
Payer: MEDICARE

## 2023-06-13 ENCOUNTER — PRE VISIT (OUTPATIENT)
Dept: RADIATION ONCOLOGY | Facility: CLINIC | Age: 73
End: 2023-06-13
Payer: MEDICARE

## 2023-06-13 VITALS
RESPIRATION RATE: 18 BRPM | OXYGEN SATURATION: 96 % | DIASTOLIC BLOOD PRESSURE: 70 MMHG | HEART RATE: 53 BPM | SYSTOLIC BLOOD PRESSURE: 157 MMHG

## 2023-06-13 DIAGNOSIS — C61 PROSTATE CANCER (H): ICD-10-CM

## 2023-06-13 PROCEDURE — 99204 OFFICE O/P NEW MOD 45 MIN: CPT | Performed by: RADIOLOGY

## 2023-06-13 PROCEDURE — G0463 HOSPITAL OUTPT CLINIC VISIT: HCPCS | Performed by: RADIOLOGY

## 2023-06-13 ASSESSMENT — PAIN SCALES - GENERAL: PAINLEVEL: NO PAIN (0)

## 2023-06-13 NOTE — PROGRESS NOTES
[facility] Radiation Oncology Consult Note     Patient: Loy Reid  MRN: [id number]  Date of Service: 06/13/2023          Burak Lugo MD  [address] [place] [id number]       Dear Dr. Lugo:    Thank you very much for referring this patient for consideration of radiotherapy. As you know Mr. Reid is a 72 year old male with a diagnosis of unfavorable intermediate risk prostate cancer, clinical stage T2 a N0 M0, [place] grade 4+3 = 7 involving 3/15 cores, and a pretherapy PSA of 7.20.  Staging work-up including bone scan, CT abdomen pelvics and MRI scan showed no evidence of lymph nodes and bone metastasis.  The patient is referred to radiation oncology for evaluation and discussion of possible treatment options including definitive radiation therapy.    HISTORY OF PRESENT ILLNESS:   Mr. Reid is a 72 year old male with other medical condition including COPD, coronary artery disease and history of MI with stents placement in 2010.  The patient presented with history of elevation of PSA to 7.20 on 12/8/2022 for which he was a seeking further evaluation.  MRI of pelvics on 3/6/2023 showed 30 g prostate gland with a 21 x 21 mm PI-RADS 5 lesion in the right and the left base and mid gland transitional zone at the 10-1 o'clock position suspicious for malignancy.  There is a long segment capsular abutment indicating moderate suspicious of minimum extraprostatic extension.  There is no evidence of lymph nodes and bone metastasis.  Patient underwent ultrasound-guided needle biopsy on 5/5/2023.  The pathology from the targeted lesion showed prostatic acinar Adenocarcinoma, [place] score 4+3 = 7 involving 3 of 3 cores, representing 30% total core volume.  The remaining 12 cores of biopsy was negative.  Patient underwent staging work-up including bone scan and CT abdomen pelvics which showed no evidence of lymph nodes and systemic metastasis.  You have discussed with the patient about various  treatment options, the patient is not an ideal candidate for surgery given his current medical status.  He is referred to radiation oncology for evaluation and discussion of possible radiation therapy.    CHEMOTHERAPY HISTORY: Concurrent Chemotherapy: No    RADIATION THERAPY HISTORY: Prior Radiation: No    IMPLANTED CARDIAC DEVICE: none     Current Outpatient Medications   Medication Sig Dispense Refill     albuterol (PROAIR HFA/PROVENTIL HFA/VENTOLIN HFA) 108 (90 Base) MCG/ACT inhaler Inhale 2 puffs into the lungs every 6 hours as needed for shortness of breath, wheezing or cough 18 g 3     aspirin (ASA) 325 MG tablet Take 1 tablet (325 mg) by mouth daily 90 tablet 3     atorvastatin (LIPITOR) 80 MG tablet Take 1 tablet (80 mg) by mouth daily 90 tablet 3     clopidogrel (PLAVIX) 75 MG tablet Take 1 tablet (75 mg) by mouth daily 90 tablet 3     levothyroxine (SYNTHROID/LEVOTHROID) 88 MCG tablet Take 1 tablet (88 mcg) by mouth daily 90 tablet 3     metoprolol succinate ER (TOPROL XL) 50 MG 24 hr tablet Take 1 tablet (50 mg) by mouth daily 90 tablet 3     nitroGLYcerin (NITROSTAT) 0.3 MG sublingual tablet Place 1 tablet (0.3 mg) under the tongue every 5 minutes as needed for chest pain 20 tablet 3     Past Medical History:   Diagnosis Date     Myocardial infarction (H)      Prostate cancer (H)      PVD (peripheral vascular disease) (H)      Tobacco use      Past Surgical History:   Procedure Laterality Date     FOOT FRACTURE SURGERY       No Known Allergies  Family History   Problem Relation Age of Onset     Cerebrovascular Disease Mother      Coronary Artery Disease Mother      Cerebrovascular Disease Father      Heart Disease Father      Coronary Artery Disease Sister      Heart Disease Brother      Social History     Socioeconomic History     Marital status:      Spouse name: Not on file     Number of children: Not on file     Years of education: Not on file     Highest education level: Not on file    Occupational History     Not on file   Tobacco Use     Smoking status: Some Days     Types: Cigarettes     Start date: 05/2022     Smokeless tobacco: Never     Tobacco comments:     Has 1 cigarette every so often   Vaping Use     Vaping status: Not on file   Substance and Sexual Activity     Alcohol use: Not on file     Drug use: Not on file     Sexual activity: Not on file   Other Topics Concern     Not on file   Social History Narrative     Not on file     Social Determinants of Health     Financial Resource Strain: Not on file   Food Insecurity: Not on file   Transportation Needs: Not on file   Physical Activity: Not on file   Stress: Not on file   Social Connections: Not on file   Intimate Partner Violence: Not on file   Housing Stability: Not on file        REVIEW OF SYMPTOMS:  A full 14-point review of systems was performed. Pertinent findings are noted in the HPI.    General  Constitutional  Constitutional (WDL): Exceptions to WDL  Fatigue: Fatigue relieved by rest  EENT  Eye Disorders  Eye Disorder (WDL): All eye disorder elements are within defined limits  Ear Disorders  Ear Disorder (WDL): All ear disorder elements are within defined limits  Respiratory  Respiratory  Respiratory (WDL): Exceptions to WDL  Cough: Mild symptoms OR nonprescription intervention indicated  Cardiovascular  Cardiovascular  Cardiovascular (WDL): All cardiovascular elements are within defined limits (Hx of MI)  Gastrointestinal  Gastrointestinal  Gastrointestinal (WDL): All gastrointestinal elements are within defined limits  Musculoskeletal  Musculoskeletal and Connective Tissue Disorders  Musculoskeletal & Connective (WDL): All musculoskeletal & connective elements are within defined limits  Integumentary  Integumentary  Integumentary (WDL): All integumentary elements are within defined limits  Neurological  Neurosensory  Neurosensory (WDL): All neurosensory elements are within defined  limits  Genitourinary/Reproductive  Genitourinary  Genitourinary (WDL): All genitourinary elements are within defined limits  Lymphatic  Lymph System Disorders  Lymph (WDL): All lymph elements are within defined limits  Pain  Pain Score: No Pain (0)  AUA Assessment                                                              Accompanied by  Accompanied By: spouse    ECOG Status: 0    Prostate Specific Antigen Screen   Date Value Ref Range Status   12/08/2022 7.20 (H) 0.00 - 6.50 ng/mL Final   11/26/2019 6.9 (H) 0.0 - 4.5 ng/mL Final     PSA Tumor Marker   Date Value Ref Range Status   06/08/2023 6.76 (H) 0.00 - 6.50 ng/mL Final     Imaging: Reviewed    Pathology: Reviewed    Objective:      PHYSICAL EXAMINATION:    BP (!) 157/70   Pulse 53   Resp 18   SpO2 96%     Gen: Alert, in NAD  Eyes: PERRL, EOMI, sclera anicteric  HENT     Head: NC/AT     Ears: No external auricular lesions     Nose/sinus: No rhinorrhea or epistaxis     Oropharynx: MMM, no visible oral lesions  Neck: Supple, full ROM, no LAD  Pulm: No wheezing, stridor or respiratory distress  CV: Well-perfused, no cyanosis, no pedal edema  Abdominal: BS+, soft, nontender, nondistended, no hepatomegaly  Back: No step-offs or pain to palpation along the thoracolumbar spine  Rectal: Deferred  : Deferred  Musculoskeletal: Normal muscle bulk and tone  Skin: Normal color and turgor  Neurologic: A/Ox3, CN II-XII intact, normal gait and station  Psychiatric: Appropriate mood and affect     Impression     unfavorable intermediate risk prostate cancer, clinical stage T2 a N0 M0, [place] grade 4+3 = 7 involving 3/15 cores, and a pretherapy PSA of 7.20.  Staging work-up including bone scan, CT abdomen pelvics and MRI scan showed no evidence of lymph nodes and bone metastasis.     Assessment & Plan:     I have personally reviewed his upcoming medical record today.  I have also discussed with the patient about all the possible treatment options for the prostate cancer  including clinical observation, hormonal therapy, surgery, cryotherapy, and radiation therapy in the form of radioactive seed implant and SBRT/IMRT/IGRT/TomoTherapy.  The NCCN guideline for prostate cancer treatment recommendation has also been reviewed with the patient.  The possible risks and the side effects of radiation therapy have been discussed with the patient in detail and at a great length.  Patient is also informed that all the curative options have equal chance of cure for his prostate cancer with different side effect profile.  The patient has to make his own decision regarding treatment choice.  He seems to understand it well.  Given the status of T2a disease, [place] grade 7 and a pretherapy PSA of 7.2, I think the patient has approximately 39-51% chance of organ-confined disease, 33-45% chance to have capsular penetration, 7-16% risk of seminal vesicle involvement and 2-8% risk of lymph node metastasis based on Dr. Price's table.  Given the evidence of [place] 7 disease and the good health status, I think it is reasonable to consider some form of definitive therapy.  The pros and the cons of different treatment options have also been discussed with the patient in detail and at great length.  He seems to understand it well.  The patient is going to think it over and make his final decision in the near future.  He is not interested in hormone therapy given his cardiac issues.  He is informed to contact radiation oncology and set up time for follow-up if he decide to proceed with radiation therapy.  The patient will be a reasonable candidate for radiation therapy to a total dose of 7020 cGy in 26 treatments targeted to the prostate gland only.    Again, thank you very much for the referral and allowing me to participate in the care of this patient.  If you have any questions or concerns about this consultation, please do not hesitate to call.  I spent approximately 45 minutes today with the patient and  80% time was used for counseling.      Sincerely,      Lacey Young MD  Department of Radiation Oncology   [facility] Radiation Oncology  Tel: [phone]  Page: [phone]    [facility]  [address] [place] [address]     [facility]   [address] [place] [address]    CC:  Patient Care Team:  López Barahona MD as PCP - General  López Barahona MD as Assigned PCP  Burak Lugo MD as MD (Urology)  Burak Lugo MD as Assigned Surgical Provider  Lacey Young MD as MD (Radiation Oncology)  Linda Ochoa MD as MD (Cardiovascular Disease)

## 2023-06-13 NOTE — LETTER
6/13/2023         RE: Loy Reid  [address] [facility] [address]        Dear Colleague,    Thank you for referring your patient, Loy Reid, to the [facility] RADIATION ONCOLOGY [place]. Please see a copy of my visit note below.    [facility] Radiation Oncology Consult Note     Patient: Loy Reid  MRN: [id number]  Date of Service: 06/13/2023          Burak Lugo MD  [address] [place] [id number]       Dear Dr. Lugo:    Thank you very much for referring this patient for consideration of radiotherapy. As you know Mr. Reid is a 72 year old male with a diagnosis of unfavorable intermediate risk prostate cancer, clinical stage T2 a N0 M0, Aaron grade 4+3 = 7 involving 3/15 cores, and a pretherapy PSA of 7.20.  Staging work-up including bone scan, CT abdomen pelvics and MRI scan showed no evidence of lymph nodes and bone metastasis.  The patient is referred to radiation oncology for evaluation and discussion of possible treatment options including definitive radiation therapy.    HISTORY OF PRESENT ILLNESS:   Mr. Reid is a 72 year old male with other medical condition including COPD, coronary artery disease and history of MI with stents placement in 2010.  The patient presented with history of elevation of PSA to 7.20 on 12/8/2022 for which he was a seeking further evaluation.  MRI of pelvics on 3/6/2023 showed 30 g prostate gland with a 21 x 21 mm PI-RADS 5 lesion in the right and the left base and mid gland transitional zone at the 10-1 o'clock position suspicious for malignancy.  There is a long segment capsular abutment indicating moderate suspicious of minimum extraprostatic extension.  There is no evidence of lymph nodes and bone metastasis.  Patient underwent ultrasound-guided needle biopsy on 5/5/2023.  The pathology from the targeted lesion showed prostatic acinar Adenocarcinoma, Aaron score 4+3 = 7 involving 3 of 3 cores, representing 30%  total core volume.  The remaining 12 cores of biopsy was negative.  Patient underwent staging work-up including bone scan and CT abdomen pelvics which showed no evidence of lymph nodes and systemic metastasis.  You have discussed with the patient about various treatment options, the patient is not an ideal candidate for surgery given his current medical status.  He is referred to radiation oncology for evaluation and discussion of possible radiation therapy.    CHEMOTHERAPY HISTORY: Concurrent Chemotherapy: No    RADIATION THERAPY HISTORY: Prior Radiation: No    IMPLANTED CARDIAC DEVICE: none     Current Outpatient Medications   Medication Sig Dispense Refill     albuterol (PROAIR HFA/PROVENTIL HFA/VENTOLIN HFA) 108 (90 Base) MCG/ACT inhaler Inhale 2 puffs into the lungs every 6 hours as needed for shortness of breath, wheezing or cough 18 g 3     aspirin (ASA) 325 MG tablet Take 1 tablet (325 mg) by mouth daily 90 tablet 3     atorvastatin (LIPITOR) 80 MG tablet Take 1 tablet (80 mg) by mouth daily 90 tablet 3     clopidogrel (PLAVIX) 75 MG tablet Take 1 tablet (75 mg) by mouth daily 90 tablet 3     levothyroxine (SYNTHROID/LEVOTHROID) 88 MCG tablet Take 1 tablet (88 mcg) by mouth daily 90 tablet 3     metoprolol succinate ER (TOPROL XL) 50 MG 24 hr tablet Take 1 tablet (50 mg) by mouth daily 90 tablet 3     nitroGLYcerin (NITROSTAT) 0.3 MG sublingual tablet Place 1 tablet (0.3 mg) under the tongue every 5 minutes as needed for chest pain 20 tablet 3     Past Medical History:   Diagnosis Date     Myocardial infarction (H)      Prostate cancer (H)      PVD (peripheral vascular disease) (H)      Tobacco use      Past Surgical History:   Procedure Laterality Date     FOOT FRACTURE SURGERY       No Known Allergies  Family History   Problem Relation Age of Onset     Cerebrovascular Disease Mother      Coronary Artery Disease Mother      Cerebrovascular Disease Father      Heart Disease Father      Coronary Artery  Disease Sister      Heart Disease Brother      Social History     Socioeconomic History     Marital status:      Spouse name: Not on file     Number of children: Not on file     Years of education: Not on file     Highest education level: Not on file   Occupational History     Not on file   Tobacco Use     Smoking status: Some Days     Types: Cigarettes     Start date: 05/2022     Smokeless tobacco: Never     Tobacco comments:     Has 1 cigarette every so often   Vaping Use     Vaping status: Not on file   Substance and Sexual Activity     Alcohol use: Not on file     Drug use: Not on file     Sexual activity: Not on file   Other Topics Concern     Not on file   Social History Narrative     Not on file     Social Determinants of Health     Financial Resource Strain: Not on file   Food Insecurity: Not on file   Transportation Needs: Not on file   Physical Activity: Not on file   Stress: Not on file   Social Connections: Not on file   Intimate Partner Violence: Not on file   Housing Stability: Not on file        REVIEW OF SYMPTOMS:  A full 14-point review of systems was performed. Pertinent findings are noted in the HPI.    General  Constitutional  Constitutional (WDL): Exceptions to WDL  Fatigue: Fatigue relieved by rest  EENT  Eye Disorders  Eye Disorder (WDL): All eye disorder elements are within defined limits  Ear Disorders  Ear Disorder (WDL): All ear disorder elements are within defined limits  Respiratory  Respiratory  Respiratory (WDL): Exceptions to WDL  Cough: Mild symptoms OR nonprescription intervention indicated  Cardiovascular  Cardiovascular  Cardiovascular (WDL): All cardiovascular elements are within defined limits (Hx of MI)  Gastrointestinal  Gastrointestinal  Gastrointestinal (WDL): All gastrointestinal elements are within defined limits  Musculoskeletal  Musculoskeletal and Connective Tissue Disorders  Musculoskeletal & Connective (WDL): All musculoskeletal & connective elements are within  defined limits  Integumentary  Integumentary  Integumentary (WDL): All integumentary elements are within defined limits  Neurological  Neurosensory  Neurosensory (WDL): All neurosensory elements are within defined limits  Genitourinary/Reproductive  Genitourinary  Genitourinary (WDL): All genitourinary elements are within defined limits  Lymphatic  Lymph System Disorders  Lymph (WDL): All lymph elements are within defined limits  Pain  Pain Score: No Pain (0)  AUA Assessment                                                              Accompanied by  Accompanied By: spouse    ECOG Status: 0    Prostate Specific Antigen Screen   Date Value Ref Range Status   12/08/2022 7.20 (H) 0.00 - 6.50 ng/mL Final   11/26/2019 6.9 (H) 0.0 - 4.5 ng/mL Final     PSA Tumor Marker   Date Value Ref Range Status   06/08/2023 6.76 (H) 0.00 - 6.50 ng/mL Final     Imaging: Reviewed    Pathology: Reviewed    Objective:      PHYSICAL EXAMINATION:    BP (!) 157/70   Pulse 53   Resp 18   SpO2 96%     Gen: Alert, in NAD  Eyes: PERRL, EOMI, sclera anicteric  HENT     Head: NC/AT     Ears: No external auricular lesions     Nose/sinus: No rhinorrhea or epistaxis     Oropharynx: MMM, no visible oral lesions  Neck: Supple, full ROM, no LAD  Pulm: No wheezing, stridor or respiratory distress  CV: Well-perfused, no cyanosis, no pedal edema  Abdominal: BS+, soft, nontender, nondistended, no hepatomegaly  Back: No step-offs or pain to palpation along the thoracolumbar spine  Rectal: Deferred  : Deferred  Musculoskeletal: Normal muscle bulk and tone  Skin: Normal color and turgor  Neurologic: A/Ox3, CN II-XII intact, normal gait and station  Psychiatric: Appropriate mood and affect     Impression     unfavorable intermediate risk prostate cancer, clinical stage T2 a N0 M0, Aaron grade 4+3 = 7 involving 3/15 cores, and a pretherapy PSA of 7.20.  Staging work-up including bone scan, CT abdomen pelvics and MRI scan showed no evidence of lymph nodes  and bone metastasis.     Assessment & Plan:     I have personally reviewed his upcoming medical record today.  I have also discussed with the patient about all the possible treatment options for the prostate cancer including clinical observation, hormonal therapy, surgery, cryotherapy, and radiation therapy in the form of radioactive seed implant and SBRT/IMRT/IGRT/TomoTherapy.  The NCCN guideline for prostate cancer treatment recommendation has also been reviewed with the patient.  The possible risks and the side effects of radiation therapy have been discussed with the patient in detail and at a great length.  Patient is also informed that all the curative options have equal chance of cure for his prostate cancer with different side effect profile.  The patient has to make his own decision regarding treatment choice.  He seems to understand it well.  Given the status of T2a disease, Anmoore grade 7 and a pretherapy PSA of 7.2, I think the patient has approximately 39-51% chance of organ-confined disease, 33-45% chance to have capsular penetration, 7-16% risk of seminal vesicle involvement and 2-8% risk of lymph node metastasis based on Dr. Price's table.  Given the evidence of Aaron 7 disease and the good health status, I think it is reasonable to consider some form of definitive therapy.  The pros and the cons of different treatment options have also been discussed with the patient in detail and at great length.  He seems to understand it well.  The patient is going to think it over and make his final decision in the near future.  He is not interested in hormone therapy given his cardiac issues.  He is informed to contact radiation oncology and set up time for follow-up if he decide to proceed with radiation therapy.  The patient will be a reasonable candidate for radiation therapy to a total dose of 7020 cGy in 26 treatments targeted to the prostate gland only.    Again, thank you very much for the referral  "and allowing me to participate in the care of this patient.  If you have any questions or concerns about this consultation, please do not hesitate to call.  I spent approximately 45 minutes today with the patient and 80% time was used for counseling.      Sincerely,      Lacey Young MD  Department of Radiation Oncology   [facility] Radiation Oncology  Tel: [phone]  Page: [phone]    [facility]  [address]   [place] [address]     [facility]   [address] [facility] [place] [address]    CC:  Patient Care Team:  López Barahona MD as PCP - General  López Barahona MD as Assigned PCP  Burak Lugo MD as MD (Urology)  Burak Lugo MD as Assigned Surgical Provider  Lacey Young MD as MD (Radiation Oncology)  Linda Ochoa MD as MD (Cardiovascular Disease)        Oncology Rooming Note    No prior RT  No ICD    June 13, 2023 11:35 AM   Loy Reid is a 72 year old male who presents for:    Chief Complaint   Patient presents with     Oncology Clinic Visit     Prostate Cancer     Rad Onc consult     Initial Vitals: BP (!) 157/70   Pulse 53   Resp 18   SpO2 96%  Estimated body mass index is 29.01 kg/m  as calculated from the following:    Height as of 6/8/23: 1.715 m (5' 7.5\").    Weight as of 6/8/23: 85.3 kg (188 lb). There is no height or weight on file to calculate BSA.  No Pain (0) Comment: Data Unavailable   No LMP for male patient.  Allergies reviewed: No  Medications reviewed: No    Medications: Medication refills not needed today.  Pharm[facility] into [id number] [address] AT [facility] OF [address]    Clinical concerns: Feeling fairly well, has met with Urology and pt is hesitant to do surgery with s/e and also his comorbidities. He is worried about hormone therapy with his cardiac history and is leaning towards RT or just watchful-waiting.  Dr. Young was notified.    Radiation " Therapy Patient Education    Person involved with teaching: Patient and Wife    Patient educational needs for self management of treatment-related side effects assessment completed.  EPIC Patient Ed tab contains Patient Learning Assessment    Education Materials Given  Radiation Therapy and You, Understanding Radiation Therapy, Radiation Therapy Team, Radiation Therapy Treatment, Radiation Therapy: Managing Short-Term Side Effects, Skin Care During Radiation Therapy, Radiation Therapy: Your Daily Life, Full Bladder Instructions, Radiation Therapy to the Male Pelvis Guidelines, Oncology Supportive Care Services , Welcome Letter, Insurance PA Information and Map [employer] [facility]    Educational Topics Discussed  Side effects expected and Nutrition and weight loss    Response To Teaching  Verbalizes understanding    GYN Only  Vaginal Dilator-given and educated: N/A    Referrals sent: None    Chemotherapy?  No        Nae Escalera, RN                  Again, thank you for allowing me to participate in the care of your patient.        Sincerely,        Lacey Young MD     Donor Site Anesthesia Type: same as repair anesthesia

## 2023-06-13 NOTE — PROGRESS NOTES
"Oncology Rooming Note    No prior RT  No ICD    June 13, 2023 11:35 AM   Loy Reid is a 72 year old male who presents for:    Chief Complaint   Patient presents with     Oncology Clinic Visit     Prostate Cancer     Rad Onc consult     Initial Vitals: BP (!) 157/70   Pulse 53   Resp 18   SpO2 96%  Estimated body mass index is 29.01 kg/m  as calculated from the following:    Height as of 6/8/23: 1.715 m (5' 7.5\").    Weight as of 6/8/23: 85.3 kg (188 lb). There is no height or weight on file to calculate BSA.  No Pain (0) Comment: Data Unavailable   No LMP for male patient.  Allergies reviewed: No  Medications reviewed: No    Medications: Medication refills not needed today.  Pharmacy name entered into [facility]: [id number] [address] AT [facility] [address]    Clinical concerns: Feeling fairly well, has met with Urology and pt is hesitant to do surgery with s/e and also his comorbidities. He is worried about hormone therapy with his cardiac history and is leaning towards RT or just watchful-waiting.  Dr. Young was notified.    Radiation Therapy Patient Education    Person involved with teaching: Patient and Wife    Patient educational needs for self management of treatment-related side effects assessment completed.  [facility] Patient Ed tab contains Patient Learning Assessment    Education Materials Given  Radiation Therapy and You, Understanding Radiation Therapy, Radiation Therapy Team, Radiation Therapy Treatment, Radiation Therapy: Managing Short-Term Side Effects, Skin Care During Radiation Therapy, Radiation Therapy: Your Daily Life, Full Bladder Instructions, Radiation Therapy to the Male Pelvis Guidelines, Oncology Supportive Care Services , Welcome Letter, Insurance PA Information and Map [facility]    Educational Topics Discussed  Side effects expected and Nutrition and weight loss    Response To Teaching  Verbalizes understanding    GYN " Only  Vaginal Dilator-given and educated: N/A    Referrals sent: None    Chemotherapy?  No        Nae Escalera RN

## 2023-06-27 ENCOUNTER — OFFICE VISIT (OUTPATIENT)
Dept: CARDIOLOGY | Facility: CLINIC | Age: 73
End: 2023-06-27
Attending: STUDENT IN AN ORGANIZED HEALTH CARE EDUCATION/TRAINING PROGRAM
Payer: MEDICARE

## 2023-06-27 VITALS
RESPIRATION RATE: 16 BRPM | HEART RATE: 56 BPM | DIASTOLIC BLOOD PRESSURE: 77 MMHG | OXYGEN SATURATION: 97 % | WEIGHT: 189 LBS | SYSTOLIC BLOOD PRESSURE: 153 MMHG | BODY MASS INDEX: 29.16 KG/M2

## 2023-06-27 DIAGNOSIS — R06.02 SOB (SHORTNESS OF BREATH): Primary | ICD-10-CM

## 2023-06-27 DIAGNOSIS — E78.5 DYSLIPIDEMIA: ICD-10-CM

## 2023-06-27 DIAGNOSIS — I25.10 CORONARY ARTERY DISEASE INVOLVING NATIVE CORONARY ARTERY OF NATIVE HEART, UNSPECIFIED WHETHER ANGINA PRESENT: Chronic | ICD-10-CM

## 2023-06-27 DIAGNOSIS — I10 HYPERTENSION, UNSPECIFIED TYPE: ICD-10-CM

## 2023-06-27 PROCEDURE — 99204 OFFICE O/P NEW MOD 45 MIN: CPT | Performed by: INTERNAL MEDICINE

## 2023-06-27 RX ORDER — ROSUVASTATIN CALCIUM 40 MG/1
40 TABLET, COATED ORAL DAILY
Qty: 90 TABLET | Refills: 3 | Status: SHIPPED | OUTPATIENT
Start: 2023-06-27 | End: 2024-07-08

## 2023-06-27 RX ORDER — LISINOPRIL 5 MG/1
5 TABLET ORAL DAILY
Qty: 90 TABLET | Refills: 3 | Status: SHIPPED | OUTPATIENT
Start: 2023-06-27 | End: 2024-08-20

## 2023-06-27 NOTE — LETTER
6/27/2023    CUATE KING MD  [address] [place] [address]    RE: Loy Reid       Dear Colleague,     I had the pleasure of seeing Loy Reid in the [facility].         [facility] HEART CARE   [address], [place] [address]   [email or web address]   OFFICE: [phone]          Thank you Dr. Lugo for asking the [facility] team to participate in the care of your patient, Loy Reid.     Impression and Plan     1.  Coronary artery disease.  Given he has history of known coronary artery disease having had history of unstable ischemic syndrome with PCI with stent appointment in 2010 (revascularization details unclear).  Parenthetically, he has been maintained on dual antiplatelet therapy with aspirin and clopidogrel ever since his intervention though he had not routinely followed up with a cardiologist.  Given he is well beyond his revascularization procedure, I did indicate the clopidogrel could be discontinued though emphasized the importance of maintaining daily aspirin.  He minimizes actual chest pain, but does have some dyspnea on exertion which may in part be ischemically mediated.  He did have a CT scan of the chest performed 6 January 2023 that revealed pronounced calcification involving the LAD.  Given he continues to intermittently smoke, the fact that he had his intervention over a decade ago, and the aforementioned; would advocate ischemic work-up for further evaluation.  Plan:  Continue 81 mg aspirin.  Continue metoprolol.  Discontinue clopidogrel.  Regadenoson MRI to evaluate for ischemia entheses (Loy states he has had MRI tests in the past and has not had issues with claustrophobia and the like).  Advised full cessation of smoking.    2.  Hypertension.  Pressure has been trending somewhat high.  Normally would consider increasing metoprolol, however, heart rate is in the 50s.  Continue metoprolol succinate 50 mg  daily.  Add lisinopril 5 mg daily in part given the putative favorable endothelial effects of the therapy as well.    3.  Dyslipidemia.  Lipid profile 8 December 2022 revealed LDL 94 mg/dL and HDL 28 mg/dL.  Ideally would like to fully suppress LDL less than 70 mg/dL if possible.  Convert atorvastatin to rosuvastatin 40 mg daily.  Obtain repeat lipid profile in 3 months post change.    Follow-up and further recommendations pending regadenoson MRI results.    35 minutes spent reviewing prior records (including documentation, laboratory studies, cardiac testing/imaging), interview with patient along with physical exam, planning, and subsequent documentation/crafting of note).           History of Present Illness    Once again I would like to thank you again for asking me to participate in the care of your patient, Loy Reid.  As you know, but to reiterate for my own records, Loy Reid is a 72 year old male with known coronary artery disease having had history of unstable ischemic syndrome with PCI with stent appointment in 2010 (revascularization details unclear).    On interview, the Loy minimizes any actual chest pain though does have some dyspnea and shortness of breath with certain activities.  He attributes this primarily to his COPD/emphysema.  He did indicate he had fairly typical chest pain symptoms prior to his revascularization procedure.  He denies palpitations or lightheadedness.    Further review of systems is otherwise negative/noncontributory (medical record and 13 point review of systems reviewed as well and pertinent positives noted).         Cardiac Diagnostics/Imaging      CT scan of chest 6 January 2023:   Couple tiny pulmonary nodules.  Mild airway thickening.  Emphysema.  Coronary artery calcification: Severe pronounced in the LAD.           Physical Examination       BP (!) 153/77 (BP Location: Left arm, Patient Position: Sitting, Cuff Size: Adult Regular)   Pulse 56   Resp 16   Wt  85.7 kg (189 lb)   SpO2 97%   BMI 29.16 kg/m          Wt Readings from Last 3 Encounters:   06/27/23 85.7 kg (189 lb)   06/08/23 85.3 kg (188 lb)   06/06/23 85.8 kg (189 lb 3.2 oz)     The patient is alert and oriented times three. Sclerae are anicteric. Mucosal membranes are moist. Jugular venous pressure is normal. No significant adenopathy/thyromegally appreciated. Lungs are clear with good expansion. On cardiovascular exam, the patient has a regular S1 and S2. Abdomen is soft and non-tender. Extremities reveal no clubbing, cyanosis, or edema.         Family History/Social History/Risk Factors   Patient does woke on and off.  Family history reviewed, and family history includes Cerebrovascular Disease in his father and mother; Coronary Artery Disease in his mother and sister; Heart Disease in his brother and father.          Medical History  Surgical History Family History Social History   Past Medical History:   Diagnosis Date    Myocardial infarction (H)     Prostate cancer (H)     PVD (peripheral vascular disease) (H)     Tobacco use      Past Surgical History:   Procedure Laterality Date    FOOT FRACTURE SURGERY       Family History   Problem Relation Age of Onset    Cerebrovascular Disease Mother     Coronary Artery Disease Mother     Cerebrovascular Disease Father     Heart Disease Father     Coronary Artery Disease Sister     Heart Disease Brother         Social History     Socioeconomic History    Marital status:      Spouse name: Not on file    Number of children: Not on file    Years of education: Not on file    Highest education level: Not on file   Occupational History    Not on file   Tobacco Use    Smoking status: Some Days     Types: Cigarettes     Start date: 05/2022    Smokeless tobacco: Never    Tobacco comments:     Has 1 cigarette every so often   Substance and Sexual Activity    Alcohol use: Not on file    Drug use: Not on file    Sexual activity: Not on file   Other Topics Concern     Not on file   Social History Narrative    Not on file     Social Determinants of Health     Financial Resource Strain: Not on file   Food Insecurity: Not on file   Transportation Needs: Not on file   Physical Activity: Not on file   Stress: Not on file   Social Connections: Not on file   Intimate Partner Violence: Not on file   Housing Stability: Not on file           Medications  Allergies   Current Outpatient Medications   Medication Sig Dispense Refill    albuterol (PROAIR HFA/PROVENTIL HFA/VENTOLIN HFA) 108 (90 Base) MCG/ACT inhaler Inhale 2 puffs into the lungs every 6 hours as needed for shortness of breath, wheezing or cough 18 g 3    aspirin (ASA) 325 MG tablet Take 1 tablet (325 mg) by mouth daily 90 tablet 3    atorvastatin (LIPITOR) 80 MG tablet Take 1 tablet (80 mg) by mouth daily 90 tablet 3    clopidogrel (PLAVIX) 75 MG tablet Take 1 tablet (75 mg) by mouth daily 90 tablet 3    levothyroxine (SYNTHROID/LEVOTHROID) 88 MCG tablet Take 1 tablet (88 mcg) by mouth daily 90 tablet 3    metoprolol succinate ER (TOPROL XL) 50 MG 24 hr tablet Take 1 tablet (50 mg) by mouth daily 90 tablet 3    nitroGLYcerin (NITROSTAT) 0.3 MG sublingual tablet Place 1 tablet (0.3 mg) under the tongue every 5 minutes as needed for chest pain 20 tablet 3     No Known Allergies       Lab Results    Chemistry/lipid CBC Cardiac Enzymes/BNP/TSH/INR   Recent Labs   Lab Test 12/08/22  1047   CHOL 146   HDL 28*   LDL 94   TRIG 119     Recent Labs   Lab Test 12/08/22  1047 11/26/19  0848 09/20/17  1104   LDL 94 91 107     Recent Labs   Lab Test 05/31/23  1151 12/08/22  1047   NA  --  141   POTASSIUM  --  4.6   CHLORIDE  --  101   CO2  --  26   GLC  --  109*   BUN  --  22.8   CR 1.4* 1.38*   GFRESTIMATED 54* 55*   CAROL  --  9.1     Recent Labs   Lab Test 05/31/23  1151 12/08/22  1047 11/26/19  0848   CR 1.4* 1.38* 1.26     Recent Labs   Lab Test 12/08/22  1047   A1C 6.4*          Recent Labs   Lab Test 12/08/22  1047   WBC 10.2   HGB  15.1   HCT 47.1   MCV 86        Recent Labs   Lab Test 12/08/22  1047 11/26/19  0848   HGB 15.1 13.8*    No results for input(s): TROPONINI in the last 62765 hours.  No results for input(s): BNP, NTBNPI, NTBNP in the last 33702 hours.  Recent Labs   Lab Test 06/08/23  1106   TSH 25.20*     No results for input(s): INR in the last 07489 hours.       Medications  Allergies   Current Outpatient Medications   Medication Sig Dispense Refill    albuterol (PROAIR HFA/PROVENTIL HFA/VENTOLIN HFA) 108 (90 Base) MCG/ACT inhaler Inhale 2 puffs into the lungs every 6 hours as needed for shortness of breath, wheezing or cough 18 g 3    aspirin (ASA) 325 MG tablet Take 1 tablet (325 mg) by mouth daily 90 tablet 3    atorvastatin (LIPITOR) 80 MG tablet Take 1 tablet (80 mg) by mouth daily 90 tablet 3    clopidogrel (PLAVIX) 75 MG tablet Take 1 tablet (75 mg) by mouth daily 90 tablet 3    levothyroxine (SYNTHROID/LEVOTHROID) 88 MCG tablet Take 1 tablet (88 mcg) by mouth daily 90 tablet 3    metoprolol succinate ER (TOPROL XL) 50 MG 24 hr tablet Take 1 tablet (50 mg) by mouth daily 90 tablet 3    nitroGLYcerin (NITROSTAT) 0.3 MG sublingual tablet Place 1 tablet (0.3 mg) under the tongue every 5 minutes as needed for chest pain 20 tablet 3      No Known Allergies       Lab Results   Lab Results   Component Value Date     12/08/2022    CO2 26 12/08/2022    CO2 27 11/26/2019    BUN 22.8 12/08/2022    BUN 22 11/26/2019     Lab Results   Component Value Date    WBC 10.2 12/08/2022    HGB 15.1 12/08/2022    HCT 47.1 12/08/2022    MCV 86 12/08/2022     12/08/2022     Lab Results   Component Value Date    CHOL 146 12/08/2022    TRIG 119 12/08/2022    HDL 28 12/08/2022     Lab Results   Component Value Date    TSH 25.20 06/08/2023                    Thank you for allowing me to participate in the care of your patient.      Sincerely,     Linda Ochoa MD     [facility]  [facility]  cc:   uBrak Lugo MD  [address] [place] [id number]

## 2023-06-27 NOTE — PROGRESS NOTES
[facility] HEART CARE   [address], [place] [address]   [email or web address]   OFFICE: [phone]          Thank you Dr. Lugo for asking the [facility] team to participate in the care of your patient, Loy Reid.     Impression and Plan     1.  Coronary artery disease.  Given he has history of known coronary artery disease having had history of unstable ischemic syndrome with PCI with stent appointment in 2010 (revascularization details unclear).  Parenthetically, he has been maintained on dual antiplatelet therapy with aspirin and clopidogrel ever since his intervention though he had not routinely followed up with a cardiologist.  Given he is well beyond his revascularization procedure, I did indicate the clopidogrel could be discontinued though emphasized the importance of maintaining daily aspirin.  He minimizes actual chest pain, but does have some dyspnea on exertion which may in part be ischemically mediated.  He did have a CT scan of the chest performed 6 January 2023 that revealed pronounced calcification involving the LAD.  Given he continues to intermittently smoke, the fact that he had his intervention over a decade ago, and the aforementioned; would advocate ischemic work-up for further evaluation.  Plan:    Continue 81 mg aspirin.    Continue metoprolol.    Discontinue clopidogrel.    Regadenoson MRI to evaluate for ischemia entheses (Loy states he has had MRI tests in the past and has not had issues with claustrophobia and the like).    Advised full cessation of smoking.    2.  Hypertension.  Pressure has been trending somewhat high.  Normally would consider increasing metoprolol, however, heart rate is in the 50s.    Continue metoprolol succinate 50 mg daily.    Add lisinopril 5 mg daily in part given the putative favorable endothelial effects of the therapy as well.    3.  Dyslipidemia.  Lipid profile 8 December 2022 revealed LDL 94  mg/dL and HDL 28 mg/dL.  Ideally would like to fully suppress LDL less than 70 mg/dL if possible.    Convert atorvastatin to rosuvastatin 40 mg daily.    Obtain repeat lipid profile in 3 months post change.    Follow-up and further recommendations pending regadenoson MRI results.    35 minutes spent reviewing prior records (including documentation, laboratory studies, cardiac testing/imaging), interview with patient along with physical exam, planning, and subsequent documentation/crafting of note).           History of Present Illness    Once again I would like to thank you again for asking me to participate in the care of your patient, Loy Reid.  As you know, but to reiterate for my own records, Loy Reid is a 72 year old male with known coronary artery disease having had history of unstable ischemic syndrome with PCI with stent appointment in 2010 (revascularization details unclear).    On interview, the Loy minimizes any actual chest pain though does have some dyspnea and shortness of breath with certain activities.  He attributes this primarily to his COPD/emphysema.  He did indicate he had fairly typical chest pain symptoms prior to his revascularization procedure.  He denies palpitations or lightheadedness.    Further review of systems is otherwise negative/noncontributory (medical record and 13 point review of systems reviewed as well and pertinent positives noted).         Cardiac Diagnostics/Imaging      CT scan of chest 6 January 2023:   1. Couple tiny pulmonary nodules.  2. Mild airway thickening.  3. Emphysema.  4. Coronary artery calcification: Severe pronounced in the LAD.           Physical Examination       BP (!) 153/77 (BP Location: Left arm, Patient Position: Sitting, Cuff Size: Adult Regular)   Pulse 56   Resp 16   Wt 85.7 kg (189 lb)   SpO2 97%   BMI 29.16 kg/m          Wt Readings from Last 3 Encounters:   06/27/23 85.7 kg (189 lb)   06/08/23 85.3 kg (188 lb)   06/06/23 85.8 kg  (189 lb 3.2 oz)     The patient is alert and oriented times three. Sclerae are anicteric. Mucosal membranes are moist. Jugular venous pressure is normal. No significant adenopathy/thyromegally appreciated. Lungs are clear with good expansion. On cardiovascular exam, the patient has a regular S1 and S2. Abdomen is soft and non-tender. Extremities reveal no clubbing, cyanosis, or edema.         Family History/Social History/Risk Factors   Patient does woke on and off.  Family history reviewed, and family history includes Cerebrovascular Disease in his father and mother; Coronary Artery Disease in his mother and sister; Heart Disease in his brother and father.          Medical History  Surgical History Family History Social History   Past Medical History:   Diagnosis Date     Myocardial infarction (H)      Prostate cancer (H)      PVD (peripheral vascular disease) (H)      Tobacco use      Past Surgical History:   Procedure Laterality Date     FOOT FRACTURE SURGERY       Family History   Problem Relation Age of Onset     Cerebrovascular Disease Mother      Coronary Artery Disease Mother      Cerebrovascular Disease Father      Heart Disease Father      Coronary Artery Disease Sister      Heart Disease Brother         Social History     Socioeconomic History     Marital status:      Spouse name: Not on file     Number of children: Not on file     Years of education: Not on file     Highest education level: Not on file   Occupational History     Not on file   Tobacco Use     Smoking status: Some Days     Types: Cigarettes     Start date: 05/2022     Smokeless tobacco: Never     Tobacco comments:     Has 1 cigarette every so often   Substance and Sexual Activity     Alcohol use: Not on file     Drug use: Not on file     Sexual activity: Not on file   Other Topics Concern     Not on file   Social History Narrative     Not on file     Social Determinants of Health     Financial Resource Strain: Not on file   Food  Insecurity: Not on file   Transportation Needs: Not on file   Physical Activity: Not on file   Stress: Not on file   Social Connections: Not on file   Intimate Partner Violence: Not on file   Housing Stability: Not on file           Medications  Allergies   Current Outpatient Medications   Medication Sig Dispense Refill     albuterol (PROAIR HFA/PROVENTIL HFA/VENTOLIN HFA) 108 (90 Base) MCG/ACT inhaler Inhale 2 puffs into the lungs every 6 hours as needed for shortness of breath, wheezing or cough 18 g 3     aspirin (ASA) 325 MG tablet Take 1 tablet (325 mg) by mouth daily 90 tablet 3     atorvastatin (LIPITOR) 80 MG tablet Take 1 tablet (80 mg) by mouth daily 90 tablet 3     clopidogrel (PLAVIX) 75 MG tablet Take 1 tablet (75 mg) by mouth daily 90 tablet 3     levothyroxine (SYNTHROID/LEVOTHROID) 88 MCG tablet Take 1 tablet (88 mcg) by mouth daily 90 tablet 3     metoprolol succinate ER (TOPROL XL) 50 MG 24 hr tablet Take 1 tablet (50 mg) by mouth daily 90 tablet 3     nitroGLYcerin (NITROSTAT) 0.3 MG sublingual tablet Place 1 tablet (0.3 mg) under the tongue every 5 minutes as needed for chest pain 20 tablet 3     No Known Allergies       Lab Results    Chemistry/lipid CBC Cardiac Enzymes/BNP/TSH/INR   Recent Labs   Lab Test 12/08/22  1047   CHOL 146   HDL 28*   LDL 94   TRIG 119     Recent Labs   Lab Test 12/08/22  1047 11/26/19  0848 09/20/17  1104   LDL 94 91 107     Recent Labs   Lab Test 05/31/23  1151 12/08/22  1047   NA  --  141   POTASSIUM  --  4.6   CHLORIDE  --  101   CO2  --  26   GLC  --  109*   BUN  --  22.8   CR 1.4* 1.38*   GFRESTIMATED 54* 55*   CAROL  --  9.1     Recent Labs   Lab Test 05/31/23  1151 12/08/22  1047 11/26/19  0848   CR 1.4* 1.38* 1.26     Recent Labs   Lab Test 12/08/22  1047   A1C 6.4*          Recent Labs   Lab Test 12/08/22  1047   WBC 10.2   HGB 15.1   HCT 47.1   MCV 86        Recent Labs   Lab Test 12/08/22  1047 11/26/19  0848   HGB 15.1 13.8*    No results for input(s):  TROPONINI in the last 81559 hours.  No results for input(s): BNP, NTBNPI, NTBNP in the last 39114 hours.  Recent Labs   Lab Test 06/08/23  1106   TSH 25.20*     No results for input(s): INR in the last 55514 hours.       Medications  Allergies   Current Outpatient Medications   Medication Sig Dispense Refill     albuterol (PROAIR HFA/PROVENTIL HFA/VENTOLIN HFA) 108 (90 Base) MCG/ACT inhaler Inhale 2 puffs into the lungs every 6 hours as needed for shortness of breath, wheezing or cough 18 g 3     aspirin (ASA) 325 MG tablet Take 1 tablet (325 mg) by mouth daily 90 tablet 3     atorvastatin (LIPITOR) 80 MG tablet Take 1 tablet (80 mg) by mouth daily 90 tablet 3     clopidogrel (PLAVIX) 75 MG tablet Take 1 tablet (75 mg) by mouth daily 90 tablet 3     levothyroxine (SYNTHROID/LEVOTHROID) 88 MCG tablet Take 1 tablet (88 mcg) by mouth daily 90 tablet 3     metoprolol succinate ER (TOPROL XL) 50 MG 24 hr tablet Take 1 tablet (50 mg) by mouth daily 90 tablet 3     nitroGLYcerin (NITROSTAT) 0.3 MG sublingual tablet Place 1 tablet (0.3 mg) under the tongue every 5 minutes as needed for chest pain 20 tablet 3      No Known Allergies       Lab Results   Lab Results   Component Value Date     12/08/2022    CO2 26 12/08/2022    CO2 27 11/26/2019    BUN 22.8 12/08/2022    BUN 22 11/26/2019     Lab Results   Component Value Date    WBC 10.2 12/08/2022    HGB 15.1 12/08/2022    HCT 47.1 12/08/2022    MCV 86 12/08/2022     12/08/2022     Lab Results   Component Value Date    CHOL 146 12/08/2022    TRIG 119 12/08/2022    HDL 28 12/08/2022     Lab Results   Component Value Date    TSH 25.20 06/08/2023

## 2023-07-13 ENCOUNTER — PATIENT OUTREACH (OUTPATIENT)
Dept: ONCOLOGY | Facility: CLINIC | Age: 73
End: 2023-07-13
Payer: MEDICARE

## 2023-07-13 NOTE — PROGRESS NOTES
[facility]: Cancer Care                                                                                          Called Loy to follow up to see if he has given further consideration to treatment decisions. He saw Dr. Lugo on 6/6 in follow up and saw Dr. Young for radiation consult on 6/13. He understands that he is not an ideal surgical candidate but would like to pursue radiation treatment. He does not want hormone treatment and has discussed this with Dr. Young.at his consult. He understands that he will need a CT SIM/treatment planning prior to treatment start. Message to be sent to Dr. Young, [facility] Rad nurse and rad scheduling pool about helping him get set up for treatment.    Signature:  Lona Fry RN

## 2023-07-14 ENCOUNTER — TELEPHONE (OUTPATIENT)
Dept: RADIATION ONCOLOGY | Facility: HOSPITAL | Age: 73
End: 2023-07-14
Payer: MEDICARE

## 2023-07-14 NOTE — TELEPHONE ENCOUNTER
----- Message from Chelsey Mack sent at 7/14/2023 11:03 AM CDT -----  Regarding: RE: treatment decision  Will reach out to patient to schedule follow up  Thank you   Chelesy  ----- Message -----  From: Lacey Young MD  Sent: 7/14/2023  10:38 AM CDT  To: Chelsey Mack; Lona Fry, RN; #  Subject: RE: treatment decision                           The patient needs a follow-up visit first and possibly schedule markers placement before radiation therapy.  Thanks. Lacey    ----- Message -----  From: Chelsey Mack  Sent: 7/13/2023   5:44 PM CDT  To: Lacey Young MD; Lona Fry, RN; #  Subject: RE: treatment decision                           Dr Young   I am not seeing simulation orders. Would you like to do a follow up prior to ordering. Or will you enter and we can schedule for consent and SIM the same day..  Thanks Chelsey  ----- Message -----  From: Lona Fry, RN  Sent: 7/13/2023   3:21 PM CDT  To: Lacey Young MD; [facility] Rad Onc Support [place]; #  Subject: treatment decision                               Hi,    I called Loy today to follow up on his treatment decision and he would like to move forward with radiation. Can you help him get scheduled?    Thanks,  Lona

## 2023-07-18 ENCOUNTER — OFFICE VISIT (OUTPATIENT)
Dept: RADIATION ONCOLOGY | Facility: CLINIC | Age: 73
End: 2023-07-18
Attending: RADIOLOGY
Payer: MEDICARE

## 2023-07-18 VITALS
DIASTOLIC BLOOD PRESSURE: 72 MMHG | RESPIRATION RATE: 16 BRPM | SYSTOLIC BLOOD PRESSURE: 168 MMHG | TEMPERATURE: 97.8 F | WEIGHT: 187.7 LBS | HEART RATE: 54 BPM | OXYGEN SATURATION: 97 % | BODY MASS INDEX: 28.96 KG/M2

## 2023-07-18 DIAGNOSIS — C61 PROSTATE CANCER (H): Primary | ICD-10-CM

## 2023-07-18 PROCEDURE — 77263 THER RADIOLOGY TX PLNG CPLX: CPT | Performed by: RADIOLOGY

## 2023-07-18 PROCEDURE — 99214 OFFICE O/P EST MOD 30 MIN: CPT | Mod: 25 | Performed by: RADIOLOGY

## 2023-07-18 PROCEDURE — G0463 HOSPITAL OUTPT CLINIC VISIT: HCPCS | Performed by: RADIOLOGY

## 2023-07-18 NOTE — PROGRESS NOTES
[facility] Radiation Oncology Follow Up     Patient: Loy Reid  MRN: [id number]  Date of Service: 07/18/2023       Mr. Reid is a 72 year old male with other medical condition including COPD, coronary artery disease and history of MI with stents placement in 2010.  The patient presented with history of elevation of PSA to 7.20 on 12/8/2022 for which he was a seeking further evaluation.  MRI of pelvics on 3/6/2023 showed 30 g prostate gland with a 21 x 21 mm PI-RADS 5 lesion in the right and the left base and mid gland transitional zone at the 10-1 o'clock position suspicious for malignancy.  There is a long segment capsular abutment indicating moderate suspicious of minimum extraprostatic extension.  There is no evidence of lymph nodes and bone metastasis.  Patient underwent ultrasound-guided needle biopsy on 5/5/2023.  The pathology from the targeted lesion showed prostatic acinar Adenocarcinoma, [place] score 4+3 = 7 involving 3 of 3 cores, representing 30% total core volume.  The remaining 12 cores of biopsy was negative.  Patient underwent staging work-up including bone scan and CT abdomen pelvics which showed no evidence of lymph nodes and systemic metastasis.    The patient was initially seen on 6/13/2023 and different treatment options has been discussed with the patient.  He wished to take some time to think it over and is here today for final  evaluation and discussion of possible radiation therapy.     CHEMOTHERAPY HISTORY: Concurrent Chemotherapy: No     RADIATION THERAPY HISTORY: Prior Radiation: No     IMPLANTED CARDIAC DEVICE: none     Medications were reviewed and are up to date on EPIC.    The following portions of the patient's history were reviewed and updated as appropriate: allergies, current medications, past family history, past medical history, past social history, past surgical history and problem list.    Review of Systems:      General  Constitutional  Constitutional (WDL):  Exceptions to WDL  Fatigue: Fatigue relieved by rest  EENT  Eye Disorders  Eye Disorder (WDL): All eye disorder elements are within defined limits (wears glasses)  Ear Disorders  Ear Disorder (WDL): All ear disorder elements are within defined limits  Respiratory  Respiratory  Respiratory (WDL): Exceptions to WDL  Cough: Mild symptoms OR nonprescription intervention indicated  Cardiovascular  Cardiovascular  Cardiovascular (WDL): All cardiovascular elements are within defined limits (Hx of MI, no pacemaker)  Gastrointestinal  Gastrointestinal  Gastrointestinal (WDL): All gastrointestinal elements are within defined limits  Musculoskeletal  Musculoskeletal and Connective Tissue Disorders  Musculoskeletal & Connective (WDL): All musculoskeletal & connective elements are within defined limits  Integumentary  Integumentary  Integumentary (WDL): All integumentary elements are within defined limits  Neurological  Neurosensory  Neurosensory (WDL): All neurosensory elements are within defined limits  Genitourinary/Reproductive  Genitourinary  Genitourinary (WDL): All genitourinary elements are within defined limits  Lymphatic  Lymph System Disorders  Lymph (WDL): All lymph elements are within defined limits  Pain  Pain Score: No Pain (0)  AUA Assessment                                                              Accompanied by  Accompanied By: spouse     Impression     Unfavorable intermediate risk prostate cancer, clinical stage T2 a N0 M0, [place] grade 4+3 = 7 involving 3/15 cores, and a pretherapy PSA of 7.20.  Staging work-up including bone scan, CT abdomen pelvics and MRI scan showed no evidence of lymph nodes and bone metastasis.     Assessment & Plan:     I have personally reviewed his upcoming medical record today.  I have also discussed with the patient about all the possible treatment options for the prostate cancer including clinical observation, hormonal therapy, surgery, cryotherapy, and radiation therapy in  the form of radioactive seed implant and SBRT/IMRT/IGRT/TomoTherapy.  The NCCN guideline for prostate cancer treatment recommendation has also been reviewed with the patient.  The possible risks and the side effects of radiation therapy have been discussed with the patient in detail and at a great length. Given the status of T2a disease, [place] grade 7 and a pretherapy PSA of 7.2, I think the patient has approximately 39-51% chance of organ-confined disease, 33-45% chance to have capsular penetration, 7-16% risk of seminal vesicle involvement and 2-8% risk of lymph node metastasis based on Dr. Price's table.  Given the evidence of [place] 7 disease and the good health status, I think it is reasonable to consider some form of definitive therapy.  The pros and the cons of different treatment options have also been discussed with the patient in detail and at great length.  He seems to understand it well.  After long discussion, the patient elected to proceed with definitive external beam radiation therapy only as his treatment choice for prostate cancer being aware of potential risks and side effects involved.  Patient wished not to consider Lupron therapy due to possible side effects.  We will schedule patient to have fiducial markers placement in the near future.  We will schedule patient return to radiation oncology for simulation 1 week after.  I plan to give radiation therapy to a total dose of 7020 cGy in 26 treatments targeted to the prostate gland only.    I spent approximately 30 minutes today with the patient and 80% time was used for counseling.        Lacey Young MD  Department of Radiation Oncology   [facility]  Tel: [phone]  Page: [phone]    [facility]  [address] [place] [address]     [place] [id number]  [place] [address]    CC:  Patient Care Team:  López Barahona MD as PCP - General  López Barahona MD as Assigned PCP  Burak Lugo  MD Peter as MD (Urology)  Burak Lugo MD as Assigned Surgical Provider  Lacey Young MD as MD (Radiation Oncology)  Linda Ochoa MD as MD (Cardiovascular Disease)  Lacey Young MD as Assigned Cancer Care Provider

## 2023-07-18 NOTE — LETTER
"    7/18/2023         RE: Loy Reid  [address] [address]        Dear Colleague,    Thank you for referring your patient, Loy Reid, to the [facility] RADIATION ONCOLOGY [place]. Please see a copy of my visit note below.    No pacemaker  No prior radiation therapy treatment    Oncology Rooming Note    July 18, 2023 1:42 PM   Loy Reid is a 72 year old male who presents for:    Chief Complaint   Patient presents with     Oncology Clinic Visit     Follow up with Dr. Young     Initial Vitals: BP (!) 168/72 (BP Location: Right arm, Patient Position: Sitting, Cuff Size: Adult Regular)   Pulse 54   Temp 97.8  F (36.6  C) (Oral)   Resp 16   Wt 85.1 kg (187 lb 11.2 oz)   SpO2 97%   BMI 28.96 kg/m   Estimated body mass index is 28.96 kg/m  as calculated from the following:    Height as of 6/8/23: 1.715 m (5' 7.5\").    Weight as of this encounter: 85.1 kg (187 lb 11.2 oz). Body surface area is 2.01 meters squared.  No Pain (0) Comment: Data Unavailable   No LMP for male patient.  Allergies reviewed: Yes  Medications reviewed: Yes    Medications: Medication refills not needed today.  Pharmacy name entered into [employer]: [id number] - [place], [place] - [address] AT [facility] OF [address]    Clinical concerns: Patient here ambulatory accompanied by spouse for follow-up on his prostate cancer.  Patient has decided to treat his cancer with radiation therapy.  Patient has written information from prior consult.  Seen by Dr. Young.  Plan return to clinic for follow-up as directed by provider. Dr. Young was notified.      Mary Renteria RN                [facility] Radiation Oncology Follow Up     Patient: Loy Reid  MRN: [id number]  Date of Service: 07/18/2023       Mr. Reid is a 72 year old male with other medical condition including COPD, coronary artery disease and history of MI with stents placement in 2010.  The patient presented " with history of elevation of PSA to 7.20 on 12/8/2022 for which he was a seeking further evaluation.  MRI of pelvics on 3/6/2023 showed 30 g prostate gland with a 21 x 21 mm PI-RADS 5 lesion in the right and the left base and mid gland transitional zone at the 10-1 o'clock position suspicious for malignancy.  There is a long segment capsular abutment indicating moderate suspicious of minimum extraprostatic extension.  There is no evidence of lymph nodes and bone metastasis.  Patient underwent ultrasound-guided needle biopsy on 5/5/2023.  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  The remaining 12 cores of biopsy was negative.  Patient underwent staging work-up including bone scan and CT abdomen pelvics which showed no evidence of lymph nodes and systemic metastasis.    The patient was initially seen on 6/13/2023 and different treatment options has been discussed with the patient.  He wished to take some time to think it over and is here today for final  evaluation and discussion of possible radiation therapy.     CHEMOTHERAPY HISTORY: Concurrent Chemotherapy: No     RADIATION THERAPY HISTORY: Prior Radiation: No     IMPLANTED CARDIAC DEVICE: none     Medications were reviewed and are up to date on EPIC.    The following portions of the patient's history were reviewed and updated as appropriate: allergies, current medications, past family history, past medical history, past social history, past surgical history and problem list.    Review of Systems:      General  Constitutional  Constitutional (WDL): Exceptions to WDL  Fatigue: Fatigue relieved by rest  EENT  Eye Disorders  Eye Disorder (WDL): All eye disorder elements are within defined limits (wears glasses)  Ear Disorders  Ear Disorder (WDL): All ear disorder elements are within defined limits  Respiratory  Respiratory  Respiratory (WDL):  Exceptions to WDL  Cough: Mild symptoms OR nonprescription intervention indicated  Cardiovascular  Cardiovascular  Cardiovascular (WDL): All cardiovascular elements are within defined limits (Hx of MI, no pacemaker)  Gastrointestinal  Gastrointestinal  Gastrointestinal (WDL): All gastrointestinal elements are within defined limits  Musculoskeletal  Musculoskeletal and Connective Tissue Disorders  Musculoskeletal & Connective (WDL): All musculoskeletal & connective elements are within defined limits  Integumentary  Integumentary  Integumentary (WDL): All integumentary elements are within defined limits  Neurological  Neurosensory  Neurosensory (WDL): All neurosensory elements are within defined limits  Genitourinary/Reproductive  Genitourinary  Genitourinary (WDL): All genitourinary elements are within defined limits  Lymphatic  Lymph System Disorders  Lymph (WDL): All lymph elements are within defined limits  Pain  Pain Score: No Pain (0)  AUA Assessment                                                              Accompanied by  Accompanied By: spouse     Impression     Unfavorable intermediate risk prostate cancer, clinical stage T2 a N0 M0, [place] grade 4+3 = 7 involving 3/15 cores, and a pretherapy PSA of 7.20.  Staging work-up including bone scan, CT abdomen pelvics and MRI scan showed no evidence of lymph nodes and bone metastasis.     Assessment & Plan:     I have personally reviewed his upcoming medical record today.  I have also discussed with the patient about all the possible treatment options for the prostate cancer including clinical observation, hormonal therapy, surgery, cryotherapy, and radiation therapy in the form of radioactive seed implant and SBRT/IMRT/IGRT/TomoTherapy.  The NCCN guideline for prostate cancer treatment recommendation has also been reviewed with the patient.  The possible risks and the side effects of radiation therapy have been discussed with the patient in detail and at a  great length. Given the status of T2a disease, [place] grade 7 and a pretherapy PSA of 7.2, I think the patient has approximately 39-51% chance of organ-confined disease, 33-45% chance to have capsular penetration, 7-16% risk of seminal vesicle involvement and 2-8% risk of lymph node metastasis based on Dr. Price's table.  Given the evidence of [place] 7 disease and the good health status, I think it is reasonable to consider some form of definitive therapy.  The pros and the cons of different treatment options have also been discussed with the patient in detail and at great length.  He seems to understand it well.  After long discussion, the patient elected to proceed with definitive external beam radiation therapy only as his treatment choice for prostate cancer being aware of potential risks and side effects involved.  Patient wished not to consider Lupron therapy due to possible side effects.  We will schedule patient to have fiducial markers placement in the near future.  We will schedule patient return to radiation oncology for simulation 1 week after.  I plan to give radiation therapy to a total dose of 7020 cGy in 26 treatments targeted to the prostate gland only.    I spent approximately 30 minutes today with the patient and 80% time was used for counseling.        Lacey Young MD  Department of Radiation Oncology   [facility]  Tel: [phone]  Page: [phone]    [facility]  [address] [place] [address]     [address] [place] [address]    CC:  Patient Care Team:  López Barahona MD as PCP - General  López Barahona MD as Assigned PCP  Burak Lugo MD as MD (Urology)  Burak Lugo MD as Assigned Surgical Provider  Lacey Young MD as MD (Radiation Oncology)  Linda Ochoa MD as MD (Cardiovascular Disease)  Lacey Young MD as Assigned Cancer Care Provider      Again, thank you for allowing me to participate in the care  of your patient.        Sincerely,        Lacey Young MD

## 2023-07-18 NOTE — PROGRESS NOTES
"No pacemaker  No prior radiation therapy treatment    Oncology Rooming Note    July 18, 2023 1:42 PM   Loy Reid is a 72 year old male who presents for:    Chief Complaint   Patient presents with     Oncology Clinic Visit     Follow up with Dr. Young     Initial Vitals: BP (!) 168/72 (BP Location: Right arm, Patient Position: Sitting, Cuff Size: Adult Regular)   Pulse 54   Temp 97.8  F (36.6  C) (Oral)   Resp 16   Wt 85.1 kg (187 lb 11.2 oz)   SpO2 97%   BMI 28.96 kg/m   Estimated body mass index is 28.96 kg/m  as calculated from the following:    Height as of 6/8/23: 1.715 m (5' 7.5\").    Weight as of this encounter: 85.1 kg (187 lb 11.2 oz). Body surface area is 2.01 meters squared.  No Pain (0) Comment: Data Unavailable   No LMP for male patient.  Allergies reviewed: Yes  Medications reviewed: Yes    Medications: Medication refills not needed today.  [facility] into [id number] [address] AT [facility] [address]    Clinical concerns: Patient here ambulatory accompanied by spouse for follow-up on his prostate cancer.  Patient has decided to treat his cancer with radiation therapy.  Patient has written information from prior consult.  Seen by Dr. Young.  Plan return to clinic for follow-up as directed by provider. Dr. Young was notified.      Mary Renteria RN              "

## 2023-08-04 ENCOUNTER — PATIENT OUTREACH (OUTPATIENT)
Dept: ONCOLOGY | Facility: CLINIC | Age: 73
End: 2023-08-04
Payer: MEDICARE

## 2023-08-10 ENCOUNTER — PATIENT OUTREACH (OUTPATIENT)
Dept: ONCOLOGY | Facility: CLINIC | Age: 73
End: 2023-08-10
Payer: MEDICARE

## 2023-08-10 DIAGNOSIS — C61 PROSTATE CANCER (H): Primary | ICD-10-CM

## 2023-08-10 RX ORDER — CIPROFLOXACIN 500 MG/1
500 TABLET, FILM COATED ORAL 2 TIMES DAILY
Qty: 6 TABLET | Refills: 0 | Status: SHIPPED | OUTPATIENT
Start: 2023-08-10 | End: 2023-08-13

## 2023-08-10 NOTE — PROGRESS NOTES
-Called Loy to follow up regarding scheduled fiducial placement on 8/29/23. Requested that he arrive at 1;15 p.m. for his procedure and confirmed the location. Reviewed medications and allergies. He recently saw a cardiologist and is confused about medications that he was directed to take.stop. He had been on plavix but this has been discontinued on his medication list but  thinks that he may still be taking. He thought there was discussion about a ne medication that may be described to replace this one, Did direct him to talk with his cardiologist to clarify his medications as he would need to hold blood thinning meds prior to his procedure.  Confirmed  his preferred pharmacy in [facility] and relayed  that a prescription for Cipro will be sent for him to start taking starting the day prior to his procedure.  Instructed  to take 1 tab twice daily starting the day prior to the procedure, day of procedure and the following day.. Rationale explained for antibiotic and IM injection day of procedure.Education on enema given.Copy of instructions below were reviewed in full and will be sent to  My Chart Contact information will be given for his future reference.    Ultrasound Guided Fiducial                            Placement    What is a fiducial Placement?   Your urologist may place small markers (usually 3-4) at the outer edge of your prostate to help target the prostate if you have been diagnosed with prostate cancer and you will be having external beam radiation.    How does this process work?  Your urologist will perform an ultrasound (an image created by sound waves) of your prostate. The ultrasound is performed by placing a small probe in the rectum to image the prostate gland. They will use the image to determine the best areas to place he fiducial markers. A marker is placed using a long needle which is guided to the specific area that the marker needs to be placed.    Preparation for the Fiducial Marker Placement      Blood thinning medications must be stopped prior to your procedure  Please stop the following medication the appropriate number of days before:  10 days-acetylsalicylic acid, vitamin E, fish oil, aspirin, baby aspirin  7 days- Plavix, Brilinta, ibuprofen (Advil, Motrin, Aleve)  5 days-Pradaxa  4 days-Coumadin/warfarin  3 days-Eliquis, Xarelto    2.  If you have any bleeding problems (thin blood), please tell your doctor.    3.  If you have been told by another doctor to take antibiotics before dental work or surgery, please tell the doctor.  This is common for patients that have had a joint replacement.    YOU HAVE BEEN PRESCRIBED AN ANTIBIOTIC.  You will start this medication the day before your procedure. It is one pill, twice a day.  You will continue taking the medication until it is gone.    The day of the procedure you will be asked to use an enema one hour before you leave for your appointment.    PLEASE EAT YOUR REGULAR MEALS ON THE DAY OF YOUR PROCEDURE       Your Fiducial Marker placement is scheduled on 8/29/23 at our [place] office.  Please be at the office at 1:15 p.m..

## 2023-08-28 ENCOUNTER — PATIENT OUTREACH (OUTPATIENT)
Dept: ONCOLOGY | Facility: CLINIC | Age: 73
End: 2023-08-28
Payer: MEDICARE

## 2023-08-28 NOTE — PROGRESS NOTES
[facility]: Cancer Care                                                                                          Called Loy and ERICK that writer was calling to follow up prior to his procedure on 8/29, check in at 115. Relayed to review his prep that we had discussed on 8/10 and that was sent to him via My Chart and reminded him to start antibx today as directed. Nurse triage number provided if any questions arise.      Signature:  Lona Fry RN

## 2023-08-29 ENCOUNTER — OFFICE VISIT (OUTPATIENT)
Dept: ONCOLOGY | Facility: CLINIC | Age: 73
End: 2023-08-29
Attending: STUDENT IN AN ORGANIZED HEALTH CARE EDUCATION/TRAINING PROGRAM
Payer: MEDICARE

## 2023-08-29 VITALS
TEMPERATURE: 97.4 F | RESPIRATION RATE: 18 BRPM | WEIGHT: 184.4 LBS | HEART RATE: 58 BPM | DIASTOLIC BLOOD PRESSURE: 72 MMHG | SYSTOLIC BLOOD PRESSURE: 157 MMHG | BODY MASS INDEX: 28.45 KG/M2 | OXYGEN SATURATION: 96 %

## 2023-08-29 DIAGNOSIS — C61 PROSTATE CANCER (H): Primary | ICD-10-CM

## 2023-08-29 PROCEDURE — 64450 NJX AA&/STRD OTHER PN/BRANCH: CPT | Performed by: STUDENT IN AN ORGANIZED HEALTH CARE EDUCATION/TRAINING PROGRAM

## 2023-08-29 PROCEDURE — A4648 IMPLANTABLE TISSUE MARKER: HCPCS | Performed by: STUDENT IN AN ORGANIZED HEALTH CARE EDUCATION/TRAINING PROGRAM

## 2023-08-29 PROCEDURE — 76872 US TRANSRECTAL: CPT

## 2023-08-29 PROCEDURE — 96372 THER/PROPH/DIAG INJ SC/IM: CPT | Performed by: STUDENT IN AN ORGANIZED HEALTH CARE EDUCATION/TRAINING PROGRAM

## 2023-08-29 PROCEDURE — 250N000011 HC RX IP 250 OP 636: Performed by: STUDENT IN AN ORGANIZED HEALTH CARE EDUCATION/TRAINING PROGRAM

## 2023-08-29 PROCEDURE — 55876 PLACE RT DEVICE/MARKER PROS: CPT | Performed by: STUDENT IN AN ORGANIZED HEALTH CARE EDUCATION/TRAINING PROGRAM

## 2023-08-29 PROCEDURE — 76942 ECHO GUIDE FOR BIOPSY: CPT

## 2023-08-29 PROCEDURE — 250N000009 HC RX 250: Performed by: STUDENT IN AN ORGANIZED HEALTH CARE EDUCATION/TRAINING PROGRAM

## 2023-08-29 PROCEDURE — 55876 PLACE RT DEVICE/MARKER PROS: CPT

## 2023-08-29 PROCEDURE — 76942 ECHO GUIDE FOR BIOPSY: CPT | Mod: 26 | Performed by: STUDENT IN AN ORGANIZED HEALTH CARE EDUCATION/TRAINING PROGRAM

## 2023-08-29 RX ORDER — LIDOCAINE HYDROCHLORIDE 10 MG/ML
20 INJECTION, SOLUTION INFILTRATION; PERINEURAL ONCE
Status: COMPLETED | OUTPATIENT
Start: 2023-08-29 | End: 2023-08-29

## 2023-08-29 RX ORDER — LIDOCAINE HYDROCHLORIDE 10 MG/ML
20 INJECTION, SOLUTION EPIDURAL; INFILTRATION; INTRACAUDAL; PERINEURAL ONCE
Status: DISCONTINUED | OUTPATIENT
Start: 2023-08-29 | End: 2023-08-29

## 2023-08-29 RX ORDER — GENTAMICIN 40 MG/ML
80 INJECTION, SOLUTION INTRAMUSCULAR; INTRAVENOUS ONCE
Status: COMPLETED | OUTPATIENT
Start: 2023-08-29 | End: 2023-08-29

## 2023-08-29 RX ORDER — GENTAMICIN 40 MG/ML
80 INJECTION, SOLUTION INTRAMUSCULAR; INTRAVENOUS ONCE
Status: DISCONTINUED | OUTPATIENT
Start: 2023-08-29 | End: 2023-08-29

## 2023-08-29 RX ADMIN — GENTAMICIN SULFATE 80 MG: 40 INJECTION, SOLUTION INTRAMUSCULAR; INTRAVENOUS at 13:48

## 2023-08-29 RX ADMIN — LIDOCAINE HYDROCHLORIDE 20 ML: 10 INJECTION, SOLUTION INFILTRATION; PERINEURAL at 13:56

## 2023-08-29 ASSESSMENT — PAIN SCALES - GENERAL
PAINLEVEL: NO PAIN (0)
PAINLEVEL: NO PAIN (0)

## 2023-08-29 NOTE — LETTER
8/29/2023         RE: Loy Reid  [address] [facility] [address]        Dear Colleague,    Thank you for referring your patient, Loy Reid, to the [facility]. Please see a copy of my visit note below.    Procedure was explained to patient prior to performing Ultrasound Guided Fiducial Marker Placement  The patient signed the consent form and all questions were answered prior to the procedure. Any pre-procedural antibiotics were given according to the performing physicians recommendation.   Consent read and signed: Yes   No Known Allergies  Performing Physician: Dr. Lugo  Antibiotic taken?  Yes, started 8/28 with two doses and took one dose on 8/29, will continue as prescribed  Aspirin or other blood thinning medications discontinued 7-10 days:  yes, stopped 7 days ago  Time of Fleet's enema:  yes, completed at 1200  Patient given Gentamicin intramuscular injection 30 minutes prior to procedure Yes. Given by Deena Mroalez    3 Fiducial markers were placed in prostate,    Fiducials: [employer] kV Fiducial markers, Lot [id number]  exp 1/27/2028     REPROCEDURE DIAGNOSIS: Prostate cancer    POSTPROCEDURE DIAGNOSIS: Prostate cancer    PROCEDURE: Transrectal ultrasound sizing and transrectal ultrasound guided placement of fiducials for Loy Reid who is a 73 year old year old year old male.     SURGEON: Burak Lugo  ANESTHESIA: 10 mL of 1% periprostatic block bilaterally     DESCRIPTION OF PROCEDURE: The procedure, the outcome, the anesthesia, and the risks were discussed with the patient. Informed consent was obtained and signed and a timeout was completed prior to the procedure. Digital rectal examination was performed with the below findings noted. Anesthesia was administered as noted above and the transrectal ultrasound probe was inserted, sizing was performed, and the below findings were noted.  3 fiducials were placed 2 on the right 1 on the  left.  FINDINGS: Digital rectal exam reveals enlarged normal prostate.  Hypoechoic lesions noted bilaterally. US images were obtained.  We then placed a 3 fiducials 2 on the right side and the base and apex and 1 on the left side in the mid gland    PLAN: Please follow-up with radiation oncology for the planned radiation therapy    Burak Lugo MD  Urology  [facility]      Again, thank you for allowing me to participate in the care of your patient.        Sincerely,        Burak Lugo MD

## 2023-08-29 NOTE — PROGRESS NOTES
Procedure was explained to patient prior to performing Ultrasound Guided Fiducial Marker Placement  The patient signed the consent form and all questions were answered prior to the procedure. Any pre-procedural antibiotics were given according to the performing physicians recommendation.   Consent read and signed: Yes   No Known Allergies  Performing Physician: Dr. Lugo  Antibiotic taken?  Yes, started 8/28 with two doses and took one dose on 8/29, will continue as prescribed  Aspirin or other blood thinning medications discontinued 7-10 days:  yes, stopped 7 days ago  Time of Fleet's enema:  yes, completed at 1200  Patient given Gentamicin intramuscular injection 30 minutes prior to procedure Yes. Given by Deena Moralez    3 Fiducial markers were placed in prostate,    Fiducials: [employer] kV Fiducial markers, Lot [id number]  exp 1/27/2028

## 2023-08-29 NOTE — PATIENT INSTRUCTIONS
[facility] Urology  Transrectal Ultrasound Guided Fiducial Marker Placement    The physician who performed your Transrectal Ultrasound  Fiducial Placement (telephone number [phone], 8-5 Monday thru Friday, [phone] after hours).  Please contact provider if you have any problems or questions after your procedure. If  you are unable to reach your doctor, or the provider on call,  please go to the nearest Emergency Department.    There are four conditions that you should watch for after your fiducial placement:    Excessive pain  Bleeding irregularities (passing numerous  dime sized  clots or if your urine looks like cranberry juice)  Fever of 100 degrees or more  If you are unable to urinate    If you run a fever above 100 degrees, call the Urology Clinic immediately.  If you are unable to reach your physician or the provider on call, go to the nearest emergency room.  Explain that you have had a transrectal biopsy of your prostate and what problems you are experiencing.      You should attempt to urinate following your procedure before you leave the clinic.  If you are unable to urinate four (4) to six (6) hours after you leave the clinic, you will need to contact the Urology Clinic.  If you are unable to reach your physician or the provider on call, go to the nearest emergency room.    You may experience mild perineal (groin area) discomfort after the procedure.You may take acetaminophen (Tylenol) for pain.  If  you experience any discomfort  that becomes severe or uncontrolled by medication, contact the Urology Clinic. If unable to reach your physician or the provider on call , please go to the nearest emergency room.        DO NOT TAKE aspirin or ibuprofen products (Motrin, Advil, Nuprin, ect.) for ONE week after your fiducial placment,  unless specified differently by your physician.     If you stopped anticoagulant therapy prior to  your fiducial placement ask your provider when you can  restart.    Take one antibiotic the evening of the procedure. Take one antibiotic the morning and evening on the day following your fiducial placement    Drink at least 6-8 glasses of fluids for the first 48 hours.    Avoid heavy lifting and strenuous activity for 48 hours.    Avoid sexual intercourse for the first 24 hours. You make have blood in your semen for 4 weeks after the procedure, up to about a dozen ejaculations. The blood in your ejaculate may appear as brown streaks, blood tinged, and immediately following a biopsy, it may appear bright red.      Do not be alarmed if you see blood in your stool. You may notice a small amount of blood on the tissue after a bowel movement.    You may pass blood with clots in your urine following the procedure. The amount will decrease with time but may be visible for up to two weeks.

## 2023-08-29 NOTE — PROGRESS NOTES
REPROCEDURE DIAGNOSIS: Prostate cancer    POSTPROCEDURE DIAGNOSIS: Prostate cancer    PROCEDURE: Transrectal ultrasound sizing and transrectal ultrasound guided placement of fiducials for Loy Reid who is a 73 year old year old year old male.     SURGEON: Burak Lugo  ANESTHESIA: 10 mL of 1% periprostatic block bilaterally     DESCRIPTION OF PROCEDURE: The procedure, the outcome, the anesthesia, and the risks were discussed with the patient. Informed consent was obtained and signed and a timeout was completed prior to the procedure. Digital rectal examination was performed with the below findings noted. Anesthesia was administered as noted above and the transrectal ultrasound probe was inserted, sizing was performed, and the below findings were noted.  3 fiducials were placed 2 on the right 1 on the left.  FINDINGS: Digital rectal exam reveals enlarged normal prostate.  Hypoechoic lesions noted bilaterally. US images were obtained.  We then placed a 3 fiducials 2 on the right side and the base and apex and 1 on the left side in the mid gland    PLAN: Please follow-up with radiation oncology for the planned radiation therapy    Burak Lugo MD  Urology  [facility]

## 2023-08-31 ENCOUNTER — HOSPITAL ENCOUNTER (OUTPATIENT)
Dept: MRI IMAGING | Facility: HOSPITAL | Age: 73
Discharge: HOME | End: 2023-08-31
Attending: INTERNAL MEDICINE
Payer: MEDICARE

## 2023-08-31 VITALS — OXYGEN SATURATION: 94 % | HEART RATE: 71 BPM | DIASTOLIC BLOOD PRESSURE: 81 MMHG | SYSTOLIC BLOOD PRESSURE: 140 MMHG

## 2023-08-31 DIAGNOSIS — I25.10 CORONARY ARTERY DISEASE INVOLVING NATIVE CORONARY ARTERY OF NATIVE HEART, UNSPECIFIED WHETHER ANGINA PRESENT: Chronic | ICD-10-CM

## 2023-08-31 DIAGNOSIS — R06.02 SOB (SHORTNESS OF BREATH): ICD-10-CM

## 2023-08-31 DIAGNOSIS — I10 HYPERTENSION, UNSPECIFIED TYPE: ICD-10-CM

## 2023-08-31 DIAGNOSIS — I25.10 CORONARY ARTERY DISEASE INVOLVING NATIVE CORONARY ARTERY: Primary | Chronic | ICD-10-CM

## 2023-08-31 LAB
ATRIAL RATE - MUSE: 69 BPM
ATRIAL RATE - MUSE: 69 BPM
DIASTOLIC BLOOD PRESSURE - MUSE: NORMAL MMHG
DIASTOLIC BLOOD PRESSURE - MUSE: NORMAL MMHG
INTERPRETATION ECG - MUSE: NORMAL
INTERPRETATION ECG - MUSE: NORMAL
P AXIS - MUSE: 71 DEGREES
P AXIS - MUSE: 71 DEGREES
PR INTERVAL - MUSE: 136 MS
PR INTERVAL - MUSE: 152 MS
QRS DURATION - MUSE: 134 MS
QRS DURATION - MUSE: 136 MS
QT - MUSE: 380 MS
QT - MUSE: 408 MS
QTC - MUSE: 437 MS
QTC - MUSE: 504 MS
R AXIS - MUSE: 72 DEGREES
R AXIS - MUSE: 78 DEGREES
SYSTOLIC BLOOD PRESSURE - MUSE: NORMAL MMHG
SYSTOLIC BLOOD PRESSURE - MUSE: NORMAL MMHG
T AXIS - MUSE: -14 DEGREES
T AXIS - MUSE: -21 DEGREES
VENTRICULAR RATE- MUSE: 106 BPM
VENTRICULAR RATE- MUSE: 69 BPM

## 2023-08-31 PROCEDURE — G1010 CDSM STANSON: HCPCS

## 2023-08-31 PROCEDURE — G1010 CDSM STANSON: HCPCS | Performed by: INTERNAL MEDICINE

## 2023-08-31 PROCEDURE — 255N000002 HC RX 255 OP 636: Mod: JZ | Performed by: INTERNAL MEDICINE

## 2023-08-31 PROCEDURE — 75563 CARD MRI W/STRESS IMG & DYE: CPT | Mod: 26 | Performed by: INTERNAL MEDICINE

## 2023-08-31 PROCEDURE — A9585 GADOBUTROL INJECTION: HCPCS | Mod: JZ | Performed by: INTERNAL MEDICINE

## 2023-08-31 PROCEDURE — 93018 CV STRESS TEST I&R ONLY: CPT | Performed by: INTERNAL MEDICINE

## 2023-08-31 PROCEDURE — 93010 ELECTROCARDIOGRAM REPORT: CPT | Mod: HOP | Performed by: INTERNAL MEDICINE

## 2023-08-31 PROCEDURE — 93005 ELECTROCARDIOGRAM TRACING: CPT

## 2023-08-31 PROCEDURE — 250N000011 HC RX IP 250 OP 636: Performed by: INTERNAL MEDICINE

## 2023-08-31 PROCEDURE — 999N000122 MR MYOCARDIUM  OVERREAD

## 2023-08-31 PROCEDURE — 93016 CV STRESS TEST SUPVJ ONLY: CPT | Performed by: INTERNAL MEDICINE

## 2023-08-31 RX ORDER — REGADENOSON 0.08 MG/ML
0.4 INJECTION, SOLUTION INTRAVENOUS ONCE
Status: COMPLETED | OUTPATIENT
Start: 2023-08-31 | End: 2023-08-31

## 2023-08-31 RX ORDER — GADOBUTROL 604.72 MG/ML
20 INJECTION INTRAVENOUS ONCE
Status: COMPLETED | OUTPATIENT
Start: 2023-08-31 | End: 2023-08-31

## 2023-08-31 RX ORDER — GADOBUTROL 604.72 MG/ML
0.1 INJECTION INTRAVENOUS ONCE
Status: DISCONTINUED | OUTPATIENT
Start: 2023-08-31 | End: 2023-08-31

## 2023-08-31 RX ORDER — AMINOPHYLLINE 25 MG/ML
50 INJECTION, SOLUTION INTRAVENOUS
Status: DISCONTINUED | OUTPATIENT
Start: 2023-08-31 | End: 2023-08-31 | Stop reason: HOSPADM

## 2023-08-31 RX ORDER — GADOBUTROL 604.72 MG/ML
0.1 INJECTION INTRAVENOUS ONCE
Status: CANCELLED | OUTPATIENT
Start: 2023-08-31 | End: 2023-08-31

## 2023-08-31 RX ADMIN — REGADENOSON 0.4 MG: 0.08 INJECTION, SOLUTION INTRAVENOUS at 09:37

## 2023-08-31 RX ADMIN — GADOBUTROL 20 ML: 604.72 INJECTION INTRAVENOUS at 09:48

## 2023-09-05 ENCOUNTER — MEDICAL CORRESPONDENCE (OUTPATIENT)
Dept: CARDIOLOGY | Facility: CLINIC | Age: 73
End: 2023-09-05
Payer: MEDICARE

## 2023-09-06 DIAGNOSIS — Z01.812 PRE-PROCEDURE LAB EXAM: ICD-10-CM

## 2023-09-06 DIAGNOSIS — R06.02 SOB (SHORTNESS OF BREATH): ICD-10-CM

## 2023-09-06 DIAGNOSIS — R94.39 ABNORMAL CARDIOVASCULAR STRESS TEST: Primary | ICD-10-CM

## 2023-09-07 ENCOUNTER — ALLIED HEALTH/NURSE VISIT (OUTPATIENT)
Dept: RADIATION ONCOLOGY | Facility: HOSPITAL | Age: 73
End: 2023-09-07
Attending: RADIOLOGY
Payer: MEDICARE

## 2023-09-07 ENCOUNTER — TELEPHONE (OUTPATIENT)
Dept: CARDIOLOGY | Facility: CLINIC | Age: 73
End: 2023-09-07

## 2023-09-07 LAB
ABO/RH(D): NORMAL
ANTIBODY SCREEN: NEGATIVE
SPECIMEN EXPIRATION DATE: NORMAL

## 2023-09-07 NOTE — TELEPHONE ENCOUNTER
Loy Reid  [address] [facility] [address]  [phone] (home)     PCP:  López Barahona  H&P completed by:  9/8 López Barahona MD   Admit date 9/13/23 Arrival time:  06:30  Anticoagulation:  NA  Previous PCI: Yes 2010 [facility]  Bypass Grafts: No  Renal Issues: No  Diabetic?: No  Device?: No    Ambulation status: independent    Reason for Visit:  Telephone call to discuss pre-procedure education in preparation for: Coronary Angiogram with Possible PCI    Procedure Prep:  EKG results obtained, dated: To be completed on day of cath lab procedure  Hemogram results obtained: To be completed on day of cath lab procedure  Basic Metabolic Panel results obtained: To be completed on day of cath lab procedure  Pertinent cardiac test results: Cardiac MRI 8/31/23      Patient Education    Your arrival time is 06:30.  [place] [address] - Main Entrance of the Hospital  Please plan on being at the hospital all day.  At any time, emergencies and/or urgent cases may come up which could delay the start of your procedure.      Pre-procedure instructions  Take your temperature in the morning prior to coming in.  If your temperature is 100 F please call [facility] [phone] and notify them.  If you do not have access to a thermometer at home, please come in for testing.  If you are running a temperature your procedure may be rescheduled.  Patient instructed to not Eat or Drink after midnight.  Patient instructed to shower the evening before or the morning of the procedure.  Patient instructed to arrange for transportation home following procedure from a responsible family member or friend. No driving for at least 24 hours.  Patient instructed to have a responsible adult with them for 24 hours post-procedure.  Post-procedure follow up process.  Conscious sedation discussed.      Pre-procedure medication instructions.  Continue  medications as scheduled, with a small amount of water on the day of the procedure unless indicated. (NO Diabetic Medications or Blood Thinners)  Pt instructed not to consume Alcohol, Tobacco, Caffeine, or Carbonated beverages 12 hours prior to procedure.  Patient instructed to take 325 mg of Aspirin the night before and morning of procedure: Yes  Other medication: instructed to only take levothyroxine, lisinopril, metoprolol a.m. of the procedure.    Patient states understanding of procedure and agrees to proceed.    *PATIENTS RECORDS AVAILABLE IN [facility] UNLESS OTHERWISE INDICATED*      Patient Active Problem List   Diagnosis    Coronary artery disease involving native coronary artery    History of coronary artery stent placement    Mixed hyperlipidemia    Essential hypertension    Tobacco abuse    COPD (chronic obstructive pulmonary disease) (H)    Class 1 obesity due to excess calories without serious comorbidity with body mass index (BMI) of 31.0 to 31.9 in adult    Other specified hypothyroidism    Prostate cancer (H)    Hypothyroidism due to acquired atrophy of thyroid       Current Outpatient Medications   Medication Sig Dispense Refill    albuterol (PROAIR HFA/PROVENTIL HFA/VENTOLIN HFA) 108 (90 Base) MCG/ACT inhaler Inhale 2 puffs into the lungs every 6 hours as needed for shortness of breath, wheezing or cough 18 g 3    aspirin (ASA) 325 MG tablet Take 1 tablet (325 mg) by mouth daily (Patient not taking: Reported on 8/29/2023) 90 tablet 3    levothyroxine (SYNTHROID/LEVOTHROID) 88 MCG tablet Take 1 tablet (88 mcg) by mouth daily 90 tablet 3    lisinopril (ZESTRIL) 5 MG tablet Take 1 tablet (5 mg) by mouth daily 90 tablet 3    metoprolol succinate ER (TOPROL XL) 50 MG 24 hr tablet Take 1 tablet (50 mg) by mouth daily 90 tablet 3    nitroGLYcerin (NITROSTAT) 0.3 MG sublingual tablet Place 1 tablet (0.3 mg) under the tongue every 5 minutes as needed for chest pain 20 tablet 3    rosuvastatin (CRESTOR) 40 MG  tablet Take 1 tablet (40 mg) by mouth daily (Patient not taking: Reported on 7/18/2023) 90 tablet 3       No Known Allergies    DEANGELO VARELA RN on 9/7/2023 at 2:36 PM

## 2023-09-07 NOTE — PROCEDURES
SIMULATION NOTE:    DIAGNOSIS:  Unfavorable intermediate risk prostate cancer, clinical stage T2 a N0 M0, [place] grade 4+3 = 7 involving 3/15 cores, and a pretherapy PSA of 7.20.  Staging work-up including bone scan, CT abdomen pelvics and MRI scan showed no evidence of lymph nodes and bone metastasis.      INDICATION:  After discussing with the patient about various treatment options, the patient elected to proceed with external beam radiation therapy only as his treatment of choice for his prostate cancer.    CONSENT:  The possible risks and side effects of radiation therapy have been discussed with the patient in detail and at a great length.  Questions are answered to patient's satisfaction.  The written consent was obtained.    SIMULATION:  The patient is in a supine position with a headrest and a vac loc between the bilateral lower extremities to help keep the same position during the daily radiation therapy.  Tentative isocenter is set up in the center of the pelvic region.  We will acquire CT information to help us to better locate the target and design the radiation therapy field.    BLOCKS:  Custom blocks will be drawn to minimize radiation to normal tissues and to protect normal organs including, but not limited to, urinary bladder, rectum, small bowels, bone and soft tissue.    DOSAGE:  I plan to give him radiation therapy to a total of 7020 cGy in 26 treatments targeted to the prostate gland only.  I will consider to use IMRT/IGRT technique to help us to better locate the target and to protect normal tissues.      Lacey Young MD, PhD  Department of Radiation Oncology   [facility] Radiation Oncology  Tel: [phone]  Page: [phone]    [facility]  [address] [place] [address]     [address] [place] [address]

## 2023-09-07 NOTE — TELEPHONE ENCOUNTER
----- Message from Heidi Gutierrez sent at 9/7/2023  2:10 PM CDT -----  Regarding: RE: CA P.PCI  Case type: CA Poss PCI  Procedure Physician(s): YUMIKO  Procedure Date and Patient Arrival Time: Tuesday 9/13, with arrival time of 0630  H&P: Pt scheduled on 9/8 w/ PMD  Pre-Procedure Lab Appt: on admission - Please place lab orders if you haven't already!  Alerts/Important Info: None  Interpretor Requested: N/A    Thank you,  Heidi        ----- Message -----  From: Alesia Villeda RN  Sent: 9/6/2023  11:01 AM CDT  To: [facility] Procedure  Pool - [facility]  Subject: CA P.PCI                                         Case Type: CA P.PCI  Ordering Provider: Dr. Ochoa  H&P: needed  Alerts: none  Additional Info/Urgency: Patient will call to schedule  Orders for Pre-Procedure Labs? 9/6/23

## 2023-09-07 NOTE — TELEPHONE ENCOUNTER
----- Message from Heidi Gutierrez sent at 9/7/2023  2:10 PM CDT -----  Regarding: RE: CA P.PCI  Case type: CA Poss PCI  Procedure Physician(s): YUMIKO  Procedure Date and Patient Arrival Time: Tuesday 9/13, with arrival time of 0630  H&P: Pt scheduled on 9/8 w/ PMD  Pre-Procedure Lab Appt: on admission - Please place lab orders if you haven't already!  Alerts/Important Info: None  Interpretor Requested: N/A    Thank you,  Heidi        ----- Message -----  From: Alesia Villeda RN  Sent: 9/6/2023  11:01 AM CDT  To: [facility] Procedure  Pool - Lhe  Subject: CA P.PCI                                         Case Type: CA P.PCI  Ordering Provider: Dr. Ochoa  H&P: needed  Alerts: none  Additional Info/Urgency: Patient will call to schedule  Orders for Pre-Procedure Labs? 9/6/23

## 2023-09-07 NOTE — PROCEDURES
Clinical Treatment Planning Note    The complex radiotherapy planning will be completed for the patient for his prostate cancer.  The patient had a planning CT earlier today for planning.  The treatment aids were used for planning, including vac-loc immobilization.  The therapy planning is necessary to reduce radiotherapy dose to the normal critical structures which is not possible with simple treatment planning.  In addition, dose to the target and the critical structures will require three-dimensional analysis of the isodose distribution.  This planning will be done to reduce dose to rectum, small bowel, and the urinary bladder, penile bulb and femoral heads.     I will contour the clinical tumor volume  CTV , with extended volume of planning treatment volume  PTV  on the treatment planning system.  The critical structures will be outlined, including urinary to rectum, small bowel, and urinary bladder, penile bulb, femoral heads, bones, and soft tissue.     The treatment planning will be done on the computer treatment planning system.  I will consider to use multiple gantry positions to achieve optimal PTV coverage.  The dose distribution to the above critical structures will be reviewed.  The isodose distribution along the X, Y, Z plan will be also reviewed.  Custom blocking will be used to shield normal structures.  The beam's eye view will be reviewed and the digital reconstructed imaging will be reviewed on the planning software.  The IMRT/IGRT technique will be used to deliver optimal dose to the tumor and to protect normal tissue.  The patient will receive a total dose of 7020 cGy in 26 treatments targeted to the prostate gland using 6-10 MV photon.       Lacey Young MD, PhD  Department of Radiation Oncology   [facility] Radiation Oncology  Tel: [phone]  Page: [phone]    [facility]  [address] [place] [address]     [address] [place] [address]

## 2023-09-08 ENCOUNTER — OFFICE VISIT (OUTPATIENT)
Dept: INTERNAL MEDICINE | Facility: CLINIC | Age: 73
End: 2023-09-08
Payer: MEDICARE

## 2023-09-08 ENCOUNTER — PREP FOR PROCEDURE (OUTPATIENT)
Dept: CARDIOLOGY | Facility: CLINIC | Age: 73
End: 2023-09-08

## 2023-09-08 VITALS
OXYGEN SATURATION: 96 % | RESPIRATION RATE: 16 BRPM | HEART RATE: 56 BPM | SYSTOLIC BLOOD PRESSURE: 126 MMHG | HEIGHT: 68 IN | DIASTOLIC BLOOD PRESSURE: 56 MMHG | TEMPERATURE: 97.9 F | BODY MASS INDEX: 27.43 KG/M2 | WEIGHT: 181 LBS

## 2023-09-08 DIAGNOSIS — E03.4 HYPOTHYROIDISM DUE TO ACQUIRED ATROPHY OF THYROID: ICD-10-CM

## 2023-09-08 DIAGNOSIS — I25.10 CORONARY ARTERY DISEASE DUE TO LIPID RICH PLAQUE: Primary | ICD-10-CM

## 2023-09-08 DIAGNOSIS — R06.02 SOB (SHORTNESS OF BREATH): ICD-10-CM

## 2023-09-08 DIAGNOSIS — Z01.812 PRE-PROCEDURE LAB EXAM: ICD-10-CM

## 2023-09-08 DIAGNOSIS — I25.83 CORONARY ARTERY DISEASE DUE TO LIPID RICH PLAQUE: Primary | ICD-10-CM

## 2023-09-08 DIAGNOSIS — R94.39 ABNORMAL CARDIOVASCULAR STRESS TEST: ICD-10-CM

## 2023-09-08 DIAGNOSIS — R94.39 ABNORMAL CARDIOVASCULAR STRESS TEST: Primary | ICD-10-CM

## 2023-09-08 LAB
ANION GAP SERPL CALCULATED.3IONS-SCNC: 11 MMOL/L (ref 7–15)
BUN SERPL-MCNC: 13.5 MG/DL (ref 8–23)
CALCIUM SERPL-MCNC: 9.3 MG/DL (ref 8.8–10.2)
CHLORIDE SERPL-SCNC: 103 MMOL/L (ref 98–107)
CREAT SERPL-MCNC: 1.24 MG/DL (ref 0.67–1.17)
DEPRECATED HCO3 PLAS-SCNC: 25 MMOL/L (ref 22–29)
EGFRCR SERPLBLD CKD-EPI 2021: 62 ML/MIN/1.73M2
ERYTHROCYTE [DISTWIDTH] IN BLOOD BY AUTOMATED COUNT: 15 % (ref 10–15)
GLUCOSE SERPL-MCNC: 119 MG/DL (ref 70–99)
HCT VFR BLD AUTO: 44.8 % (ref 40–53)
HGB BLD-MCNC: 14.4 G/DL (ref 13.3–17.7)
MCH RBC QN AUTO: 27.5 PG (ref 26.5–33)
MCHC RBC AUTO-ENTMCNC: 32.1 G/DL (ref 31.5–36.5)
MCV RBC AUTO: 86 FL (ref 78–100)
PLATELET # BLD AUTO: 259 10E3/UL (ref 150–450)
POTASSIUM SERPL-SCNC: 4.9 MMOL/L (ref 3.4–5.3)
RBC # BLD AUTO: 5.23 10E6/UL (ref 4.4–5.9)
SODIUM SERPL-SCNC: 139 MMOL/L (ref 136–145)
TSH SERPL DL<=0.005 MIU/L-ACNC: 32.7 UIU/ML (ref 0.3–4.2)
WBC # BLD AUTO: 10 10E3/UL (ref 4–11)

## 2023-09-08 PROCEDURE — 85027 COMPLETE CBC AUTOMATED: CPT | Performed by: INTERNAL MEDICINE

## 2023-09-08 PROCEDURE — 86901 BLOOD TYPING SEROLOGIC RH(D): CPT | Performed by: INTERNAL MEDICINE

## 2023-09-08 PROCEDURE — 84443 ASSAY THYROID STIM HORMONE: CPT | Performed by: INTERNAL MEDICINE

## 2023-09-08 PROCEDURE — 36415 COLL VENOUS BLD VENIPUNCTURE: CPT | Performed by: INTERNAL MEDICINE

## 2023-09-08 PROCEDURE — G0008 ADMIN INFLUENZA VIRUS VAC: HCPCS | Performed by: INTERNAL MEDICINE

## 2023-09-08 PROCEDURE — 99214 OFFICE O/P EST MOD 30 MIN: CPT | Mod: 25 | Performed by: INTERNAL MEDICINE

## 2023-09-08 PROCEDURE — 90662 IIV NO PRSV INCREASED AG IM: CPT | Performed by: INTERNAL MEDICINE

## 2023-09-08 PROCEDURE — 80048 BASIC METABOLIC PNL TOTAL CA: CPT | Performed by: INTERNAL MEDICINE

## 2023-09-08 PROCEDURE — 86900 BLOOD TYPING SEROLOGIC ABO: CPT | Performed by: INTERNAL MEDICINE

## 2023-09-08 PROCEDURE — 86850 RBC ANTIBODY SCREEN: CPT | Performed by: INTERNAL MEDICINE

## 2023-09-08 RX ORDER — ASPIRIN 81 MG/1
243 TABLET, CHEWABLE ORAL ONCE
Status: CANCELLED | OUTPATIENT
Start: 2023-09-13

## 2023-09-08 RX ORDER — LIDOCAINE 40 MG/G
CREAM TOPICAL
Status: CANCELLED | OUTPATIENT
Start: 2023-09-08

## 2023-09-08 RX ORDER — ASPIRIN 325 MG
325 TABLET ORAL ONCE
Status: CANCELLED | OUTPATIENT
Start: 2023-09-13 | End: 2023-09-08

## 2023-09-08 RX ORDER — FENTANYL CITRATE 50 UG/ML
25 INJECTION, SOLUTION INTRAMUSCULAR; INTRAVENOUS
Status: CANCELLED | OUTPATIENT
Start: 2023-09-13

## 2023-09-08 RX ORDER — LEVOTHYROXINE SODIUM 112 UG/1
112 TABLET ORAL DAILY
Qty: 90 TABLET | Refills: 3 | Status: SHIPPED | OUTPATIENT
Start: 2023-09-08

## 2023-09-08 RX ORDER — ASPIRIN 81 MG/1
81 TABLET ORAL DAILY
COMMUNITY

## 2023-09-08 RX ORDER — SODIUM CHLORIDE 9 MG/ML
INJECTION, SOLUTION INTRAVENOUS CONTINUOUS
Status: CANCELLED | OUTPATIENT
Start: 2023-09-13

## 2023-09-08 NOTE — PATIENT INSTRUCTIONS
Follow-up multiple issues and  I AM GIVING HOSSEIN MEDICAL CLEARANCE TO PROCEED WITH CORONARY ANGIOGRAPHY AND POSSIBLE PERCUTANEOUS REVASCULARIZATION, which is scheduled for next week on September 13, 2023, for indication of suspicion for severe coronary artery disease, Hossein has little in the way of anginal symptoms (reports chest pain that might occur once a month), but his cardiac MRI shows signs of previous nontransmural myocardial infarction in the inferolateral wall    Hosseni is going to swing by the laboratory this morning so that he can get the testing that was already ordered by cardiology Dr. Ochoa (basic metabolic panel, CBC, lipid panel), and Hossein will also get another TSH checked to monitor levothyroxine therapy (Hossein's TSH was still very elevated in June 2023, at which time I increased his levothyroxine from 75 to 88 mcg a day, which he is taking)    Retired teacher     Still smoking--I urged Hossein to stop the cigarettes right now, because if he does get coronary revascularization, recovery from that requires that he be smoke-free    Hossein told me that he is taking the high intensity rosuvastatin 40 mg a day that was prescribed by Dr. Ochoa  I told Hossein to resume the aspirin therapy 81 mg a day today September 8, 2023, since he has already had the prostate radiation therapy markers placed.    Hossein told me that it was very physically uncomfortable when he had femoral arterial sheath in place back in 2010, and is hopeful that the upcoming angiography and intervention could be done through the upper extremities instead.    8-  .  Pharmacological Regadenoson stress cardiac MRI is positive for inducible myocardial ischemia in  inferolateral wall.  2.  Pharmacological Regadenoson stress ECG is nondiagnostic due to baseline ECG abnormality.  3.  Normal left ventricular size, mild LVH and mild hypokinesis in inferolateral wall.             The calculated left  ventricular ejection fraction is  53%.  4.  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 Rest myocardium is viable.  5.  Normal right ventricular size and systolic function.  6.  No obvious valvular disease.  History of coronary artery stents placement, 2010  Heavy coronary artery calcifications were seen on his lung CT scan   Has no symptoms of angina or heart failure.    I told Loy that he does not need to pursue any cardiac testing as long as he remains asymptomatic and is already on maximal medical therapy, with aggressive control of lipids, and dual antiplatelet medication, and on blood pressure medication with good control  The one thing he could add is to stop smoking     aspirin 81 MG tablet  Rosuvastatin 40 mg daily    Localized prostate cancer  Her plan is to commence at the end of September 2023 with definitive external beam radiation therapy  Loy wishes not to consider Lupron therapy due to possible side effects.     fiducial markers placement  plan to give radiation therapy to a total dose of 7020 cGy in 26 treatments targeted to the prostate gland only.   8-  Transrectal ultrasound sizing and transrectal ultrasound guided placement of fiducials     5-  EXAM: CT ABDOMEN PELVIS W CONTRAST  LOCATION: [facility]  DATE/TIME: 5/31/2023 12:00 PM CDT  VASCULATURE: Calcific atheromatous change in the abdominal aorta. No aneurysm.                               IMPRESSION:   1.  Prostatic enlargement and inhomogeneity. Urinary bladder wall thickening likely due to outlet obstruction.  2.  Colonic diverticula without CT evidence for diverticulitis.     3-1-2023 MRI  IMPRESSION:  1. Based on the most suspicious abnormality, this exam is characterized as PIRADS 5 - Clinically significant cancer is highly likely to be present.  The most suspicious abnormality is located at  the right and left anterior  transition zone at the base and mid gland and there is long segment capsular abutment indicating moderate suspicion of minimal extraprostatic extension.  2. No suspicious adenopathy or evidence of pelvic metastases.    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  50 yrs, about 1 ppd     1-6-2023  IMPRESSION:  1.  Couple tiny pulmonary nodules.  2.  Severe coronary calcifications.  3.  Mild airway thickening.  4.  Emphysema  RADIOLOGIST RECOMMENDATION: Continue annual screening with low-dose CT chest in 12 months.    COPD, Chronic bronchitis, with emphysema seen on CT scan  Used to take Spiriva  Experiences Dyspnea on exertion walking half a block. Gets respiratory infection about 1 a year     I filled out a handicap parking application for him, since he must stop to rest after walking less than 200 feet because of COPD     History of shingles that affected his right forehead and scalp, near the eye, but did not have ocular involvement when [facility], January 2021   I told Loy that shingles vaccination could be considered, which she would get at a community pharmacy, since it is paid under the Medicare prescription drug benefit.     Mixed hyperlipidemia  Rosuvastatin 40 mg daily  12-8-2022  Direct Measure HDL >=40 mg/dL 28 Low       LDL Cholesterol Calculated <=100 mg/dL 94       Triglycerides <150 mg/dL 119       Essential hypertension  metoprolol succinate (TOPROL-XL) 50 MG 24 hr tablet  Lisinopril 5 mg daily    Target blood pressure is under 130 systolic when measured at rest in the seated position on the right upper arm.  BP Readings from Last 6 Encounters:   09/08/23 126/56   08/31/23 (!)  140/81   08/29/23 (!) 157/72   07/18/23 (!) 168/72   06/27/23 (!) 153/77   06/13/23 (!) 157/70     Obesity body mass index 31.  Would like to see him lose about 15 pounds in the first 2023  He told with alcohol consumption is minimal.  He might eat in a restaurant once a month.  I reminded him about the importance of eating slowly, controlling portion size, and identifying problem foods to curtail or limit such starches, sweets, and fried foods.   Wt Readings from Last 5 Encounters:   09/08/23 82.1 kg (181 lb)   08/29/23 83.6 kg (184 lb 6.4 oz)   07/18/23 85.1 kg (187 lb 11.2 oz)   06/27/23 85.7 kg (189 lb)   06/08/23 85.3 kg (188 lb)     Special screening for malignant neoplasms, colon  12-  COLOGUARD-ABSTRACT Negative      COVID-19 Vaccine Bivalent Booster 18+ (Moderna) 12/08/2022      We will give Loy a seasonal flu shot today September 8, 2023

## 2023-09-08 NOTE — PROGRESS NOTES
Office Visit - Follow Up   Hossein Reid   73 year old male    Date of Visit: 9/8/2023    Chief Complaint   Patient presents with    Hypertension        -------------------------------------------------------------------------------------------------------------------------  Assessment and Plan    ADDENDUM:  TSH still very elevated at 32.7.  Will increase from 88 to 112 mcg.  Will send a [employer] message.  Check TSH again 2-3 months.    Follow-up multiple issues and  I AM GIVING HOSSEIN MEDICAL CLEARANCE TO PROCEED WITH CORONARY ANGIOGRAPHY AND POSSIBLE PERCUTANEOUS REVASCULARIZATION, which is scheduled for next week on September 13, 2023, for indication of suspicion for severe coronary artery disease, Hossein has little in the way of anginal symptoms (reports chest pain that might occur once a month), but his cardiac MRI shows signs of previous nontransmural myocardial infarction in the inferolateral wall    Hossein is going to swing by the laboratory this morning so that he can get the testing that was already ordered by cardiology Dr. Ochoa (basic metabolic panel, CBC, lipid panel), and Hossein will also get another TSH checked to monitor levothyroxine therapy (Hossein's TSH was still very elevated in June 2023, at which time I increased his levothyroxine from 75 to 88 mcg a day, which he is taking)    Recent Results (from the past 24 hour(s))   CBC with platelets    Collection Time: 09/08/23 11:08 AM   Result Value Ref Range    WBC Count 10.0 4.0 - 11.0 10e3/uL    RBC Count 5.23 4.40 - 5.90 10e6/uL    Hemoglobin 14.4 13.3 - 17.7 g/dL    Hematocrit 44.8 40.0 - 53.0 %    MCV 86 78 - 100 fL    MCH 27.5 26.5 - 33.0 pg    MCHC 32.1 31.5 - 36.5 g/dL    RDW 15.0 10.0 - 15.0 %    Platelet Count 259 150 - 450 10e3/uL   Basic metabolic panel    Collection Time: 09/08/23 11:08 AM   Result Value Ref Range    Sodium 139 136 - 145 mmol/L    Potassium 4.9 3.4 - 5.3 mmol/L    Chloride 103 98 - 107 mmol/L    Carbon Dioxide (CO2) 25 22 -  29 mmol/L    Anion Gap 11 7 - 15 mmol/L    Urea Nitrogen 13.5 8.0 - 23.0 mg/dL    Creatinine 1.24 (H) 0.67 - 1.17 mg/dL    Calcium 9.3 8.8 - 10.2 mg/dL    Glucose 119 (H) 70 - 99 mg/dL    GFR Estimate 62 >60 mL/min/1.73m2   Adult Type and Screen    Collection Time: 09/08/23 11:08 AM   Result Value Ref Range    ABO/RH(D) A POS     Antibody Screen Negative Negative    SPECIMEN EXPIRATION DATE [id number]         Retired teacher     Still smoking--I urged Loy to stop the cigarettes right now, because if he does get coronary revascularization, recovery from that requires that he be smoke-free    Loy told me that he is taking the high intensity rosuvastatin 40 mg a day that was prescribed by Dr. Ochoa  I told Loy to resume the aspirin therapy 81 mg a day today September 8, 2023, since he has already had the prostate radiation therapy markers placed.    Loy told me that it was very physically uncomfortable when he had femoral arterial sheath in place back in 2010, and is hopeful that the upcoming angiography and intervention could be done through the upper extremities instead.    8-  .  Pharmacological Regadenoson stress cardiac MRI is positive for inducible myocardial ischemia in  inferolateral wall.  2.  Pharmacological Regadenoson stress ECG is nondiagnostic due to baseline ECG abnormality.  3.  Normal left ventricular size, mild LVH and mild hypokinesis in inferolateral wall.             The calculated left  ventricular ejection fraction is 53%.  4.  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 Rest myocardium is viable.  5.  Normal right ventricular size and systolic function.  6.  No obvious valvular disease.  History of coronary artery stents placement, 2010  Heavy coronary artery calcifications were seen on his lung CT scan   Has no symptoms of angina or heart failure.    I told Loy that he  does not need to pursue any cardiac testing as long as he remains asymptomatic and is already on maximal medical therapy, with aggressive control of lipids, and dual antiplatelet medication, and on blood pressure medication with good control  The one thing he could add is to stop smoking     aspirin 81 MG tablet  Rosuvastatin 40 mg daily    Localized prostate cancer  Her plan is to commence at the end of September 2023 with definitive external beam radiation therapy  Loy wishes not to consider Lupron therapy due to possible side effects.     fiducial markers placement  plan to give radiation therapy to a total dose of 7020 cGy in 26 treatments targeted to the prostate gland only.   8-  Transrectal ultrasound sizing and transrectal ultrasound guided placement of fiducials     5-  EXAM: CT ABDOMEN PELVIS W CONTRAST  LOCATION: [facility]  DATE/TIME: 5/31/2023 12:00 PM CDT  VASCULATURE: Calcific atheromatous change in the abdominal aorta. No aneurysm.                               IMPRESSION:   1.  Prostatic enlargement and inhomogeneity. Urinary bladder wall thickening likely due to outlet obstruction.  2.  Colonic diverticula without CT evidence for diverticulitis.     3-1-2023 MRI  IMPRESSION:  1. Based on the most suspicious abnormality, this exam is characterized as PIRADS 5 - Clinically significant cancer is highly likely to be present.  The most suspicious abnormality is located at  the right and left anterior transition zone at the base and mid gland and there is long segment capsular abutment indicating moderate suspicion of minimal extraprostatic extension.  2. No suspicious adenopathy or evidence of pelvic metastases.    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  50 yrs, about 1 ppd     1-6-2023  IMPRESSION:  1.  Couple tiny pulmonary nodules.  2.  Severe coronary calcifications.  3.  Mild airway thickening.  4.  Emphysema  RADIOLOGIST RECOMMENDATION: Continue annual screening with low-dose CT chest in 12 months.    COPD, Chronic bronchitis, with emphysema seen on CT scan  Used to take Spiriva  Experiences Dyspnea on exertion walking half a block. Gets respiratory infection about 1 a year     I filled out a handicap parking application for him, since he must stop to rest after walking less than 200 feet because of COPD     History of shingles that affected his right forehead and scalp, near the eye, but did not have ocular involvement when [facility], [facility] 2021   I told Loy that shingles vaccination could be considered, which she would get at a community pharmacy, since it is paid under the Medicare prescription drug benefit.     Mixed hyperlipidemia  Rosuvastatin 40 mg daily  12-8-2022  Direct Measure HDL >=40 mg/dL 28 Low       LDL Cholesterol Calculated <=100 mg/dL 94       Triglycerides <150 mg/dL 119       Essential hypertension  metoprolol succinate (TOPROL-XL) 50 MG 24 hr tablet  Lisinopril 5 mg daily    Target blood pressure is under 130 systolic when measured at rest in the seated position on the right upper arm.  BP Readings from Last 6 Encounters:   09/08/23 126/56   08/31/23 (!) 140/81   08/29/23 (!) 157/72   07/18/23 (!) 168/72   06/27/23 (!) 153/77   06/13/23 (!) 157/70     Obesity body mass index 31.  Would like to see him lose about 15 pounds in the first 2023  He told with alcohol consumption is minimal.  He might eat in a restaurant once a month.  I reminded him about the importance of eating slowly, controlling portion size, and identifying problem foods to curtail or limit such starches, sweets, and fried foods.   Wt Readings from  Last 5 Encounters:   09/08/23 82.1 kg (181 lb)   08/29/23 83.6 kg (184 lb 6.4 oz)   07/18/23 85.1 kg (187 lb 11.2 oz)   06/27/23 85.7 kg (189 lb)   06/08/23 85.3 kg (188 lb)     Special screening for malignant neoplasms, colon  12-  COLOGUARD-ABSTRACT Negative      COVID-19 Vaccine Bivalent Booster 18+ (Moderna) 12/08/2022      We will give Loy a seasonal flu shot today September 8, 2023       --------------------------------------------------------------------------------------------------------------------------  History of Present Illness  This 73 year old old     CORONARY ANGIOGRAPHY AND POSSIBLE PERCUTANEOUS REVASCULARIZATION, which is scheduled for next week on September 13, 2023, for indication of suspicion for severe coronary artery disease, Loy has little in the way of anginal symptoms (reports chest pain that might occur once a month), but his cardiac MRI shows signs of previous nontransmural myocardial infarction in the inferolateral wall    Wt Readings from Last 3 Encounters:   09/08/23 82.1 kg (181 lb)   08/29/23 83.6 kg (184 lb 6.4 oz)   07/18/23 85.1 kg (187 lb 11.2 oz)     BP Readings from Last 3 Encounters:   09/08/23 126/56   08/31/23 (!) 140/81   08/29/23 (!) 157/72       Review of Systems: A comprehensive review of systems was negative except as noted.  ---------------------------------------------------------------------------------------------------------------------------    Medications, Allergies, Social, and Problem List     Current Outpatient Medications   Medication Sig Dispense Refill    albuterol (PROAIR HFA/PROVENTIL HFA/VENTOLIN HFA) 108 (90 Base) MCG/ACT inhaler Inhale 2 puffs into the lungs every 6 hours as needed for shortness of breath, wheezing or cough 18 g 3    aspirin 81 MG EC tablet Take 81 mg by mouth daily      levothyroxine (SYNTHROID/LEVOTHROID) 88 MCG tablet Take 1 tablet (88 mcg) by mouth daily 90 tablet 3    lisinopril (ZESTRIL) 5 MG tablet Take 1 tablet (5  "mg) by mouth daily 90 tablet 3    metoprolol succinate ER (TOPROL XL) 50 MG 24 hr tablet Take 1 tablet (50 mg) by mouth daily 90 tablet 3    nitroGLYcerin (NITROSTAT) 0.3 MG sublingual tablet Place 1 tablet (0.3 mg) under the tongue every 5 minutes as needed for chest pain 20 tablet 3    rosuvastatin (CRESTOR) 40 MG tablet Take 1 tablet (40 mg) by mouth daily (Patient not taking: Reported on 7/18/2023) 90 tablet 3     No Known Allergies  Social History     Tobacco Use    Smoking status: Some Days     Types: Cigarettes     Start date: 05/2022    Smokeless tobacco: Never    Tobacco comments:     Has 1 cigarette every so often     Patient Active Problem List   Diagnosis    Coronary artery disease involving native coronary artery    History of coronary artery stent placement    Mixed hyperlipidemia    Essential hypertension    Tobacco abuse    COPD (chronic obstructive pulmonary disease) (H)    Class 1 obesity due to excess calories without serious comorbidity with body mass index (BMI) of 31.0 to 31.9 in adult    Other specified hypothyroidism    Prostate cancer (H)    Hypothyroidism due to acquired atrophy of thyroid        Reviewed, reconciled and updated       Physical Exam   General Appearance:       /56 (BP Location: Right arm, Patient Position: Sitting, Cuff Size: Adult Regular)   Pulse 56   Temp 97.9  F (36.6  C)   Resp 16   Ht 1.715 m (5' 7.5\")   Wt 82.1 kg (181 lb)   SpO2 96%   BMI 27.93 kg/m      Loy looks good today, blood pressure quite satisfactory, oxygen saturation 96% acceptable  Oropharynx clear  Neck good range of motion  Lungs clear no wheezes  Heart regular rate rhythm no murmur  Abdomen nontender  Extremities no edema     Additional Information   I spent 30 minutes on this encounter, including reviewing interval history since last visit, examining the patient, explaining and counseling the issues enumerated in the Assessment and Plan (patient given a copy)       CUATE KING MD, " MD

## 2023-09-08 NOTE — H&P (VIEW-ONLY)
Office Visit - Follow Up   Hossein Reid   73 year old male    Date of Visit: 9/8/2023    Chief Complaint   Patient presents with    Hypertension        -------------------------------------------------------------------------------------------------------------------------  Assessment and Plan    ADDENDUM:  TSH still very elevated at 32.7.  Will increase from 88 to 112 mcg.  Will send a [employer] message.  Check TSH again 2-3 months.    Follow-up multiple issues and  I AM GIVING HOSSEIN MEDICAL CLEARANCE TO PROCEED WITH CORONARY ANGIOGRAPHY AND POSSIBLE PERCUTANEOUS REVASCULARIZATION, which is scheduled for next week on September 13, 2023, for indication of suspicion for severe coronary artery disease, Hossein has little in the way of anginal symptoms (reports chest pain that might occur once a month), but his cardiac MRI shows signs of previous nontransmural myocardial infarction in the inferolateral wall    Hossein is going to swing by the laboratory this morning so that he can get the testing that was already ordered by cardiology Dr. Ochoa (basic metabolic panel, CBC, lipid panel), and Hossein will also get another TSH checked to monitor levothyroxine therapy (Hossein's TSH was still very elevated in June 2023, at which time I increased his levothyroxine from 75 to 88 mcg a day, which he is taking)    Recent Results (from the past 24 hour(s))   CBC with platelets    Collection Time: 09/08/23 11:08 AM   Result Value Ref Range    WBC Count 10.0 4.0 - 11.0 10e3/uL    RBC Count 5.23 4.40 - 5.90 10e6/uL    Hemoglobin 14.4 13.3 - 17.7 g/dL    Hematocrit 44.8 40.0 - 53.0 %    MCV 86 78 - 100 fL    MCH 27.5 26.5 - 33.0 pg    MCHC 32.1 31.5 - 36.5 g/dL    RDW 15.0 10.0 - 15.0 %    Platelet Count 259 150 - 450 10e3/uL   Basic metabolic panel    Collection Time: 09/08/23 11:08 AM   Result Value Ref Range    Sodium 139 136 - 145 mmol/L    Potassium 4.9 3.4 - 5.3 mmol/L    Chloride 103 98 - 107 mmol/L    Carbon Dioxide (CO2) 25 22 -  29 mmol/L    Anion Gap 11 7 - 15 mmol/L    Urea Nitrogen 13.5 8.0 - 23.0 mg/dL    Creatinine 1.24 (H) 0.67 - 1.17 mg/dL    Calcium 9.3 8.8 - 10.2 mg/dL    Glucose 119 (H) 70 - 99 mg/dL    GFR Estimate 62 >60 mL/min/1.73m2   Adult Type and Screen    Collection Time: 09/08/23 11:08 AM   Result Value Ref Range    ABO/RH(D) A POS     Antibody Screen Negative Negative    SPECIMEN EXPIRATION DATE [id number]         Retired teacher     Still smoking--I urged Loy to stop the cigarettes right now, because if he does get coronary revascularization, recovery from that requires that he be smoke-free    Loy told me that he is taking the high intensity rosuvastatin 40 mg a day that was prescribed by Dr. Ochoa  I told Loy to resume the aspirin therapy 81 mg a day today September 8, 2023, since he has already had the prostate radiation therapy markers placed.    Loy told me that it was very physically uncomfortable when he had femoral arterial sheath in place back in 2010, and is hopeful that the upcoming angiography and intervention could be done through the upper extremities instead.    8-  .  Pharmacological Regadenoson stress cardiac MRI is positive for inducible myocardial ischemia in  inferolateral wall.  2.  Pharmacological Regadenoson stress ECG is nondiagnostic due to baseline ECG abnormality.  3.  Normal left ventricular size, mild LVH and mild hypokinesis in inferolateral wall.             The calculated left  ventricular ejection fraction is 53%.  4.  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 Rest myocardium is viable.  5.  Normal right ventricular size and systolic function.  6.  No obvious valvular disease.  History of coronary artery stents placement, 2010  Heavy coronary artery calcifications were seen on his lung CT scan   Has no symptoms of angina or heart failure.    I told Loy that he  does not need to pursue any cardiac testing as long as he remains asymptomatic and is already on maximal medical therapy, with aggressive control of lipids, and dual antiplatelet medication, and on blood pressure medication with good control  The one thing he could add is to stop smoking     aspirin 81 MG tablet  Rosuvastatin 40 mg daily    Localized prostate cancer  Her plan is to commence at the end of September 2023 with definitive external beam radiation therapy  Loy wishes not to consider Lupron therapy due to possible side effects.     fiducial markers placement  plan to give radiation therapy to a total dose of 7020 cGy in 26 treatments targeted to the prostate gland only.   8-  Transrectal ultrasound sizing and transrectal ultrasound guided placement of fiducials     5-  EXAM: CT ABDOMEN PELVIS W CONTRAST  LOCATION: [facility]  DATE/TIME: 5/31/2023 12:00 PM CDT  VASCULATURE: Calcific atheromatous change in the abdominal aorta. No aneurysm.                               IMPRESSION:   1.  Prostatic enlargement and inhomogeneity. Urinary bladder wall thickening likely due to outlet obstruction.  2.  Colonic diverticula without CT evidence for diverticulitis.     3-1-2023 MRI  IMPRESSION:  1. Based on the most suspicious abnormality, this exam is characterized as PIRADS 5 - Clinically significant cancer is highly likely to be present.  The most suspicious abnormality is located at  the right and left anterior transition zone at the base and mid gland and there is long segment capsular abutment indicating moderate suspicion of minimal extraprostatic extension.  2. No suspicious adenopathy or evidence of pelvic metastases.    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  50 yrs, about 1 ppd     1-6-2023  IMPRESSION:  1.  Couple tiny pulmonary nodules.  2.  Severe coronary calcifications.  3.  Mild airway thickening.  4.  Emphysema  RADIOLOGIST RECOMMENDATION: Continue annual screening with low-dose CT chest in 12 months.    COPD, Chronic bronchitis, with emphysema seen on CT scan  Used to take Spiriva  Experiences Dyspnea on exertion walking half a block. Gets respiratory infection about 1 a year     I filled out a handicap parking application for him, since he must stop to rest after walking less than 200 feet because of COPD     History of shingles that affected his right forehead and scalp, near the eye, but did not have ocular involvement when [facility], January 2021   I told Loy that shingles vaccination could be considered, which she would get at a community pharmacy, since it is paid under the Medicare prescription drug benefit.     Mixed hyperlipidemia  Rosuvastatin 40 mg daily  12-8-2022  Direct Measure HDL >=40 mg/dL 28 Low       LDL Cholesterol Calculated <=100 mg/dL 94       Triglycerides <150 mg/dL 119       Essential hypertension  metoprolol succinate (TOPROL-XL) 50 MG 24 hr tablet  Lisinopril 5 mg daily    Target blood pressure is under 130 systolic when measured at rest in the seated position on the right upper arm.  BP Readings from Last 6 Encounters:   09/08/23 126/56   08/31/23 (!) 140/81   08/29/23 (!) 157/72   07/18/23 (!) 168/72   06/27/23 (!) 153/77   06/13/23 (!) 157/70     Obesity body mass index 31.  Would like to see him lose about 15 pounds in the first 2023  He told with alcohol consumption is minimal.  He might eat in a restaurant once a month.  I reminded him about the importance of eating slowly, controlling portion size, and identifying problem foods to curtail or limit such starches, sweets, and fried foods.   Wt Readings from  Last 5 Encounters:   09/08/23 82.1 kg (181 lb)   08/29/23 83.6 kg (184 lb 6.4 oz)   07/18/23 85.1 kg (187 lb 11.2 oz)   06/27/23 85.7 kg (189 lb)   06/08/23 85.3 kg (188 lb)     Special screening for malignant neoplasms, colon  12-  COLOGUARD-ABSTRACT Negative      COVID-19 Vaccine Bivalent Booster 18+ (Moderna) 12/08/2022      We will give Loy a seasonal flu shot today September 8, 2023       --------------------------------------------------------------------------------------------------------------------------  History of Present Illness  This 73 year old old     CORONARY ANGIOGRAPHY AND POSSIBLE PERCUTANEOUS REVASCULARIZATION, which is scheduled for next week on September 13, 2023, for indication of suspicion for severe coronary artery disease, Loy has little in the way of anginal symptoms (reports chest pain that might occur once a month), but his cardiac MRI shows signs of previous nontransmural myocardial infarction in the inferolateral wall    Wt Readings from Last 3 Encounters:   09/08/23 82.1 kg (181 lb)   08/29/23 83.6 kg (184 lb 6.4 oz)   07/18/23 85.1 kg (187 lb 11.2 oz)     BP Readings from Last 3 Encounters:   09/08/23 126/56   08/31/23 (!) 140/81   08/29/23 (!) 157/72       Review of Systems: A comprehensive review of systems was negative except as noted.  ---------------------------------------------------------------------------------------------------------------------------    Medications, Allergies, Social, and Problem List     Current Outpatient Medications   Medication Sig Dispense Refill    albuterol (PROAIR HFA/PROVENTIL HFA/VENTOLIN HFA) 108 (90 Base) MCG/ACT inhaler Inhale 2 puffs into the lungs every 6 hours as needed for shortness of breath, wheezing or cough 18 g 3    aspirin 81 MG EC tablet Take 81 mg by mouth daily      levothyroxine (SYNTHROID/LEVOTHROID) 88 MCG tablet Take 1 tablet (88 mcg) by mouth daily 90 tablet 3    lisinopril (ZESTRIL) 5 MG tablet Take 1 tablet (5  "mg) by mouth daily 90 tablet 3    metoprolol succinate ER (TOPROL XL) 50 MG 24 hr tablet Take 1 tablet (50 mg) by mouth daily 90 tablet 3    nitroGLYcerin (NITROSTAT) 0.3 MG sublingual tablet Place 1 tablet (0.3 mg) under the tongue every 5 minutes as needed for chest pain 20 tablet 3    rosuvastatin (CRESTOR) 40 MG tablet Take 1 tablet (40 mg) by mouth daily (Patient not taking: Reported on 7/18/2023) 90 tablet 3     No Known Allergies  Social History     Tobacco Use    Smoking status: Some Days     Types: Cigarettes     Start date: 05/2022    Smokeless tobacco: Never    Tobacco comments:     Has 1 cigarette every so often     Patient Active Problem List   Diagnosis    Coronary artery disease involving native coronary artery    History of coronary artery stent placement    Mixed hyperlipidemia    Essential hypertension    Tobacco abuse    COPD (chronic obstructive pulmonary disease) (H)    Class 1 obesity due to excess calories without serious comorbidity with body mass index (BMI) of 31.0 to 31.9 in adult    Other specified hypothyroidism    Prostate cancer (H)    Hypothyroidism due to acquired atrophy of thyroid        Reviewed, reconciled and updated       Physical Exam   General Appearance:       /56 (BP Location: Right arm, Patient Position: Sitting, Cuff Size: Adult Regular)   Pulse 56   Temp 97.9  F (36.6  C)   Resp 16   Ht 1.715 m (5' 7.5\")   Wt 82.1 kg (181 lb)   SpO2 96%   BMI 27.93 kg/m      Loy looks good today, blood pressure quite satisfactory, oxygen saturation 96% acceptable  Oropharynx clear  Neck good range of motion  Lungs clear no wheezes  Heart regular rate rhythm no murmur  Abdomen nontender  Extremities no edema     Additional Information   I spent 30 minutes on this encounter, including reviewing interval history since last visit, examining the patient, explaining and counseling the issues enumerated in the Assessment and Plan (patient given a copy)       CUATE KING MD, " MD

## 2023-09-12 ENCOUNTER — APPOINTMENT (OUTPATIENT)
Dept: RADIATION ONCOLOGY | Facility: CLINIC | Age: 73
End: 2023-09-12
Attending: RADIOLOGY
Payer: MEDICARE

## 2023-09-12 PROCEDURE — 77338 DESIGN MLC DEVICE FOR IMRT: CPT | Performed by: RADIOLOGY

## 2023-09-12 PROCEDURE — 77301 RADIOTHERAPY DOSE PLAN IMRT: CPT | Performed by: RADIOLOGY

## 2023-09-12 PROCEDURE — 77338 DESIGN MLC DEVICE FOR IMRT: CPT | Mod: 26 | Performed by: RADIOLOGY

## 2023-09-12 PROCEDURE — 77300 RADIATION THERAPY DOSE PLAN: CPT | Performed by: RADIOLOGY

## 2023-09-12 PROCEDURE — 77301 RADIOTHERAPY DOSE PLAN IMRT: CPT | Mod: 26 | Performed by: RADIOLOGY

## 2023-09-12 PROCEDURE — 77300 RADIATION THERAPY DOSE PLAN: CPT | Mod: 26 | Performed by: RADIOLOGY

## 2023-09-13 ENCOUNTER — HOSPITAL ENCOUNTER (OUTPATIENT)
Facility: HOSPITAL | Age: 73
Discharge: HOME | End: 2023-09-13
Attending: INTERNAL MEDICINE | Admitting: INTERNAL MEDICINE
Payer: MEDICARE

## 2023-09-13 VITALS
SYSTOLIC BLOOD PRESSURE: 154 MMHG | HEART RATE: 73 BPM | DIASTOLIC BLOOD PRESSURE: 64 MMHG | TEMPERATURE: 98.3 F | OXYGEN SATURATION: 97 % | RESPIRATION RATE: 18 BRPM

## 2023-09-13 DIAGNOSIS — R06.02 SOB (SHORTNESS OF BREATH): ICD-10-CM

## 2023-09-13 DIAGNOSIS — Z95.5 S/P CORONARY ARTERY STENT PLACEMENT: Primary | ICD-10-CM

## 2023-09-13 DIAGNOSIS — Z01.812 PRE-PROCEDURE LAB EXAM: ICD-10-CM

## 2023-09-13 DIAGNOSIS — R94.39 ABNORMAL CARDIOVASCULAR STRESS TEST: ICD-10-CM

## 2023-09-13 DIAGNOSIS — Z95.5 S/P DRUG ELUTING CORONARY STENT PLACEMENT: Primary | ICD-10-CM

## 2023-09-13 PROBLEM — Z98.890 STATUS POST CORONARY ANGIOGRAM: Status: ACTIVE | Noted: 2023-09-13

## 2023-09-13 LAB
ABO/RH(D): NORMAL
ACT BLD: 293 SECONDS (ref 74–150)
ANION GAP SERPL CALCULATED.3IONS-SCNC: 11 MMOL/L (ref 7–15)
ANTIBODY SCREEN: NEGATIVE
ATRIAL RATE - MUSE: 53 BPM
ATRIAL RATE - MUSE: 62 BPM
BUN SERPL-MCNC: 16.7 MG/DL (ref 8–23)
CALCIUM SERPL-MCNC: 9.1 MG/DL (ref 8.8–10.2)
CHLORIDE SERPL-SCNC: 103 MMOL/L (ref 98–107)
CHOLEST SERPL-MCNC: 132 MG/DL
CREAT SERPL-MCNC: 1.31 MG/DL (ref 0.67–1.17)
DEPRECATED HCO3 PLAS-SCNC: 25 MMOL/L (ref 22–29)
DIASTOLIC BLOOD PRESSURE - MUSE: NORMAL MMHG
DIASTOLIC BLOOD PRESSURE - MUSE: NORMAL MMHG
EGFRCR SERPLBLD CKD-EPI 2021: 58 ML/MIN/1.73M2
ERYTHROCYTE [DISTWIDTH] IN BLOOD BY AUTOMATED COUNT: 15.6 % (ref 10–15)
GLUCOSE SERPL-MCNC: 116 MG/DL (ref 70–99)
HCT VFR BLD AUTO: 45.3 % (ref 40–53)
HDLC SERPL-MCNC: 25 MG/DL
HGB BLD-MCNC: 14.4 G/DL (ref 13.3–17.7)
INTERPRETATION ECG - MUSE: NORMAL
INTERPRETATION ECG - MUSE: NORMAL
LDLC SERPL CALC-MCNC: 77 MG/DL
MCH RBC QN AUTO: 27.5 PG (ref 26.5–33)
MCHC RBC AUTO-ENTMCNC: 31.8 G/DL (ref 31.5–36.5)
MCV RBC AUTO: 87 FL (ref 78–100)
NONHDLC SERPL-MCNC: 107 MG/DL
P AXIS - MUSE: 71 DEGREES
P AXIS - MUSE: 73 DEGREES
PLATELET # BLD AUTO: 258 10E3/UL (ref 150–450)
POTASSIUM SERPL-SCNC: 4.2 MMOL/L (ref 3.4–5.3)
PR INTERVAL - MUSE: 146 MS
PR INTERVAL - MUSE: 150 MS
QRS DURATION - MUSE: 138 MS
QRS DURATION - MUSE: 144 MS
QT - MUSE: 450 MS
QT - MUSE: 488 MS
QTC - MUSE: 456 MS
QTC - MUSE: 457 MS
R AXIS - MUSE: 66 DEGREES
R AXIS - MUSE: 68 DEGREES
RBC # BLD AUTO: 5.24 10E6/UL (ref 4.4–5.9)
SODIUM SERPL-SCNC: 139 MMOL/L (ref 136–145)
SPECIMEN EXPIRATION DATE: NORMAL
SYSTOLIC BLOOD PRESSURE - MUSE: NORMAL MMHG
SYSTOLIC BLOOD PRESSURE - MUSE: NORMAL MMHG
T AXIS - MUSE: 12 DEGREES
T AXIS - MUSE: 3 DEGREES
TRIGL SERPL-MCNC: 150 MG/DL
VENTRICULAR RATE- MUSE: 53 BPM
VENTRICULAR RATE- MUSE: 62 BPM
WBC # BLD AUTO: 11.8 10E3/UL (ref 4–11)

## 2023-09-13 PROCEDURE — 80061 LIPID PANEL: CPT | Performed by: INTERNAL MEDICINE

## 2023-09-13 PROCEDURE — 99152 MOD SED SAME PHYS/QHP 5/>YRS: CPT | Performed by: INTERNAL MEDICINE

## 2023-09-13 PROCEDURE — 93005 ELECTROCARDIOGRAM TRACING: CPT

## 2023-09-13 PROCEDURE — 250N000013 HC RX MED GY IP 250 OP 250 PS 637: Performed by: NURSE PRACTITIONER

## 2023-09-13 PROCEDURE — C1874 STENT, COATED/COV W/DEL SYS: HCPCS | Performed by: INTERNAL MEDICINE

## 2023-09-13 PROCEDURE — 93458 L HRT ARTERY/VENTRICLE ANGIO: CPT | Mod: XU | Performed by: INTERNAL MEDICINE

## 2023-09-13 PROCEDURE — 93010 ELECTROCARDIOGRAM REPORT: CPT | Mod: HOP | Performed by: INTERNAL MEDICINE

## 2023-09-13 PROCEDURE — 36415 COLL VENOUS BLD VENIPUNCTURE: CPT | Performed by: INTERNAL MEDICINE

## 2023-09-13 PROCEDURE — C1887 CATHETER, GUIDING: HCPCS | Performed by: INTERNAL MEDICINE

## 2023-09-13 PROCEDURE — C1894 INTRO/SHEATH, NON-LASER: HCPCS | Performed by: INTERNAL MEDICINE

## 2023-09-13 PROCEDURE — 255N000002 HC RX 255 OP 636: Performed by: INTERNAL MEDICINE

## 2023-09-13 PROCEDURE — 86901 BLOOD TYPING SEROLOGIC RH(D): CPT | Performed by: INTERNAL MEDICINE

## 2023-09-13 PROCEDURE — 258N000003 HC RX IP 258 OP 636: Performed by: INTERNAL MEDICINE

## 2023-09-13 PROCEDURE — C1769 GUIDE WIRE: HCPCS | Performed by: INTERNAL MEDICINE

## 2023-09-13 PROCEDURE — 86850 RBC ANTIBODY SCREEN: CPT | Performed by: INTERNAL MEDICINE

## 2023-09-13 PROCEDURE — 250N000011 HC RX IP 250 OP 636: Mod: JZ | Performed by: INTERNAL MEDICINE

## 2023-09-13 PROCEDURE — 85014 HEMATOCRIT: CPT | Performed by: INTERNAL MEDICINE

## 2023-09-13 PROCEDURE — C1725 CATH, TRANSLUMIN NON-LASER: HCPCS | Performed by: INTERNAL MEDICINE

## 2023-09-13 PROCEDURE — 93458 L HRT ARTERY/VENTRICLE ANGIO: CPT | Mod: 26 | Performed by: INTERNAL MEDICINE

## 2023-09-13 PROCEDURE — C9600 PERC DRUG-EL COR STENT SING: HCPCS | Mod: RC | Performed by: INTERNAL MEDICINE

## 2023-09-13 PROCEDURE — 999N000054 HC STATISTIC EKG NON-CHARGEABLE

## 2023-09-13 PROCEDURE — 85347 COAGULATION TIME ACTIVATED: CPT

## 2023-09-13 PROCEDURE — 92928 PRQ TCAT PLMT NTRAC ST 1 LES: CPT | Mod: RC | Performed by: INTERNAL MEDICINE

## 2023-09-13 PROCEDURE — 250N000009 HC RX 250: Performed by: INTERNAL MEDICINE

## 2023-09-13 PROCEDURE — 272N000001 HC OR GENERAL SUPPLY STERILE: Performed by: INTERNAL MEDICINE

## 2023-09-13 PROCEDURE — 80048 BASIC METABOLIC PNL TOTAL CA: CPT | Performed by: INTERNAL MEDICINE

## 2023-09-13 PROCEDURE — 77301 RADIOTHERAPY DOSE PLAN IMRT: CPT | Performed by: RADIOLOGY

## 2023-09-13 DEVICE — STENT CORONARY DES SYNERGY XD MR US 3.00X38MM H7493941838300: Type: IMPLANTABLE DEVICE | Status: FUNCTIONAL

## 2023-09-13 DEVICE — STENT CORONARY SYNERGY XD MR US 3.5X48MM H7493941848350: Type: IMPLANTABLE DEVICE | Status: FUNCTIONAL

## 2023-09-13 RX ORDER — ASPIRIN 81 MG/1
243 TABLET, CHEWABLE ORAL ONCE
Status: COMPLETED | OUTPATIENT
Start: 2023-09-13 | End: 2023-09-13

## 2023-09-13 RX ORDER — LIDOCAINE 40 MG/G
CREAM TOPICAL
Status: DISCONTINUED | OUTPATIENT
Start: 2023-09-13 | End: 2023-09-13 | Stop reason: HOSPADM

## 2023-09-13 RX ORDER — NITROGLYCERIN 0.4 MG/1
0.4 TABLET SUBLINGUAL EVERY 5 MIN PRN
Status: DISCONTINUED | OUTPATIENT
Start: 2023-09-13 | End: 2023-09-13 | Stop reason: HOSPADM

## 2023-09-13 RX ORDER — FLUMAZENIL 0.1 MG/ML
0.2 INJECTION, SOLUTION INTRAVENOUS
Status: DISCONTINUED | OUTPATIENT
Start: 2023-09-13 | End: 2023-09-13 | Stop reason: HOSPADM

## 2023-09-13 RX ORDER — FENTANYL CITRATE 50 UG/ML
25 INJECTION, SOLUTION INTRAMUSCULAR; INTRAVENOUS
Status: DISCONTINUED | OUTPATIENT
Start: 2023-09-13 | End: 2023-09-13 | Stop reason: HOSPADM

## 2023-09-13 RX ORDER — ONDANSETRON 4 MG/1
4 TABLET, ORALLY DISINTEGRATING ORAL EVERY 6 HOURS PRN
Status: DISCONTINUED | OUTPATIENT
Start: 2023-09-13 | End: 2023-09-13 | Stop reason: HOSPADM

## 2023-09-13 RX ORDER — ACETAMINOPHEN 325 MG/1
650 TABLET ORAL EVERY 4 HOURS PRN
Status: DISCONTINUED | OUTPATIENT
Start: 2023-09-13 | End: 2023-09-13 | Stop reason: HOSPADM

## 2023-09-13 RX ORDER — SODIUM CHLORIDE 9 MG/ML
INJECTION, SOLUTION INTRAVENOUS CONTINUOUS
Status: DISCONTINUED | OUTPATIENT
Start: 2023-09-13 | End: 2023-09-13 | Stop reason: HOSPADM

## 2023-09-13 RX ORDER — FENTANYL CITRATE 50 UG/ML
INJECTION, SOLUTION INTRAMUSCULAR; INTRAVENOUS
Status: DISCONTINUED | OUTPATIENT
Start: 2023-09-13 | End: 2023-09-13 | Stop reason: HOSPADM

## 2023-09-13 RX ORDER — METOPROLOL TARTRATE 1 MG/ML
5 INJECTION, SOLUTION INTRAVENOUS
Status: DISCONTINUED | OUTPATIENT
Start: 2023-09-13 | End: 2023-09-13 | Stop reason: HOSPADM

## 2023-09-13 RX ORDER — NALOXONE HYDROCHLORIDE 0.4 MG/ML
0.2 INJECTION, SOLUTION INTRAMUSCULAR; INTRAVENOUS; SUBCUTANEOUS
Status: DISCONTINUED | OUTPATIENT
Start: 2023-09-13 | End: 2023-09-13 | Stop reason: HOSPADM

## 2023-09-13 RX ORDER — EPTIFIBATIDE 2 MG/ML
INJECTION, SOLUTION INTRAVENOUS
Status: DISCONTINUED | OUTPATIENT
Start: 2023-09-13 | End: 2023-09-13 | Stop reason: HOSPADM

## 2023-09-13 RX ORDER — HYDRALAZINE HYDROCHLORIDE 20 MG/ML
10 INJECTION INTRAMUSCULAR; INTRAVENOUS EVERY 4 HOURS PRN
Status: DISCONTINUED | OUTPATIENT
Start: 2023-09-13 | End: 2023-09-13 | Stop reason: HOSPADM

## 2023-09-13 RX ORDER — HYDRALAZINE HYDROCHLORIDE 20 MG/ML
INJECTION INTRAMUSCULAR; INTRAVENOUS
Status: DISCONTINUED | OUTPATIENT
Start: 2023-09-13 | End: 2023-09-13 | Stop reason: HOSPADM

## 2023-09-13 RX ORDER — HEPARIN SODIUM 1000 [USP'U]/ML
INJECTION, SOLUTION INTRAVENOUS; SUBCUTANEOUS
Status: DISCONTINUED | OUTPATIENT
Start: 2023-09-13 | End: 2023-09-13 | Stop reason: HOSPADM

## 2023-09-13 RX ORDER — DIAZEPAM 5 MG
5 TABLET ORAL ONCE
Status: COMPLETED | OUTPATIENT
Start: 2023-09-13 | End: 2023-09-13

## 2023-09-13 RX ORDER — CLOPIDOGREL BISULFATE 75 MG/1
75 TABLET ORAL DAILY
Status: DISCONTINUED | OUTPATIENT
Start: 2023-09-14 | End: 2023-09-13 | Stop reason: HOSPADM

## 2023-09-13 RX ORDER — ONDANSETRON 2 MG/ML
4 INJECTION INTRAMUSCULAR; INTRAVENOUS EVERY 6 HOURS PRN
Status: DISCONTINUED | OUTPATIENT
Start: 2023-09-13 | End: 2023-09-13 | Stop reason: HOSPADM

## 2023-09-13 RX ORDER — OXYCODONE HYDROCHLORIDE 5 MG/1
10 TABLET ORAL EVERY 4 HOURS PRN
Status: DISCONTINUED | OUTPATIENT
Start: 2023-09-13 | End: 2023-09-13 | Stop reason: HOSPADM

## 2023-09-13 RX ORDER — CLOPIDOGREL BISULFATE 75 MG/1
75 TABLET ORAL DAILY
Qty: 90 TABLET | Refills: 3 | Status: SHIPPED | OUTPATIENT
Start: 2023-09-14

## 2023-09-13 RX ORDER — ATROPINE SULFATE 0.1 MG/ML
0.5 INJECTION INTRAVENOUS
Status: DISCONTINUED | OUTPATIENT
Start: 2023-09-13 | End: 2023-09-13 | Stop reason: HOSPADM

## 2023-09-13 RX ORDER — ASPIRIN 81 MG/1
81 TABLET ORAL DAILY
Status: DISCONTINUED | OUTPATIENT
Start: 2023-09-14 | End: 2023-09-13 | Stop reason: HOSPADM

## 2023-09-13 RX ORDER — OXYCODONE HYDROCHLORIDE 5 MG/1
5 TABLET ORAL EVERY 4 HOURS PRN
Status: DISCONTINUED | OUTPATIENT
Start: 2023-09-13 | End: 2023-09-13 | Stop reason: HOSPADM

## 2023-09-13 RX ORDER — ASPIRIN 325 MG
325 TABLET ORAL ONCE
Status: COMPLETED | OUTPATIENT
Start: 2023-09-13 | End: 2023-09-13

## 2023-09-13 RX ORDER — NALOXONE HYDROCHLORIDE 0.4 MG/ML
0.4 INJECTION, SOLUTION INTRAMUSCULAR; INTRAVENOUS; SUBCUTANEOUS
Status: DISCONTINUED | OUTPATIENT
Start: 2023-09-13 | End: 2023-09-13 | Stop reason: HOSPADM

## 2023-09-13 RX ORDER — IODIXANOL 320 MG/ML
INJECTION, SOLUTION INTRAVASCULAR
Status: DISCONTINUED | OUTPATIENT
Start: 2023-09-13 | End: 2023-09-13 | Stop reason: HOSPADM

## 2023-09-13 RX ADMIN — DIAZEPAM 5 MG: 5 TABLET ORAL at 07:44

## 2023-09-13 RX ADMIN — SODIUM CHLORIDE: 9 INJECTION, SOLUTION INTRAVENOUS at 07:23

## 2023-09-13 RX ADMIN — HYDRALAZINE HYDROCHLORIDE 10 MG: 20 INJECTION INTRAMUSCULAR; INTRAVENOUS at 11:32

## 2023-09-13 ASSESSMENT — EJECTION FRACTION: EF_VALUE: .09

## 2023-09-13 ASSESSMENT — ACTIVITIES OF DAILY LIVING (ADL)
ADLS_ACUITY_SCORE: 35

## 2023-09-13 NOTE — PLAN OF CARE
Pt alert and oriented. Vss on room air. Sinus dominique on monitor. Denies any cardiac signs or symptoms this am. Wife katelyn visiting with patient and can be updated on plan of care. Pt prepped and ready for angiogram.

## 2023-09-13 NOTE — PRE-PROCEDURE
GENERAL PRE-PROCEDURE:   Procedure:  Coronary angiogram with possible PCI  Date/Time:  9/13/2023 6:55 AM    Written consent obtained?: Yes    Risks and benefits: Risks, benefits and alternatives were discussed    Consent given by:  Patient  Patient states understanding of procedure being performed: Yes    Patient's understanding of procedure matches consent: Yes    Procedure consent matches procedure scheduled: Yes    Expected level of sedation:  Moderate  Appropriately NPO:  Yes  ASA Class:  3 (CAD with hx of MI, HTN, HLD, PVD, tobacco use disorder, Hx of prostate CA)  Mallampati  :  Grade 2- soft palate, base of uvula, tonsillar pillars, and portion of posterior pharyngeal wall visible  Lungs:  Other (comment)  Lung exam comment:  Scattered rhonchi  Heart:  Other (comment)  Heart exam comment:  Rahul, distant S1, S2  History & Physical reviewed:  History and physical reviewed and updates made (see comment)  H&P Comments:  Clinically Significant Risk Factors Present on Admission    Cardiovascular : Not present on admission    Fluid & Electrolyte Disorders : Not present on admission    Gastroenterology : Not present on admission    Hematology/Oncology : Not present on admission    Nephrology : Not present on admission    Neurology : Not present on admission    Pulmonology : Not present on admission    Systemic : Not present on admission    Statement of review:  I have reviewed the lab findings, diagnostic data, medications, and the plan for sedation

## 2023-09-13 NOTE — Clinical Note
The first balloon was inserted into the right coronary artery and distal right coronary artery.Max pressure = 6 janae. Total duration = 20 seconds.

## 2023-09-13 NOTE — Clinical Note
Stent deployed in the middle right coronary artery. Max pressure = 16 janae. Total duration = 30 seconds.

## 2023-09-13 NOTE — DISCHARGE INSTRUCTIONS

## 2023-09-13 NOTE — Clinical Note
Stent deployed in the distal right coronary artery. Max pressure = 14 janae. Total duration = 14 seconds.

## 2023-09-13 NOTE — Clinical Note
The first balloon was inserted into the right coronary artery and proximal right coronary artery.Max pressure = 14 janae. Total duration = 14 seconds.

## 2023-09-13 NOTE — PLAN OF CARE
Pt alert and oriented. Vss on room air. Nsr on monitor. Denies any cardiac signs/symptoms at this time. Right radial procedural site cdi. CMS+. TR band removed. Dressing in place cdi. CMS+. No signs of bleeding/hematoma at this time. Pt ready to be discharged to home with wife Aidee. Discharge teaching given to patient. Patient understands discharge teaching. All belongings remain with patient.

## 2023-09-13 NOTE — INTERVAL H&P NOTE
"I have reviewed the surgical (or preoperative) H&P that is linked to this encounter, and examined the patient. There are no significant changes    Clinical Conditions Present on Arrival:  Clinically Significant Risk Factors Present on Admission                 # Drug Induced Platelet Defect: home medication list includes an antiplatelet medication  # Overweight: Estimated body mass index is 27.93 kg/m  as calculated from the following:    Height as of 9/8/23: 1.715 m (5' 7.5\").    Weight as of 9/8/23: 82.1 kg (181 lb).       "

## 2023-09-13 NOTE — Clinical Note
The first balloon was inserted into the right coronary artery and distal right coronary artery.Max pressure = 8 janae. Total duration = 8 seconds.     Max pressure = 12 janae. Total duration = 5 seconds.    Balloon reinflated a second time: Max pressure = 12 janae. Total duration = 5 seconds.  Balloon reinflated a third time: Max pressure = 14 janae. Total duration = 14 seconds.  Balloon reinflated a fourth time: Max pressure = 14 janae. Total du ration = 14 seconds.  Balloon reinflated a fourth time: Max pressure = 14 janae. Total duration = 14 seconds. Balloon deployed distally moving proximally.

## 2023-09-20 ENCOUNTER — APPOINTMENT (OUTPATIENT)
Dept: RADIATION ONCOLOGY | Facility: CLINIC | Age: 73
End: 2023-09-20
Attending: RADIOLOGY
Payer: MEDICARE

## 2023-09-20 VITALS
DIASTOLIC BLOOD PRESSURE: 67 MMHG | OXYGEN SATURATION: 97 % | BODY MASS INDEX: 28.62 KG/M2 | SYSTOLIC BLOOD PRESSURE: 155 MMHG | WEIGHT: 185.5 LBS | TEMPERATURE: 97.7 F | RESPIRATION RATE: 16 BRPM | HEART RATE: 49 BPM

## 2023-09-20 DIAGNOSIS — C61 PROSTATE CANCER (H): Primary | ICD-10-CM

## 2023-09-20 PROCEDURE — 77014 PR CT GUIDE FOR PLACEMENT RADIATION THERAPY FIELDS: CPT | Mod: 26 | Performed by: RADIOLOGY

## 2023-09-20 PROCEDURE — 77385 HC IMRT TREATMENT DELIVERY, SIMPLE: CPT | Performed by: RADIOLOGY

## 2023-09-20 ASSESSMENT — PAIN SCALES - GENERAL: PAINLEVEL: NO PAIN (0)

## 2023-09-20 NOTE — PROGRESS NOTES
RADIATION ONCOLOGY WEEKLY TREATMENT VISIT NOTE      Assessment / Impression     Prostate cancer (H) [C61]     Tolerating radiation therapy well.  All questions and concerns addressed.    Plan:     Continue radiation treatment as prescribed.    Subjective:      HPI: Loy Reid is a 73 year old male with  Prostate cancer (H) [C61]    The following portions of the patient's history were reviewed and updated as appropriate: allergies, current medications, past family history, past medical history, past social history, past surgical history and problem list.    Assessment                  Body Site:  Pelvis  Stereotactic Radiosurgery: No  Today's Dose: 270  Total Dose for Pelvis: 7020  Today's Fraction/Total Fraction Pelvis:   Diarrhea W/O Colostomy: 0: None  Constipation: 0: None  Proctitis: 0: None  Urinary frequency and/or urgency: 1: Increase in frequency or nocturia up to two times normal  Urinary Retention: 1: Hesitancy or dribbling. No sig residual, retention during immediate post op  Urinary Incontinence: 1: Stress incontinence  Dysuria: 0: None  Nocturia: 2  Urine Color Change: 0: Normal                                   Sexuality Alteration                    Emotional Alteration    Copin: Effective  Comfort Alteration   KPS: 100 % Normal, no complaints  Fatigue (ONS scale): 0: No Fatigue  Pain Location: denies   Nutrition Alteration   Anorexia: 0: None  Nausea: 0: None  Vomitin: None  Weight: 84.1 kg (185 lb 8 oz)  Skin Alteration   Skin Sensation: 0: No problem  Skin Reaction: 0: None  AUA Assessment                                           Accompanied by       Objective:     Exam: Examination reviewed no significant changes.    Vitals:    23 1050   BP: (!) 155/67   Pulse: (!) 49   Resp: 16   Temp: 97.7  F (36.5  C)   TempSrc: Oral   SpO2: 97%   Weight: 84.1 kg (185 lb 8 oz)   PainSc: No Pain (0)       Wt Readings from Last 8 Encounters:   23 84.1 kg (185 lb 8 oz)    09/08/23 82.1 kg (181 lb)   08/29/23 83.6 kg (184 lb 6.4 oz)   07/18/23 85.1 kg (187 lb 11.2 oz)   06/27/23 85.7 kg (189 lb)   06/08/23 85.3 kg (188 lb)   06/06/23 85.8 kg (189 lb 3.2 oz)   05/05/23 87.5 kg (193 lb)       General: Alert and oriented, in no acute distress  Loy has no Erythema.  Aria chart and setup information reviewed    Lacey Young MD

## 2023-09-20 NOTE — LETTER
2023         RE: Loy Reid  [address] [address]        Dear Colleague,    Thank you for referring your patient, Loy Reid, to the [facility] RADIATION ONCOLOGY [place]. Please see a copy of my visit note below.    RADIATION ONCOLOGY WEEKLY TREATMENT VISIT NOTE      Assessment / Impression     Prostate cancer (H) [C61]     Tolerating radiation therapy well.  All questions and concerns addressed.    Plan:     Continue radiation treatment as prescribed.    Subjective:      HPI: Loy Reid is a 73 year old male with  Prostate cancer (H) [C61]    The following portions of the patient's history were reviewed and updated as appropriate: allergies, current medications, past family history, past medical history, past social history, past surgical history and problem list.    Assessment                  Body Site:  Pelvis  Stereotactic Radiosurgery: No  Today's Dose: 270  Total Dose for Pelvis: 7020  Today's Fraction/Total Fraction Pelvis:   Diarrhea W/O Colostomy: 0: None  Constipation: 0: None  Proctitis: 0: None  Urinary frequency and/or urgency: 1: Increase in frequency or nocturia up to two times normal  Urinary Retention: 1: Hesitancy or dribbling. No sig residual, retention during immediate post op  Urinary Incontinence: 1: Stress incontinence  Dysuria: 0: None  Nocturia: 2  Urine Color Change: 0: Normal                                   Sexuality Alteration                    Emotional Alteration    Copin: Effective  Comfort Alteration   KPS: 100 % Normal, no complaints  Fatigue (ONS scale): 0: No Fatigue  Pain Location: denies   Nutrition Alteration   Anorexia: 0: None  Nausea: 0: None  Vomitin: None  Weight: 84.1 kg (185 lb 8 oz)  Skin Alteration   Skin Sensation: 0: No problem  Skin Reaction: 0: None  AUA Assessment                                           Accompanied by       Objective:     Exam: Examination reviewed no significant  changes.    Vitals:    09/20/23 1050   BP: (!) 155/67   Pulse: (!) 49   Resp: 16   Temp: 97.7  F (36.5  C)   TempSrc: Oral   SpO2: 97%   Weight: 84.1 kg (185 lb 8 oz)   PainSc: No Pain (0)       Wt Readings from Last 8 Encounters:   09/20/23 84.1 kg (185 lb 8 oz)   09/08/23 82.1 kg (181 lb)   08/29/23 83.6 kg (184 lb 6.4 oz)   07/18/23 85.1 kg (187 lb 11.2 oz)   06/27/23 85.7 kg (189 lb)   06/08/23 85.3 kg (188 lb)   06/06/23 85.8 kg (189 lb 3.2 oz)   05/05/23 87.5 kg (193 lb)       General: Alert and oriented, in no acute distress  Loy has no Erythema.  Aria chart and setup information reviewed    Lacey Young MD      Again, thank you for allowing me to participate in the care of your patient.        Sincerely,        Lacey Young MD

## 2023-09-21 ENCOUNTER — APPOINTMENT (OUTPATIENT)
Dept: RADIATION ONCOLOGY | Facility: CLINIC | Age: 73
End: 2023-09-21
Attending: RADIOLOGY
Payer: MEDICARE

## 2023-09-21 PROCEDURE — 77014 PR CT GUIDE FOR PLACEMENT RADIATION THERAPY FIELDS: CPT | Mod: 26 | Performed by: STUDENT IN AN ORGANIZED HEALTH CARE EDUCATION/TRAINING PROGRAM

## 2023-09-21 PROCEDURE — 77385 HC IMRT TREATMENT DELIVERY, SIMPLE: CPT | Performed by: STUDENT IN AN ORGANIZED HEALTH CARE EDUCATION/TRAINING PROGRAM

## 2023-09-22 ENCOUNTER — APPOINTMENT (OUTPATIENT)
Dept: RADIATION ONCOLOGY | Facility: CLINIC | Age: 73
End: 2023-09-22
Attending: RADIOLOGY
Payer: MEDICARE

## 2023-09-22 PROCEDURE — 77014 PR CT GUIDE FOR PLACEMENT RADIATION THERAPY FIELDS: CPT | Mod: 26 | Performed by: STUDENT IN AN ORGANIZED HEALTH CARE EDUCATION/TRAINING PROGRAM

## 2023-09-22 PROCEDURE — 77385 HC IMRT TREATMENT DELIVERY, SIMPLE: CPT | Performed by: STUDENT IN AN ORGANIZED HEALTH CARE EDUCATION/TRAINING PROGRAM

## 2023-09-25 ENCOUNTER — APPOINTMENT (OUTPATIENT)
Dept: RADIATION ONCOLOGY | Facility: CLINIC | Age: 73
End: 2023-09-25
Attending: RADIOLOGY
Payer: MEDICARE

## 2023-09-25 PROCEDURE — 77014 PR CT GUIDE FOR PLACEMENT RADIATION THERAPY FIELDS: CPT | Mod: 26 | Performed by: RADIOLOGY

## 2023-09-25 PROCEDURE — 77385 HC IMRT TREATMENT DELIVERY, SIMPLE: CPT | Performed by: RADIOLOGY

## 2023-09-26 ENCOUNTER — OFFICE VISIT (OUTPATIENT)
Dept: CARDIOLOGY | Facility: CLINIC | Age: 73
End: 2023-09-26
Payer: MEDICARE

## 2023-09-26 ENCOUNTER — APPOINTMENT (OUTPATIENT)
Dept: RADIATION ONCOLOGY | Facility: CLINIC | Age: 73
End: 2023-09-26
Attending: RADIOLOGY
Payer: MEDICARE

## 2023-09-26 VITALS
HEIGHT: 68 IN | HEART RATE: 48 BPM | WEIGHT: 185.1 LBS | BODY MASS INDEX: 28.05 KG/M2 | OXYGEN SATURATION: 97 % | DIASTOLIC BLOOD PRESSURE: 50 MMHG | SYSTOLIC BLOOD PRESSURE: 130 MMHG | RESPIRATION RATE: 16 BRPM

## 2023-09-26 DIAGNOSIS — J44.9 CHRONIC OBSTRUCTIVE PULMONARY DISEASE, UNSPECIFIED COPD TYPE (H): ICD-10-CM

## 2023-09-26 DIAGNOSIS — I10 ESSENTIAL HYPERTENSION: Chronic | ICD-10-CM

## 2023-09-26 DIAGNOSIS — Z95.5 S/P DRUG ELUTING CORONARY STENT PLACEMENT: Primary | ICD-10-CM

## 2023-09-26 DIAGNOSIS — Z72.0 TOBACCO ABUSE: ICD-10-CM

## 2023-09-26 DIAGNOSIS — E78.2 MIXED HYPERLIPIDEMIA: Chronic | ICD-10-CM

## 2023-09-26 DIAGNOSIS — I25.10 CORONARY ARTERY DISEASE INVOLVING NATIVE CORONARY ARTERY OF NATIVE HEART WITHOUT ANGINA PECTORIS: Chronic | ICD-10-CM

## 2023-09-26 DIAGNOSIS — C61 PROSTATE CANCER (H): ICD-10-CM

## 2023-09-26 DIAGNOSIS — R00.1 BRADYCARDIA: ICD-10-CM

## 2023-09-26 PROCEDURE — 77336 RADIATION PHYSICS CONSULT: CPT | Performed by: RADIOLOGY

## 2023-09-26 PROCEDURE — 77427 RADIATION TX MANAGEMENT X5: CPT | Performed by: RADIOLOGY

## 2023-09-26 PROCEDURE — 77385 HC IMRT TREATMENT DELIVERY, SIMPLE: CPT | Performed by: RADIOLOGY

## 2023-09-26 PROCEDURE — 77014 PR CT GUIDE FOR PLACEMENT RADIATION THERAPY FIELDS: CPT | Mod: 26 | Performed by: RADIOLOGY

## 2023-09-26 PROCEDURE — 99214 OFFICE O/P EST MOD 30 MIN: CPT

## 2023-09-26 NOTE — PATIENT INSTRUCTIONS
Loy Reid,    It was a pleasure to see you today at the [facility].     My recommendations after this visit include:    - No medications changes made today    - Please seek medical attention if you develop recurrent chest pain or shortness of breath or similar symptoms you experienced prior to recent cardiac event    - Take blood pressure daily or every other day    - Please get your lipid level test and liver enzymes (AST/ALT) in 2 months Make sure to fast for 8-12 hours prior to lab work. Okay to have plain water, black coffee or tea without creamer and sugar    - Cardiac rehab as scheduled    - Follow up with Dr. Ochoa    - Please call [phone], if you have any questions or concerns    Mellisa Ferreira PA-C    Medication     Take all your medications as prescribed  Do not stop any medications without talking with a healthcare provider    Exercise      Physical activity is important for overall health  Set a goal of 150 minutes of exercise each week  For example, 30 minutes of exercise 5 days each week.    These 30 minutes can be broken into shorter periods of 15 minutes twice daily or 10 minutes three times daily  Start any exercise program slowly and work towards the goal of 150 minutes each week  For example, you may start with 10 minutes and plan to add a few minutes each week as you get stronger   Examples of exercise include walking, swimming, or biking  Remember to stretch and stay hydrated with exercise    Diet     A heart healthy diet includes:  A variety of fruits and vegetables  Whole grains  Low-fat dairy (fat-free, 1% fat, and low-fat)  Lean meats and poultry without skin   Fish (eat fish 2 times each week)  Nuts  Limit saturated fat to about 13 grams each day (based on a 2000 calorie diet)  Limit red meat  Limit sugars (sweets and sugary beverages)  Limit your portion sizes  Do not add salt to your food when cooking or at the table  Limit alcohol intake (no more  than 1 drink each day for women or 2 drinks each day for men)    Weight Loss     Work on losing weight with diet and exercise  You BMI (body mass index) should be between 18.5-24.9  This is a calculation of your weight and height  Please ask your healthcare provider for your BMI    Manage Other Chronic Health Conditions     Control cholesterol  Eat a diet low in saturated fat  Exercise   Take a statin medication as prescribed  Manage blood pressure  Eat a diet low in sodium  Exercise  Reduce stress  Lose weight   Take blood pressure medications as prescribed  Control blood sugars if diabetic  Monitor sugars and carbohydrates in your diet  Lose weight   Take diabetes medications as prescribed  Follow-up with your primary care provider to make sure your blood sugars are well controlled    Stress Reduction     Find time each day to relax  Reading, listening to music, yoga, meditation, exercise, spending time with friends and family, volunteering   Get 6-8 hours of sleep each night    Smoking Cessation     Smoking causes numerous health problems including coronary artery disease  It is never too late to quit  Set realistic goals for quitting  Decrease the number of cigarettes used each week  Use nicotine gum or patches to help you quit    Information from the [employer].  Please visit their website at www.heart.org

## 2023-09-26 NOTE — PROGRESS NOTES
Assessment/Recommendations   Assessment:    1.  Coronary artery disease: Abnormal MRI stress test leading to coronary angiogram and DESx2 to RCA 90-95% stenosis. Lcx  with left sided collaterals distally. Distant PCI with proximal LAD stent in 2010 with 40% ostial in-stent restenosis.   - On MRI nontransmural myocardial infarction in inferolateral wall from basal to mid segment with  endomyocardial scare 40-50% of wall thickness  - On dual antiplatelet therapy with ASA 81 mg indefinitely and Clopidogrel (Plavix) 75 mg daily for 12 months.  Patient denies any chest discomfort, shortness of breath mildly improved from baseline with COPD  - Cardiac rehab has been scheduled  - Reviewed most recent BMP, Hgb, platelet- stable.    2.  Dyslipidemia with LDL goal <70/Obesity with a BMI of 28.56: Loy Reid is on high intensity statin therapy with rosuvastatin 40 mg. Most recent LDL is 77.    - Estimates only taking rosuvastatin for 1 month, repeat lipid panel in 2 months    3.  Hypertension: His blood pressure is 130/50. Currently on lisinopril 5 mg, metoprolol succinate 50 mg daily  - Instructed to take BP at home daily for future titration needs    4. Tobacco abuse: Quit 9/12/2023. Previously quit 1-2 years. Encouraged continued cessation.    5. Prostate cancer: Receiving treatment currently.    6. Bradycardia: Denies dizziness, lightheadedness, syncope. No changes.  Currently titrating levothyroxine for hypothyroidism with PCP, potentially related.    Plan:  - We discussed the importance of antiplatelet therapy and talking with his cardiologist prior to stopping these medications for any reason.  We discussed about utilization of as needed nitroglycerin.   - Encouraged to seek medical attention if recurrent chest pain or shortness of breath.    - Continue current hypertension regimen.  Instructed patient to start taking daily blood pressure readings at home.    - Continue hyperlipidemia regimen. Fasting  lipid profile check in 2 months weeks. Order placed    - Cardiac rehab as scheduled 10/4    - We discussed a diet low in saturated fat, weight loss, and exercise along with medication for better control of cholesterol.  Highly encouraged to participate in nutrition class in cardiac rehab.  - Risk factor modification and lifestyle management topics were discussed including managing comorbidities, weight loss, heart healthy diet, exercise, and smoking cessation.        Follow up with Dr. Ochoa 12/18     History of Present Illness/Subjective    Mr. Loy Reid is a 73 year old male with a past medical history of CAD with multiple stents, COPD, hyperlipidemia, hypertension, tobacco use, prostate cancer hypothyroidism,  who is seen at [facility] for post coronary intervention follow up. Underwent coronary angiogram with PCI after abnormal NM Lexiscan stress test with inferolateral wall ischemia and nontransmural myocardial infarction in inferolateral wall. DESx2 to 90-95% stenosis of crux to distal RCA before PDA, Lcx  with left sided colaterals filling distally. Most recent MRI Stress test showed an EF of 53%.     Patient feels improved since stent placement.  Is no longer having any chest discomfort, shortness of breath with exertion mildly improved from baseline given COPD.  He has some lower extremity swelling for a year now and may be notices some improvement since procedure.  Was instructed to transition from atorvastatin to rosuvastatin in June by Dr. Ochoa, but did not make this switch until about a month ago.  Does not take his blood pressure regularly at home.    He denies fatigue, lightheadedness, orthopnea, PND, palpitations, and abdominal fullness/bloating.      Coronary Angiogram 9/13/2023 reviewed:    RPAV lesion is 40% stenosed.    Prox Cx to Dist Cx lesion is 100% stenosed.    Mid LAD lesion is 40% stenosed.    Prox RCA to Dist RCA lesion is 90% stenosed.    " Prox LAD lesion is 40% stenosed.    Left ventricular filling pressures are mildly elevated.     1.  Mild in-stent restenosis in proximal one fourth of  mid LAD stent.  Residual lumen appears good.  Mild disease distal to the stent but no severe stenoses.  2.  Total occlusion of left circumflex after small first OM branch.  Occlusion distance appears at least 3 to 4 cm.  Distal vessel fills from left-sided collaterals.  3.  Large dominant RCA with diffuse disease throughout with focal 90 to 95% stenosis distally just before the PDA takeoff.  4.  Successful PCI entire right coronary from crux to proximal segment with 2 long drug-eluting stents.  SAUL-3 flow flow post PCI with 10% residual narrowing.      MRI Stress test 8/31/2023 Reviewed  1.  Pharmacological Regadenoson stress cardiac MRI is positive for inducible myocardial ischemia in  inferolateral wall.  2.  Pharmacological Regadenoson stress ECG is nondiagnostic due to baseline ECG abnormality.  3.  Normal left ventricular size, mild LVH and mild hypokinesis in inferolateral wall.    The calculated left  ventricular ejection fraction is 53%.  4.  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 Rest myocardium is viable.  5.  Normal right ventricular size and systolic function.  6.  No obvious valvular disease.         Physical Examination Review of Systems   /50 (BP Location: Left arm, Patient Position: Sitting, Cuff Size: Adult Regular)   Pulse (!) 48   Resp 16   Ht 1.715 m (5' 7.5\")   Wt 84 kg (185 lb 1.6 oz)   SpO2 97%   BMI 28.56 kg/m    Body mass index is 28.56 kg/m .  Wt Readings from Last 3 Encounters:   09/26/23 84 kg (185 lb 1.6 oz)   09/20/23 84.1 kg (185 lb 8 oz)   09/08/23 82.1 kg (181 lb)     General Appearance:   no distress, normal body habitus   ENT/Mouth: membranes moist, no oral lesions or bleeding gums.      EYES:  no scleral icterus, " normal conjunctivae   Neck: no carotid bruits or thyromegaly   Chest/Lungs:   lungs are clear to auscultation, no rales, intermittent inspiratory wheezing, equal chest wall expansion    Cardiovascular:   Regular. Normal first and second heart sounds with no murmurs, rubs, or gallops; the carotid, radial and posterior tibial pulses are intact,  1-2+ edema bilaterally    Abdomen:  no organomegaly, masses, bruits, or tenderness; bowel sounds are present   Extremities  Puncture Site: no cyanosis or clubbing  Right radial site is soft with minmal bruising.  Radial pulses and Pedal pulses intact and symmetrical.  CMS intact.   Skin: no xanthelasma, warm.    Neurologic: normal  bilateral, no tremors     Psychiatric: alert and oriented x3, calm                                                        Negative unless noted in HPI     Medical History  Surgical History Family History Social History   Past Medical History:   Diagnosis Date    Myocardial infarction (H)     Prostate cancer (H)     PVD (peripheral vascular disease) (H)     Tobacco use     Past Surgical History:   Procedure Laterality Date    CV CORONARY ANGIOGRAM N/A 9/13/2023    Procedure: Coronary Angiogram;  Surgeon: Thony Banda MD;  Location: [facility]    CV LEFT HEART CATH N/A 9/13/2023    Procedure: Left Heart Catheterization;  Surgeon: Thony Banda MD;  Location: [facility]    CV PCI STENT DRUG ELUTING N/A 9/13/2023    Procedure: Percutaneous Coronary Intervention Stent;  Surgeon: Thony Banda MD;  Location: [facility] CV    FOOT FRACTURE SURGERY      Family History   Problem Relation Age of Onset    Cerebrovascular Disease Mother     Coronary Artery Disease Mother     Cerebrovascular Disease Father     Heart Disease Father     Coronary Artery Disease Sister     Heart Disease Brother     Social History     Socioeconomic History    Marital status:      Spouse name: Not on file    Number of children: Not on  file    Years of education: Not on file    Highest education level: Not on file   Occupational History    Not on file   Tobacco Use    Smoking status: Some Days     Types: Cigarettes     Start date: 05/2022    Smokeless tobacco: Never    Tobacco comments:     Has 1 cigarette every so often   Substance and Sexual Activity    Alcohol use: Not on file    Drug use: Not on file    Sexual activity: Not on file   Other Topics Concern    Not on file   Social History Narrative    Not on file     Social Determinants of Health     Financial Resource Strain: Not on file   Food Insecurity: Not on file   Transportation Needs: Not on file   Physical Activity: Not on file   Stress: Not on file   Social Connections: Not on file   Interpersonal Safety: Not on file   Housing Stability: Not on file          Medications  Allergies   Current Outpatient Medications   Medication Sig Dispense Refill    albuterol (PROAIR HFA/PROVENTIL HFA/VENTOLIN HFA) 108 (90 Base) MCG/ACT inhaler Inhale 2 puffs into the lungs every 6 hours as needed for shortness of breath, wheezing or cough 18 g 3    aspirin 81 MG EC tablet Take 81 mg by mouth daily      clopidogrel (PLAVIX) 75 MG tablet Take 1 tablet (75 mg) by mouth daily 90 tablet 3    levothyroxine (SYNTHROID/LEVOTHROID) 112 MCG tablet Take 1 tablet (112 mcg) by mouth daily 90 tablet 3    lisinopril (ZESTRIL) 5 MG tablet Take 1 tablet (5 mg) by mouth daily 90 tablet 3    metoprolol succinate ER (TOPROL XL) 50 MG 24 hr tablet Take 1 tablet (50 mg) by mouth daily 90 tablet 3    nitroGLYcerin (NITROSTAT) 0.3 MG sublingual tablet Place 1 tablet (0.3 mg) under the tongue every 5 minutes as needed for chest pain 20 tablet 3    rosuvastatin (CRESTOR) 40 MG tablet Take 1 tablet (40 mg) by mouth daily 90 tablet 3    No Known Allergies      Lab Results    Chemistry/lipid CBC Cardiac Enzymes/BNP/TSH/INR   Lab Results   Component Value Date    CHOL 132 09/13/2023    HDL 25 (L) 09/13/2023    TRIG 150 (H)  09/13/2023    BUN 16.7 09/13/2023     09/13/2023    CO2 25 09/13/2023    Lab Results   Component Value Date    WBC 11.8 (H) 09/13/2023    HGB 14.4 09/13/2023    HCT 45.3 09/13/2023    MCV 87 09/13/2023     09/13/2023    Lab Results   Component Value Date    TSH 32.70 (H) 09/08/2023              This note has been dictated using voice recognition software. Any grammatical, typographical, or context distortions are unintentional and inherent to the software    Mellisa Ferreira PA-C

## 2023-09-26 NOTE — LETTER
9/26/2023    CUATE KING MD  [address] [place] [address]    RE: Loy Reid       Dear Colleague,     I had the pleasure of seeing Loy Reid in the [facility].          Assessment/Recommendations   Assessment:    1.  Coronary artery disease: Abnormal MRI stress test leading to coronary angiogram and DESx2 to RCA 90-95% stenosis. Lcx  with left sided collaterals distally. Distant PCI with proximal LAD stent in 2010 with 40% ostial in-stent restenosis.   - On MRI nontransmural myocardial infarction in inferolateral wall from basal to mid segment with  endomyocardial scare 40-50% of wall thickness  - On dual antiplatelet therapy with ASA 81 mg indefinitely and Clopidogrel (Plavix) 75 mg daily for 12 months.  Patient denies any chest discomfort, shortness of breath mildly improved from baseline with COPD  - Cardiac rehab has been scheduled  - Reviewed most recent BMP, Hgb, platelet- stable.    2.  Dyslipidemia with LDL goal <70/Obesity with a BMI of 28.56: Loy Reid is on high intensity statin therapy with rosuvastatin 40 mg. Most recent LDL is 77.    - Estimates only taking rosuvastatin for 1 month, repeat lipid panel in 2 months    3.  Hypertension: His blood pressure is 130/50. Currently on lisinopril 5 mg, metoprolol succinate 50 mg daily  - Instructed to take BP at home daily for future titration needs    4. Tobacco abuse: Quit 9/12/2023. Previously quit 1-2 years. Encouraged continued cessation.    5. Prostate cancer: Receiving treatment currently.    6. Bradycardia: Denies dizziness, lightheadedness, syncope. No changes.  Currently titrating levothyroxine for hypothyroidism with PCP, potentially related.    Plan:  - We discussed the importance of antiplatelet therapy and talking with his cardiologist prior to stopping these medications for any reason.  We discussed about utilization of as needed nitroglycerin.   - Encouraged to seek medical attention if recurrent chest  pain or shortness of breath.    - Continue current hypertension regimen.  Instructed patient to start taking daily blood pressure readings at home.    - Continue hyperlipidemia regimen. Fasting lipid profile check in 2 months weeks. Order placed    - Cardiac rehab as scheduled 10/4    - We discussed a diet low in saturated fat, weight loss, and exercise along with medication for better control of cholesterol.  Highly encouraged to participate in nutrition class in cardiac rehab.  - Risk factor modification and lifestyle management topics were discussed including managing comorbidities, weight loss, heart healthy diet, exercise, and smoking cessation.        Follow up with Dr. Ochoa 12/18     History of Present Illness/Subjective    Mr. Loy Reid is a 73 year old male with a past medical history of CAD with multiple stents, COPD, hyperlipidemia, hypertension, tobacco use, prostate cancer hypothyroidism,  who is seen at [facility] for post coronary intervention follow up. Underwent coronary angiogram with PCI after abnormal NM Lexiscan stress test with inferolateral wall ischemia and nontransmural myocardial infarction in inferolateral wall. DESx2 to 90-95% stenosis of crux to distal RCA before PDA, Lcx  with left sided colaterals filling distally. Most recent MRI Stress test showed an EF of 53%.     Patient feels improved since stent placement.  Is no longer having any chest discomfort, shortness of breath with exertion mildly improved from baseline given COPD.  He has some lower extremity swelling for a year now and may be notices some improvement since procedure.  Was instructed to transition from atorvastatin to rosuvastatin in June by Dr. Ochoa, but did not make this switch until about a month ago.  Does not take his blood pressure regularly at home.    He denies fatigue, lightheadedness, orthopnea, PND, palpitations, and abdominal fullness/bloating.      Coronary  "Angiogram 9/13/2023 reviewed:    RPAV lesion is 40% stenosed.    Prox Cx to Dist Cx lesion is 100% stenosed.    Mid LAD lesion is 40% stenosed.    Prox RCA to Dist RCA lesion is 90% stenosed.    Prox LAD lesion is 40% stenosed.    Left ventricular filling pressures are mildly elevated.     1.  Mild in-stent restenosis in proximal one fourth of  mid LAD stent.  Residual lumen appears good.  Mild disease distal to the stent but no severe stenoses.  2.  Total occlusion of left circumflex after small first OM branch.  Occlusion distance appears at least 3 to 4 cm.  Distal vessel fills from left-sided collaterals.  3.  Large dominant RCA with diffuse disease throughout with focal 90 to 95% stenosis distally just before the PDA takeoff.  4.  Successful PCI entire right coronary from crux to proximal segment with 2 long drug-eluting stents.  SAUL-3 flow flow post PCI with 10% residual narrowing.      MRI Stress test 8/31/2023 Reviewed  1.  Pharmacological Regadenoson stress cardiac MRI is positive for inducible myocardial ischemia in  inferolateral wall.  2.  Pharmacological Regadenoson stress ECG is nondiagnostic due to baseline ECG abnormality.  3.  Normal left ventricular size, mild LVH and mild hypokinesis in inferolateral wall.    The calculated left  ventricular ejection fraction is 53%.  4.  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 Rest myocardium is viable.  5.  Normal right ventricular size and systolic function.  6.  No obvious valvular disease.         Physical Examination Review of Systems   /50 (BP Location: Left arm, Patient Position: Sitting, Cuff Size: Adult Regular)   Pulse (!) 48   Resp 16   Ht 1.715 m (5' 7.5\")   Wt 84 kg (185 lb 1.6 oz)   SpO2 97%   BMI 28.56 kg/m    Body mass index is 28.56 kg/m .  Wt Readings from Last 3 Encounters:   09/26/23 84 kg (185 lb 1.6 oz)   09/20/23 84.1 kg " (185 lb 8 oz)   09/08/23 82.1 kg (181 lb)     General Appearance:   no distress, normal body habitus   ENT/Mouth: membranes moist, no oral lesions or bleeding gums.      EYES:  no scleral icterus, normal conjunctivae   Neck: no carotid bruits or thyromegaly   Chest/Lungs:   lungs are clear to auscultation, no rales, intermittent inspiratory wheezing, equal chest wall expansion    Cardiovascular:   Regular. Normal first and second heart sounds with no murmurs, rubs, or gallops; the carotid, radial and posterior tibial pulses are intact,  1-2+ edema bilaterally    Abdomen:  no organomegaly, masses, bruits, or tenderness; bowel sounds are present   Extremities  Puncture Site: no cyanosis or clubbing  Right radial site is soft with minmal bruising.  Radial pulses and Pedal pulses intact and symmetrical.  CMS intact.   Skin: no xanthelasma, warm.    Neurologic: normal  bilateral, no tremors     Psychiatric: alert and oriented x3, calm                                                        Negative unless noted in HPI     Medical History  Surgical History Family History Social History   Past Medical History:   Diagnosis Date    Myocardial infarction (H)     Prostate cancer (H)     PVD (peripheral vascular disease) (H)     Tobacco use     Past Surgical History:   Procedure Laterality Date    CV CORONARY ANGIOGRAM N/A 9/13/2023    Procedure: Coronary Angiogram;  Surgeon: Thony Banda MD;  Location: [facility]    CV LEFT HEART CATH N/A 9/13/2023    Procedure: Left Heart Catheterization;  Surgeon: Thony Banda MD;  Location: [facility]    CV PCI STENT DRUG ELUTING N/A 9/13/2023    Procedure: Percutaneous Coronary Intervention Stent;  Surgeon: Thony Banda MD;  Location: [facility]    FOOT FRACTURE SURGERY      Family History   Problem Relation Age of Onset    Cerebrovascular Disease Mother     Coronary Artery Disease Mother     Cerebrovascular Disease Father     Heart Disease  Father     Coronary Artery Disease Sister     Heart Disease Brother     Social History     Socioeconomic History    Marital status:      Spouse name: Not on file    Number of children: Not on file    Years of education: Not on file    Highest education level: Not on file   Occupational History    Not on file   Tobacco Use    Smoking status: Some Days     Types: Cigarettes     Start date: 05/2022    Smokeless tobacco: Never    Tobacco comments:     Has 1 cigarette every so often   Substance and Sexual Activity    Alcohol use: Not on file    Drug use: Not on file    Sexual activity: Not on file   Other Topics Concern    Not on file   Social History Narrative    Not on file     Social Determinants of Health     Financial Resource Strain: Not on file   Food Insecurity: Not on file   Transportation Needs: Not on file   Physical Activity: Not on file   Stress: Not on file   Social Connections: Not on file   Interpersonal Safety: Not on file   Housing Stability: Not on file          Medications  Allergies   Current Outpatient Medications   Medication Sig Dispense Refill    albuterol (PROAIR HFA/PROVENTIL HFA/VENTOLIN HFA) 108 (90 Base) MCG/ACT inhaler Inhale 2 puffs into the lungs every 6 hours as needed for shortness of breath, wheezing or cough 18 g 3    aspirin 81 MG EC tablet Take 81 mg by mouth daily      clopidogrel (PLAVIX) 75 MG tablet Take 1 tablet (75 mg) by mouth daily 90 tablet 3    levothyroxine (SYNTHROID/LEVOTHROID) 112 MCG tablet Take 1 tablet (112 mcg) by mouth daily 90 tablet 3    lisinopril (ZESTRIL) 5 MG tablet Take 1 tablet (5 mg) by mouth daily 90 tablet 3    metoprolol succinate ER (TOPROL XL) 50 MG 24 hr tablet Take 1 tablet (50 mg) by mouth daily 90 tablet 3    nitroGLYcerin (NITROSTAT) 0.3 MG sublingual tablet Place 1 tablet (0.3 mg) under the tongue every 5 minutes as needed for chest pain 20 tablet 3    rosuvastatin (CRESTOR) 40 MG tablet Take 1 tablet (40 mg) by mouth daily 90 tablet 3     No Known Allergies      Lab Results    Chemistry/lipid CBC Cardiac Enzymes/BNP/TSH/INR   Lab Results   Component Value Date    CHOL 132 09/13/2023    HDL 25 (L) 09/13/2023    TRIG 150 (H) 09/13/2023    BUN 16.7 09/13/2023     09/13/2023    CO2 25 09/13/2023    Lab Results   Component Value Date    WBC 11.8 (H) 09/13/2023    HGB 14.4 09/13/2023    HCT 45.3 09/13/2023    MCV 87 09/13/2023     09/13/2023    Lab Results   Component Value Date    TSH 32.70 (H) 09/08/2023              This note has been dictated using voice recognition software. Any grammatical, typographical, or context distortions are unintentional and inherent to the software    Mellisa Ferreira PA-C      Thank you for allowing me to participate in the care of your patient.      Sincerely,     Mellisa Kenyon PA-C     [facility]  cc:   No referring provider defined for this encounter.

## 2023-09-27 ENCOUNTER — OFFICE VISIT (OUTPATIENT)
Dept: RADIATION ONCOLOGY | Facility: CLINIC | Age: 73
End: 2023-09-27
Attending: RADIOLOGY
Payer: MEDICARE

## 2023-09-27 VITALS
BODY MASS INDEX: 28.61 KG/M2 | WEIGHT: 185.4 LBS | HEART RATE: 50 BPM | TEMPERATURE: 98.7 F | SYSTOLIC BLOOD PRESSURE: 151 MMHG | RESPIRATION RATE: 16 BRPM | OXYGEN SATURATION: 98 % | DIASTOLIC BLOOD PRESSURE: 74 MMHG

## 2023-09-27 DIAGNOSIS — C61 PROSTATE CANCER (H): Primary | ICD-10-CM

## 2023-09-27 PROCEDURE — 77385 HC IMRT TREATMENT DELIVERY, SIMPLE: CPT | Performed by: RADIOLOGY

## 2023-09-27 PROCEDURE — 77014 PR CT GUIDE FOR PLACEMENT RADIATION THERAPY FIELDS: CPT | Mod: 26 | Performed by: RADIOLOGY

## 2023-09-27 NOTE — LETTER
2023         RE: Loy Reid  [address] [address]        Dear Colleague,    Thank you for referring your patient, Loy Reid, to the [facility] RADIATION ONCOLOGY [place]. Please see a copy of my visit note below.    RADIATION ONCOLOGY WEEKLY TREATMENT VISIT NOTE      Assessment / Impression     Prostate cancer (H) [C61]     Tolerating radiation therapy well.  All questions and concerns addressed.    Plan:     Continue radiation treatment as prescribed.    Subjective:      HPI: Loy Reid is a 73 year old male with  Prostate cancer (H) [C61]    The following portions of the patient's history were reviewed and updated as appropriate: allergies, current medications, past family history, past medical history, past social history, past surgical history and problem list.    Assessment                  Body Site:  Pelvis  Stereotactic Radiosurgery: No  Today's Dose: 1620  Total Dose for Pelvis: 7020  Today's Fraction/Total Fraction Pelvis:   Diarrhea W/O Colostomy: 0: None  Constipation: 0: None  Proctitis: 0: None  Urinary frequency and/or urgency: 1: Increase in frequency or nocturia up to two times normal  Urinary Retention: 1: Hesitancy or dribbling. No sig residual, retention during immediate post op  Urinary Incontinence: 1: Stress incontinence  Dysuria: 0: None  Nocturia: 2-3  Urine Color Change: 0: Normal                    Was an  used for assessment: No              Sexuality Alteration                    Emotional Alteration    Copin: Effective  Comfort Alteration   KPS: 100 % Normal, no complaints  Fatigue (ONS scale): 0: No Fatigue;2: Mild Fatigue   Nutrition Alteration   Anorexia: 0: None  Nausea: 0: None  Vomitin: None  Weight: 84.1 kg (185 lb 6.4 oz)  Skin Alteration   Skin Sensation: 0: No problem  Skin Reaction: 0: None  AUA Assessment                                           Accompanied by       Objective:     Exam:  Examination reviewed no significant changes.    Vitals:    09/27/23 0931   BP: (!) 151/74   Pulse: 50   Resp: 16   Temp: 98.7  F (37.1  C)   TempSrc: Oral   SpO2: 98%   Weight: 84.1 kg (185 lb 6.4 oz)       Wt Readings from Last 8 Encounters:   09/27/23 84.1 kg (185 lb 6.4 oz)   09/26/23 84 kg (185 lb 1.6 oz)   09/20/23 84.1 kg (185 lb 8 oz)   09/08/23 82.1 kg (181 lb)   08/29/23 83.6 kg (184 lb 6.4 oz)   07/18/23 85.1 kg (187 lb 11.2 oz)   06/27/23 85.7 kg (189 lb)   06/08/23 85.3 kg (188 lb)       General: Alert and oriented, in no acute distress  Loy has no Erythema.  Aria chart and setup information reviewed    Lacey Young MD      Again, thank you for allowing me to participate in the care of your patient.        Sincerely,        Lacey Young MD

## 2023-09-27 NOTE — PROGRESS NOTES
RADIATION ONCOLOGY WEEKLY TREATMENT VISIT NOTE      Assessment / Impression     Prostate cancer (H) [C61]     Tolerating radiation therapy well.  All questions and concerns addressed.    Plan:     Continue radiation treatment as prescribed.    Subjective:      HPI: Loy Reid is a 73 year old male with  Prostate cancer (H) [C61]    The following portions of the patient's history were reviewed and updated as appropriate: allergies, current medications, past family history, past medical history, past social history, past surgical history and problem list.    Assessment                  Body Site:  Pelvis  Stereotactic Radiosurgery: No  Today's Dose: 1620  Total Dose for Pelvis: 7020  Today's Fraction/Total Fraction Pelvis:   Diarrhea W/O Colostomy: 0: None  Constipation: 0: None  Proctitis: 0: None  Urinary frequency and/or urgency: 1: Increase in frequency or nocturia up to two times normal  Urinary Retention: 1: Hesitancy or dribbling. No sig residual, retention during immediate post op  Urinary Incontinence: 1: Stress incontinence  Dysuria: 0: None  Nocturia: 2-3  Urine Color Change: 0: Normal                    Was an  used for assessment: No              Sexuality Alteration                    Emotional Alteration    Copin: Effective  Comfort Alteration   KPS: 100 % Normal, no complaints  Fatigue (ONS scale): 0: No Fatigue;2: Mild Fatigue   Nutrition Alteration   Anorexia: 0: None  Nausea: 0: None  Vomitin: None  Weight: 84.1 kg (185 lb 6.4 oz)  Skin Alteration   Skin Sensation: 0: No problem  Skin Reaction: 0: None  AUA Assessment                                           Accompanied by       Objective:     Exam: Examination reviewed no significant changes.    Vitals:    23 0931   BP: (!) 151/74   Pulse: 50   Resp: 16   Temp: 98.7  F (37.1  C)   TempSrc: Oral   SpO2: 98%   Weight: 84.1 kg (185 lb 6.4 oz)       Wt Readings from Last 8 Encounters:   23 84.1 kg (185  lb 6.4 oz)   09/26/23 84 kg (185 lb 1.6 oz)   09/20/23 84.1 kg (185 lb 8 oz)   09/08/23 82.1 kg (181 lb)   08/29/23 83.6 kg (184 lb 6.4 oz)   07/18/23 85.1 kg (187 lb 11.2 oz)   06/27/23 85.7 kg (189 lb)   06/08/23 85.3 kg (188 lb)       General: Alert and oriented, in no acute distress  Loy has no Erythema.  Aria chart and setup information reviewed    Lacey Young MD

## 2023-09-28 ENCOUNTER — APPOINTMENT (OUTPATIENT)
Dept: RADIATION ONCOLOGY | Facility: CLINIC | Age: 73
End: 2023-09-28
Attending: RADIOLOGY
Payer: MEDICARE

## 2023-09-28 PROCEDURE — 77014 PR CT GUIDE FOR PLACEMENT RADIATION THERAPY FIELDS: CPT | Mod: 26 | Performed by: STUDENT IN AN ORGANIZED HEALTH CARE EDUCATION/TRAINING PROGRAM

## 2023-09-28 PROCEDURE — 77385 HC IMRT TREATMENT DELIVERY, SIMPLE: CPT | Performed by: STUDENT IN AN ORGANIZED HEALTH CARE EDUCATION/TRAINING PROGRAM

## 2023-09-29 ENCOUNTER — APPOINTMENT (OUTPATIENT)
Dept: RADIATION ONCOLOGY | Facility: CLINIC | Age: 73
End: 2023-09-29
Attending: RADIOLOGY
Payer: MEDICARE

## 2023-09-29 PROCEDURE — 77385 HC IMRT TREATMENT DELIVERY, SIMPLE: CPT | Performed by: STUDENT IN AN ORGANIZED HEALTH CARE EDUCATION/TRAINING PROGRAM

## 2023-09-29 PROCEDURE — 77014 PR CT GUIDE FOR PLACEMENT RADIATION THERAPY FIELDS: CPT | Mod: 26 | Performed by: STUDENT IN AN ORGANIZED HEALTH CARE EDUCATION/TRAINING PROGRAM

## 2023-10-02 ENCOUNTER — APPOINTMENT (OUTPATIENT)
Dept: RADIATION ONCOLOGY | Facility: CLINIC | Age: 73
End: 2023-10-02
Attending: RADIOLOGY
Payer: MEDICARE

## 2023-10-02 PROCEDURE — 77014 PR CT GUIDE FOR PLACEMENT RADIATION THERAPY FIELDS: CPT | Mod: 26 | Performed by: RADIOLOGY

## 2023-10-02 PROCEDURE — 77385 HC IMRT TREATMENT DELIVERY, SIMPLE: CPT | Performed by: RADIOLOGY

## 2023-10-03 ENCOUNTER — APPOINTMENT (OUTPATIENT)
Dept: RADIATION ONCOLOGY | Facility: CLINIC | Age: 73
End: 2023-10-03
Attending: RADIOLOGY
Payer: MEDICARE

## 2023-10-03 PROCEDURE — 77014 PR CT GUIDE FOR PLACEMENT RADIATION THERAPY FIELDS: CPT | Mod: 26 | Performed by: STUDENT IN AN ORGANIZED HEALTH CARE EDUCATION/TRAINING PROGRAM

## 2023-10-03 PROCEDURE — 77427 RADIATION TX MANAGEMENT X5: CPT | Performed by: RADIOLOGY

## 2023-10-03 PROCEDURE — 77336 RADIATION PHYSICS CONSULT: CPT | Performed by: RADIOLOGY

## 2023-10-03 PROCEDURE — 77385 HC IMRT TREATMENT DELIVERY, SIMPLE: CPT | Performed by: STUDENT IN AN ORGANIZED HEALTH CARE EDUCATION/TRAINING PROGRAM

## 2023-10-04 ENCOUNTER — HOSPITAL ENCOUNTER (OUTPATIENT)
Dept: CARDIAC REHAB | Facility: CLINIC | Age: 73
Discharge: HOME | End: 2023-10-04
Attending: NURSE PRACTITIONER
Payer: MEDICARE

## 2023-10-04 ENCOUNTER — APPOINTMENT (OUTPATIENT)
Dept: RADIATION ONCOLOGY | Facility: CLINIC | Age: 73
End: 2023-10-04
Attending: RADIOLOGY
Payer: MEDICARE

## 2023-10-04 VITALS
RESPIRATION RATE: 20 BRPM | HEART RATE: 52 BPM | TEMPERATURE: 97.8 F | SYSTOLIC BLOOD PRESSURE: 166 MMHG | WEIGHT: 184.7 LBS | DIASTOLIC BLOOD PRESSURE: 74 MMHG | OXYGEN SATURATION: 98 % | BODY MASS INDEX: 28.5 KG/M2

## 2023-10-04 DIAGNOSIS — Z95.5 S/P CORONARY ARTERY STENT PLACEMENT: ICD-10-CM

## 2023-10-04 DIAGNOSIS — C61 PROSTATE CANCER (H): Primary | ICD-10-CM

## 2023-10-04 PROCEDURE — 77014 PR CT GUIDE FOR PLACEMENT RADIATION THERAPY FIELDS: CPT | Mod: 26 | Performed by: STUDENT IN AN ORGANIZED HEALTH CARE EDUCATION/TRAINING PROGRAM

## 2023-10-04 PROCEDURE — 93797 PHYS/QHP OP CAR RHAB WO ECG: CPT

## 2023-10-04 PROCEDURE — 77385 HC IMRT TREATMENT DELIVERY, SIMPLE: CPT | Performed by: STUDENT IN AN ORGANIZED HEALTH CARE EDUCATION/TRAINING PROGRAM

## 2023-10-04 PROCEDURE — 93798 PHYS/QHP OP CAR RHAB W/ECG: CPT

## 2023-10-04 ASSESSMENT — PAIN SCALES - GENERAL: PAINLEVEL: NO PAIN (0)

## 2023-10-04 NOTE — LETTER
10/4/2023         RE: Loy Reid  [address] [address]        Dear Colleague,    Thank you for referring your patient, Loy Reid, to the [facility] RADIATION ONCOLOGY [place]. Please see a copy of my visit note below.    RADIATION ONCOLOGY WEEKLY TREATMENT VISIT NOTE      Assessment / Impression     Prostate cancer (H) [C61]     Cancer Staging   No matching staging information was found for the patient.     Tolerating radiation therapy well.  All questions and concerns addressed.  Grade 1 radiation cystitis    Plan:     Continue radiation treatment as prescribed.  Radiation:   Site: Prostate  Stereotactic Radiosurgery: No  Today's Dose: 2970  Total Dose for Pelvis: 7020  Today's Fraction/Total Fraction Pelvis: 11/26        Subjective:      HPI: Loy Reid is a 73 year old male with  Prostate cancer (H) [C61]    He is tolerating radiation therapy well.  He notices increase in urinary frequency and nocturia up to 3-4 times per night (previous 2).  No dysuria.  Minimal alteration in bowels slightly softer but no diarrhea.  No significant fatigue.    The following portions of the patient's history were reviewed and updated as appropriate: allergies, current medications, past family history, past medical history, past social history, past surgical history and problem list.    Assessment                  Body Site:  Pelvis  Stereotactic Radiosurgery: No  Today's Dose: 2970  Total Dose for Pelvis: 7020  Today's Fraction/Total Fraction Pelvis: 11/26  Diarrhea W/O Colostomy: 0: None  Constipation: 0: None  Proctitis: 0: None  Urinary frequency and/or urgency: 1: Increase in frequency or nocturia up to two times normal  Urinary Retention: 1: Hesitancy or dribbling. No sig residual, retention during immediate post op  Urinary Incontinence: 0: None  Dysuria: 0: None  Nocturia: 3  Urine Color Change: 0: Normal                    Was an  used for assessment:  No              Sexuality Alteration                    Emotional Alteration    Copin: Effective  Comfort Alteration   KPS: 100 % Normal, no complaints  Fatigue (ONS scale): 2: Mild Fatigue  Pain Location: 0   Nutrition Alteration   Anorexia: 0: None  Nausea: 0: None  Vomitin: None  Weight: 83.8 kg (184 lb 11.2 oz)  Skin Alteration   Skin Sensation: 0: No problem  Skin Reaction: 0: None  AUA Assessment                                           Accompanied by       Objective:     Exam:     Vitals:    10/04/23 0945   BP: (!) 166/74   Pulse: 52   Resp: 20   Temp: 97.8  F (36.6  C)   TempSrc: Oral   SpO2: 98%   Weight: 83.8 kg (184 lb 11.2 oz)   PainSc: No Pain (0)       Wt Readings from Last 8 Encounters:   10/04/23 83.8 kg (184 lb 11.2 oz)   23 84.1 kg (185 lb 6.4 oz)   23 84 kg (185 lb 1.6 oz)   23 84.1 kg (185 lb 8 oz)   23 82.1 kg (181 lb)   23 83.6 kg (184 lb 6.4 oz)   23 85.1 kg (187 lb 11.2 oz)   23 85.7 kg (189 lb)       General: Alert and oriented, in no acute distress  Loy has no Erythema.    Treatment Summary to Date    Aria chart and setup information reviewed    Emily Ryan MD      Again, thank you for allowing me to participate in the care of your patient.        Sincerely,        Emily Ryan MD

## 2023-10-04 NOTE — PROGRESS NOTES
RADIATION ONCOLOGY WEEKLY TREATMENT VISIT NOTE      Assessment / Impression     Prostate cancer (H) [C61]     Cancer Staging   No matching staging information was found for the patient.     Tolerating radiation therapy well.  All questions and concerns addressed.  Grade 1 radiation cystitis    Plan:     Continue radiation treatment as prescribed.  Radiation:   Site: Prostate  Stereotactic Radiosurgery: No  Today's Dose: 2970  Total Dose for Pelvis: 7020  Today's Fraction/Total Fraction Pelvis:         Subjective:      HPI: Loy Reid is a 73 year old male with  Prostate cancer (H) [C61]    He is tolerating radiation therapy well.  He notices increase in urinary frequency and nocturia up to 3-4 times per night (previous 2).  No dysuria.  Minimal alteration in bowels slightly softer but no diarrhea.  No significant fatigue.    The following portions of the patient's history were reviewed and updated as appropriate: allergies, current medications, past family history, past medical history, past social history, past surgical history and problem list.    Assessment                  Body Site:  Pelvis  Stereotactic Radiosurgery: No  Today's Dose: 2970  Total Dose for Pelvis: 7020  Today's Fraction/Total Fraction Pelvis:   Diarrhea W/O Colostomy: 0: None  Constipation: 0: None  Proctitis: 0: None  Urinary frequency and/or urgency: 1: Increase in frequency or nocturia up to two times normal  Urinary Retention: 1: Hesitancy or dribbling. No sig residual, retention during immediate post op  Urinary Incontinence: 0: None  Dysuria: 0: None  Nocturia: 3  Urine Color Change: 0: Normal                    Was an  used for assessment: No              Sexuality Alteration                    Emotional Alteration    Copin: Effective  Comfort Alteration   KPS: 100 % Normal, no complaints  Fatigue (ONS scale): 2: Mild Fatigue  Pain Location: 0   Nutrition Alteration   Anorexia: 0: None  Nausea: 0:  None  Vomitin: None  Weight: 83.8 kg (184 lb 11.2 oz)  Skin Alteration   Skin Sensation: 0: No problem  Skin Reaction: 0: None  AUA Assessment                                           Accompanied by       Objective:     Exam:     Vitals:    10/04/23 0945   BP: (!) 166/74   Pulse: 52   Resp: 20   Temp: 97.8  F (36.6  C)   TempSrc: Oral   SpO2: 98%   Weight: 83.8 kg (184 lb 11.2 oz)   PainSc: No Pain (0)       Wt Readings from Last 8 Encounters:   10/04/23 83.8 kg (184 lb 11.2 oz)   23 84.1 kg (185 lb 6.4 oz)   23 84 kg (185 lb 1.6 oz)   23 84.1 kg (185 lb 8 oz)   23 82.1 kg (181 lb)   23 83.6 kg (184 lb 6.4 oz)   23 85.1 kg (187 lb 11.2 oz)   23 85.7 kg (189 lb)       General: Alert and oriented, in no acute distress  Loy has no Erythema.    Treatment Summary to Date    Aria chart and setup information reviewed    Emily Ryan MD

## 2023-10-05 ENCOUNTER — APPOINTMENT (OUTPATIENT)
Dept: RADIATION ONCOLOGY | Facility: CLINIC | Age: 73
End: 2023-10-05
Attending: RADIOLOGY
Payer: MEDICARE

## 2023-10-05 PROCEDURE — 77385 HC IMRT TREATMENT DELIVERY, SIMPLE: CPT | Performed by: STUDENT IN AN ORGANIZED HEALTH CARE EDUCATION/TRAINING PROGRAM

## 2023-10-05 PROCEDURE — 77014 PR CT GUIDE FOR PLACEMENT RADIATION THERAPY FIELDS: CPT | Mod: 26 | Performed by: STUDENT IN AN ORGANIZED HEALTH CARE EDUCATION/TRAINING PROGRAM

## 2023-10-06 ENCOUNTER — APPOINTMENT (OUTPATIENT)
Dept: RADIATION ONCOLOGY | Facility: CLINIC | Age: 73
End: 2023-10-06
Attending: RADIOLOGY
Payer: MEDICARE

## 2023-10-06 PROCEDURE — 77014 PR CT GUIDE FOR PLACEMENT RADIATION THERAPY FIELDS: CPT | Mod: 26 | Performed by: STUDENT IN AN ORGANIZED HEALTH CARE EDUCATION/TRAINING PROGRAM

## 2023-10-06 PROCEDURE — 77385 HC IMRT TREATMENT DELIVERY, SIMPLE: CPT | Performed by: STUDENT IN AN ORGANIZED HEALTH CARE EDUCATION/TRAINING PROGRAM

## 2023-10-09 ENCOUNTER — APPOINTMENT (OUTPATIENT)
Dept: RADIATION ONCOLOGY | Facility: CLINIC | Age: 73
End: 2023-10-09
Attending: RADIOLOGY
Payer: MEDICARE

## 2023-10-09 PROCEDURE — 77385 HC IMRT TREATMENT DELIVERY, SIMPLE: CPT | Performed by: RADIOLOGY

## 2023-10-09 PROCEDURE — 77014 PR CT GUIDE FOR PLACEMENT RADIATION THERAPY FIELDS: CPT | Mod: 26 | Performed by: RADIOLOGY

## 2023-10-10 ENCOUNTER — APPOINTMENT (OUTPATIENT)
Dept: RADIATION ONCOLOGY | Facility: CLINIC | Age: 73
End: 2023-10-10
Attending: RADIOLOGY
Payer: MEDICARE

## 2023-10-10 PROCEDURE — 77014 PR CT GUIDE FOR PLACEMENT RADIATION THERAPY FIELDS: CPT | Mod: 26 | Performed by: RADIOLOGY

## 2023-10-10 PROCEDURE — 77336 RADIATION PHYSICS CONSULT: CPT | Performed by: RADIOLOGY

## 2023-10-10 PROCEDURE — 77427 RADIATION TX MANAGEMENT X5: CPT | Performed by: STUDENT IN AN ORGANIZED HEALTH CARE EDUCATION/TRAINING PROGRAM

## 2023-10-10 PROCEDURE — 77385 HC IMRT TREATMENT DELIVERY, SIMPLE: CPT | Performed by: RADIOLOGY

## 2023-10-11 ENCOUNTER — OFFICE VISIT (OUTPATIENT)
Dept: RADIATION ONCOLOGY | Facility: CLINIC | Age: 73
End: 2023-10-11
Attending: RADIOLOGY
Payer: MEDICARE

## 2023-10-11 VITALS
HEART RATE: 51 BPM | RESPIRATION RATE: 16 BRPM | BODY MASS INDEX: 28.38 KG/M2 | TEMPERATURE: 97.5 F | OXYGEN SATURATION: 99 % | SYSTOLIC BLOOD PRESSURE: 139 MMHG | DIASTOLIC BLOOD PRESSURE: 65 MMHG | WEIGHT: 183.9 LBS

## 2023-10-11 DIAGNOSIS — C61 PROSTATE CANCER (H): Primary | ICD-10-CM

## 2023-10-11 PROCEDURE — 77385 HC IMRT TREATMENT DELIVERY, SIMPLE: CPT | Performed by: RADIOLOGY

## 2023-10-11 PROCEDURE — 77014 PR CT GUIDE FOR PLACEMENT RADIATION THERAPY FIELDS: CPT | Mod: 26 | Performed by: RADIOLOGY

## 2023-10-11 RX ORDER — TAMSULOSIN HYDROCHLORIDE 0.4 MG/1
0.4 CAPSULE ORAL DAILY
Qty: 60 CAPSULE | Refills: 1 | Status: SHIPPED | OUTPATIENT
Start: 2023-10-11

## 2023-10-11 ASSESSMENT — PAIN SCALES - GENERAL: PAINLEVEL: NO PAIN (0)

## 2023-10-11 NOTE — LETTER
10/11/2023         RE: Loy Reid  [address] [facility] [address]        Dear Colleague,    Thank you for referring your patient, Loy Reid, to the [facility] RADIATION ONCOLOGY [place]. Please see a copy of my visit note below.    RADIATION ONCOLOGY WEEKLY TREATMENT VISIT NOTE      Assessment / Impression     Prostate cancer (H) [C61]    Tolerating radiation therapy well.  All questions and concerns addressed.    Plan:     Continue radiation treatment as prescribed.    Patient presented with new symptoms of dysuria including frequency and hesitancy over the past few days.  I will prescribe Flomax for symptom control.    Subjective:      HPI: Loy Reid is a 73 year old male with  Prostate cancer (H) [C61]    The following portions of the patient's history were reviewed and updated as appropriate: allergies, current medications, past family history, past medical history, past social history, past surgical history and problem list.    Assessment                  Body Site:  Pelvis  Stereotactic Radiosurgery: No  Today's Dose: 4320  Total Dose for Pelvis: 7020  Today's Fraction/Total Fraction Pelvis:   Diarrhea W/O Colostomy: 0: None  Constipation: 0: None  Proctitis: 0: None  Urinary frequency and/or urgency: 2: Increase more than two times normul but less than hourly  Urinary Retention: 1: Hesitancy or dribbling. No sig residual, retention during immediate post op  Urinary Incontinence: 0: None  Dysuria: 1: Mild symptoms requiring no intervention  Nocturia: 4  Urine Color Change: 0: Normal                    Was an  used for assessment: No              Sexuality Alteration                    Emotional Alteration    Copin: Effective  Comfort Alteration   KPS: 100 % Normal, no complaints  Fatigue (ONS scale): 2: Mild Fatigue   Nutrition Alteration   Anorexia: 0: None  Nausea: 0: None  Vomitin: None  Weight: 83.4 kg (183 lb 14.4 oz)  Skin  Alteration   Skin Sensation: 0: No problem  Skin Reaction: 0: None  AUA Assessment                                           Accompanied by       Objective:     Exam: Examination reviewed no significant changes.    Vitals:    10/11/23 0945   BP: 139/65   Pulse: 51   Resp: 16   Temp: 97.5  F (36.4  C)   TempSrc: Oral   SpO2: 99%   Weight: 83.4 kg (183 lb 14.4 oz)   PainSc: No Pain (0)       Wt Readings from Last 8 Encounters:   10/11/23 83.4 kg (183 lb 14.4 oz)   10/04/23 83.8 kg (184 lb 11.2 oz)   09/27/23 84.1 kg (185 lb 6.4 oz)   09/26/23 84 kg (185 lb 1.6 oz)   09/20/23 84.1 kg (185 lb 8 oz)   09/08/23 82.1 kg (181 lb)   08/29/23 83.6 kg (184 lb 6.4 oz)   07/18/23 85.1 kg (187 lb 11.2 oz)       General: Alert and oriented, in no acute distress  Loy has no Erythema.  Aria chart and setup information reviewed    Lacey Young MD      Again, thank you for allowing me to participate in the care of your patient.        Sincerely,        Lacey Young MD

## 2023-10-11 NOTE — PROGRESS NOTES
RADIATION ONCOLOGY WEEKLY TREATMENT VISIT NOTE      Assessment / Impression     Prostate cancer (H) [C61]    Tolerating radiation therapy well.  All questions and concerns addressed.    Plan:     Continue radiation treatment as prescribed.    Patient presented with new symptoms of dysuria including frequency and hesitancy over the past few days.  I will prescribe Flomax for symptom control.    Subjective:      HPI: Loy Reid is a 73 year old male with  Prostate cancer (H) [C61]    The following portions of the patient's history were reviewed and updated as appropriate: allergies, current medications, past family history, past medical history, past social history, past surgical history and problem list.    Assessment                  Body Site:  Pelvis  Stereotactic Radiosurgery: No  Today's Dose: 4320  Total Dose for Pelvis: 7020  Today's Fraction/Total Fraction Pelvis:   Diarrhea W/O Colostomy: 0: None  Constipation: 0: None  Proctitis: 0: None  Urinary frequency and/or urgency: 2: Increase more than two times normul but less than hourly  Urinary Retention: 1: Hesitancy or dribbling. No sig residual, retention during immediate post op  Urinary Incontinence: 0: None  Dysuria: 1: Mild symptoms requiring no intervention  Nocturia: 4  Urine Color Change: 0: Normal                    Was an  used for assessment: No              Sexuality Alteration                    Emotional Alteration    Copin: Effective  Comfort Alteration   KPS: 100 % Normal, no complaints  Fatigue (ONS scale): 2: Mild Fatigue   Nutrition Alteration   Anorexia: 0: None  Nausea: 0: None  Vomitin: None  Weight: 83.4 kg (183 lb 14.4 oz)  Skin Alteration   Skin Sensation: 0: No problem  Skin Reaction: 0: None  AUA Assessment                                           Accompanied by       Objective:     Exam: Examination reviewed no significant changes.    Vitals:    10/11/23 0945   BP: 139/65   Pulse: 51   Resp: 16    Temp: 97.5  F (36.4  C)   TempSrc: Oral   SpO2: 99%   Weight: 83.4 kg (183 lb 14.4 oz)   PainSc: No Pain (0)       Wt Readings from Last 8 Encounters:   10/11/23 83.4 kg (183 lb 14.4 oz)   10/04/23 83.8 kg (184 lb 11.2 oz)   09/27/23 84.1 kg (185 lb 6.4 oz)   09/26/23 84 kg (185 lb 1.6 oz)   09/20/23 84.1 kg (185 lb 8 oz)   09/08/23 82.1 kg (181 lb)   08/29/23 83.6 kg (184 lb 6.4 oz)   07/18/23 85.1 kg (187 lb 11.2 oz)       General: Alert and oriented, in no acute distress  Loy has no Erythema.  Aria chart and setup information reviewed    Lacey Young MD

## 2023-10-12 ENCOUNTER — APPOINTMENT (OUTPATIENT)
Dept: RADIATION ONCOLOGY | Facility: CLINIC | Age: 73
End: 2023-10-12
Attending: RADIOLOGY
Payer: MEDICARE

## 2023-10-12 PROCEDURE — 77385 HC IMRT TREATMENT DELIVERY, SIMPLE: CPT | Performed by: STUDENT IN AN ORGANIZED HEALTH CARE EDUCATION/TRAINING PROGRAM

## 2023-10-12 PROCEDURE — 77014 PR CT GUIDE FOR PLACEMENT RADIATION THERAPY FIELDS: CPT | Mod: 26 | Performed by: STUDENT IN AN ORGANIZED HEALTH CARE EDUCATION/TRAINING PROGRAM

## 2023-10-13 ENCOUNTER — APPOINTMENT (OUTPATIENT)
Dept: RADIATION ONCOLOGY | Facility: CLINIC | Age: 73
End: 2023-10-13
Attending: RADIOLOGY
Payer: MEDICARE

## 2023-10-13 PROCEDURE — 77385 HC IMRT TREATMENT DELIVERY, SIMPLE: CPT | Performed by: STUDENT IN AN ORGANIZED HEALTH CARE EDUCATION/TRAINING PROGRAM

## 2023-10-13 PROCEDURE — 77014 PR CT GUIDE FOR PLACEMENT RADIATION THERAPY FIELDS: CPT | Mod: 26 | Performed by: STUDENT IN AN ORGANIZED HEALTH CARE EDUCATION/TRAINING PROGRAM

## 2023-10-14 ENCOUNTER — HEALTH MAINTENANCE LETTER (OUTPATIENT)
Age: 73
End: 2023-10-14

## 2023-10-16 ENCOUNTER — APPOINTMENT (OUTPATIENT)
Dept: RADIATION ONCOLOGY | Facility: CLINIC | Age: 73
End: 2023-10-16
Attending: RADIOLOGY
Payer: MEDICARE

## 2023-10-16 PROCEDURE — 77385 HC IMRT TREATMENT DELIVERY, SIMPLE: CPT | Performed by: RADIOLOGY

## 2023-10-16 PROCEDURE — 77014 PR CT GUIDE FOR PLACEMENT RADIATION THERAPY FIELDS: CPT | Mod: 26 | Performed by: RADIOLOGY

## 2023-10-17 ENCOUNTER — APPOINTMENT (OUTPATIENT)
Dept: RADIATION ONCOLOGY | Facility: CLINIC | Age: 73
End: 2023-10-17
Attending: RADIOLOGY
Payer: MEDICARE

## 2023-10-17 PROCEDURE — 77385 HC IMRT TREATMENT DELIVERY, SIMPLE: CPT | Performed by: RADIOLOGY

## 2023-10-17 PROCEDURE — 77427 RADIATION TX MANAGEMENT X5: CPT | Performed by: RADIOLOGY

## 2023-10-17 PROCEDURE — 77336 RADIATION PHYSICS CONSULT: CPT | Performed by: RADIOLOGY

## 2023-10-17 PROCEDURE — 77014 PR CT GUIDE FOR PLACEMENT RADIATION THERAPY FIELDS: CPT | Mod: 26 | Performed by: RADIOLOGY

## 2023-10-18 ENCOUNTER — APPOINTMENT (OUTPATIENT)
Dept: RADIATION ONCOLOGY | Facility: CLINIC | Age: 73
End: 2023-10-18
Attending: RADIOLOGY
Payer: MEDICARE

## 2023-10-18 VITALS
HEART RATE: 54 BPM | DIASTOLIC BLOOD PRESSURE: 68 MMHG | WEIGHT: 185.6 LBS | OXYGEN SATURATION: 100 % | SYSTOLIC BLOOD PRESSURE: 150 MMHG | BODY MASS INDEX: 28.64 KG/M2 | TEMPERATURE: 97.6 F | RESPIRATION RATE: 16 BRPM

## 2023-10-18 DIAGNOSIS — C61 PROSTATE CANCER (H): Primary | ICD-10-CM

## 2023-10-18 PROCEDURE — 77385 HC IMRT TREATMENT DELIVERY, SIMPLE: CPT | Performed by: RADIOLOGY

## 2023-10-18 PROCEDURE — 77014 PR CT GUIDE FOR PLACEMENT RADIATION THERAPY FIELDS: CPT | Mod: 26 | Performed by: RADIOLOGY

## 2023-10-18 ASSESSMENT — PAIN SCALES - GENERAL: PAINLEVEL: NO PAIN (0)

## 2023-10-18 NOTE — PROGRESS NOTES
RADIATION ONCOLOGY WEEKLY TREATMENT VISIT NOTE      Assessment / Impression     Prostate cancer (H) [C61]    Tolerating radiation therapy well.  All questions and concerns addressed.    Plan:     Continue radiation treatment as prescribed.    The dysuria has been significantly improved after taking Flomax.    Subjective:      HPI: Loy Reid is a 73 year old male with  Prostate cancer (H) [C61]    The following portions of the patient's history were reviewed and updated as appropriate: allergies, current medications, past family history, past medical history, past social history, past surgical history and problem list.    Assessment                  Body Site:  Pelvis  Stereotactic Radiosurgery: No  Today's Dose: 5670  Total Dose for Pelvis: 7020  Today's Fraction/Total Fraction Pelvis:   Drainage: 0: Absent  Diarrhea W/O Colostomy: 0: None  Constipation: 0: None  Proctitis: 0: None  Urinary frequency and/or urgency: 2: Increase more than two times normul but less than hourly  Urinary Retention: 1: Hesitancy or dribbling. No sig residual, retention during immediate post op  Urinary Incontinence: 0: None  Dysuria: 1: Mild symptoms requiring no intervention  Nocturia: 2-3  Urine Color Change: 0: Normal                    Was an  used for assessment: No              Sexuality Alteration                    Emotional Alteration    Copin: Effective  Comfort Alteration   KPS: 100 % Normal, no complaints  Fatigue (ONS scale): 2: Mild Fatigue  Pain Location: denies   Nutrition Alteration   Anorexia: 0: None  Nausea: 0: None  Vomitin: None  Weight: 84.2 kg (185 lb 9.6 oz)  Skin Alteration   Skin Sensation: 0: No problem  Skin Reaction: 0: None  AUA Assessment                                           Accompanied by       Objective:     Exam: Examination reviewed no significant changes.    Vitals:    10/18/23 0947   BP: (!) 150/68   Pulse: 54   Resp: 16   Temp: 97.6  F (36.4  C)   TempSrc:  Oral   SpO2: 100%   Weight: 84.2 kg (185 lb 9.6 oz)   PainSc: No Pain (0)       Wt Readings from Last 8 Encounters:   10/18/23 84.2 kg (185 lb 9.6 oz)   10/11/23 83.4 kg (183 lb 14.4 oz)   10/04/23 83.8 kg (184 lb 11.2 oz)   09/27/23 84.1 kg (185 lb 6.4 oz)   09/26/23 84 kg (185 lb 1.6 oz)   09/20/23 84.1 kg (185 lb 8 oz)   09/08/23 82.1 kg (181 lb)   08/29/23 83.6 kg (184 lb 6.4 oz)       General: Alert and oriented, in no acute distress  Loy has no Erythema.  Aria chart and setup information reviewed    Lacey Young MD

## 2023-10-18 NOTE — LETTER
10/18/2023         RE: Loy Reid  [address] [facility] [address]        Dear Colleague,    Thank you for referring your patient, Loy Reid, to the [facility] RADIATION ONCOLOGY [place]. Please see a copy of my visit note below.    RADIATION ONCOLOGY WEEKLY TREATMENT VISIT NOTE      Assessment / Impression     Prostate cancer (H) [C61]    Tolerating radiation therapy well.  All questions and concerns addressed.    Plan:     Continue radiation treatment as prescribed.    The dysuria has been significantly improved after taking Flomax.    Subjective:      HPI: Loy Reid is a 73 year old male with  Prostate cancer (H) [C61]    The following portions of the patient's history were reviewed and updated as appropriate: allergies, current medications, past family history, past medical history, past social history, past surgical history and problem list.    Assessment                  Body Site:  Pelvis  Stereotactic Radiosurgery: No  Today's Dose: 5670  Total Dose for Pelvis: 7020  Today's Fraction/Total Fraction Pelvis:   Drainage: 0: Absent  Diarrhea W/O Colostomy: 0: None  Constipation: 0: None  Proctitis: 0: None  Urinary frequency and/or urgency: 2: Increase more than two times normul but less than hourly  Urinary Retention: 1: Hesitancy or dribbling. No sig residual, retention during immediate post op  Urinary Incontinence: 0: None  Dysuria: 1: Mild symptoms requiring no intervention  Nocturia: 2-3  Urine Color Change: 0: Normal                    Was an  used for assessment: No              Sexuality Alteration                    Emotional Alteration    Copin: Effective  Comfort Alteration   KPS: 100 % Normal, no complaints  Fatigue (ONS scale): 2: Mild Fatigue  Pain Location: denies   Nutrition Alteration   Anorexia: 0: None  Nausea: 0: None  Vomitin: None  Weight: 84.2 kg (185 lb 9.6 oz)  Skin Alteration   Skin Sensation: 0: No problem  Skin  Reaction: 0: None  AUA Assessment                                           Accompanied by       Objective:     Exam: Examination reviewed no significant changes.    Vitals:    10/18/23 0947   BP: (!) 150/68   Pulse: 54   Resp: 16   Temp: 97.6  F (36.4  C)   TempSrc: Oral   SpO2: 100%   Weight: 84.2 kg (185 lb 9.6 oz)   PainSc: No Pain (0)       Wt Readings from Last 8 Encounters:   10/18/23 84.2 kg (185 lb 9.6 oz)   10/11/23 83.4 kg (183 lb 14.4 oz)   10/04/23 83.8 kg (184 lb 11.2 oz)   09/27/23 84.1 kg (185 lb 6.4 oz)   09/26/23 84 kg (185 lb 1.6 oz)   09/20/23 84.1 kg (185 lb 8 oz)   09/08/23 82.1 kg (181 lb)   08/29/23 83.6 kg (184 lb 6.4 oz)       General: Alert and oriented, in no acute distress  Loy has no Erythema.  Aria chart and setup information reviewed    Lacey Young MD      Again, thank you for allowing me to participate in the care of your patient.        Sincerely,        Lacey Young MD

## 2023-10-19 ENCOUNTER — APPOINTMENT (OUTPATIENT)
Dept: RADIATION ONCOLOGY | Facility: CLINIC | Age: 73
End: 2023-10-19
Attending: RADIOLOGY
Payer: MEDICARE

## 2023-10-19 PROCEDURE — 77014 PR CT GUIDE FOR PLACEMENT RADIATION THERAPY FIELDS: CPT | Mod: 26 | Performed by: RADIOLOGY

## 2023-10-19 PROCEDURE — 77385 HC IMRT TREATMENT DELIVERY, SIMPLE: CPT | Performed by: RADIOLOGY

## 2023-10-20 ENCOUNTER — APPOINTMENT (OUTPATIENT)
Dept: RADIATION ONCOLOGY | Facility: CLINIC | Age: 73
End: 2023-10-20
Attending: RADIOLOGY
Payer: MEDICARE

## 2023-10-20 PROCEDURE — 77014 PR CT GUIDE FOR PLACEMENT RADIATION THERAPY FIELDS: CPT | Mod: 26 | Performed by: RADIOLOGY

## 2023-10-20 PROCEDURE — 77385 HC IMRT TREATMENT DELIVERY, SIMPLE: CPT | Performed by: RADIOLOGY

## 2023-10-23 ENCOUNTER — APPOINTMENT (OUTPATIENT)
Dept: RADIATION ONCOLOGY | Facility: CLINIC | Age: 73
End: 2023-10-23
Attending: RADIOLOGY
Payer: MEDICARE

## 2023-10-23 PROCEDURE — 77014 PR CT GUIDE FOR PLACEMENT RADIATION THERAPY FIELDS: CPT | Mod: 26 | Performed by: RADIOLOGY

## 2023-10-23 PROCEDURE — 77385 HC IMRT TREATMENT DELIVERY, SIMPLE: CPT | Performed by: RADIOLOGY

## 2023-10-26 ENCOUNTER — APPOINTMENT (OUTPATIENT)
Dept: RADIATION ONCOLOGY | Facility: CLINIC | Age: 73
End: 2023-10-26
Attending: RADIOLOGY
Payer: MEDICARE

## 2023-10-26 PROCEDURE — 77336 RADIATION PHYSICS CONSULT: CPT | Performed by: RADIOLOGY

## 2023-10-26 PROCEDURE — 77385 HC IMRT TREATMENT DELIVERY, SIMPLE: CPT | Performed by: STUDENT IN AN ORGANIZED HEALTH CARE EDUCATION/TRAINING PROGRAM

## 2023-10-26 PROCEDURE — 77014 PR CT GUIDE FOR PLACEMENT RADIATION THERAPY FIELDS: CPT | Mod: 26 | Performed by: STUDENT IN AN ORGANIZED HEALTH CARE EDUCATION/TRAINING PROGRAM

## 2023-10-26 PROCEDURE — 77427 RADIATION TX MANAGEMENT X5: CPT | Performed by: RADIOLOGY

## 2023-10-27 ENCOUNTER — ALLIED HEALTH/NURSE VISIT (OUTPATIENT)
Dept: RADIATION ONCOLOGY | Facility: CLINIC | Age: 73
End: 2023-10-27
Attending: STUDENT IN AN ORGANIZED HEALTH CARE EDUCATION/TRAINING PROGRAM
Payer: MEDICARE

## 2023-10-27 ENCOUNTER — APPOINTMENT (OUTPATIENT)
Dept: RADIATION ONCOLOGY | Facility: CLINIC | Age: 73
End: 2023-10-27
Attending: RADIOLOGY
Payer: MEDICARE

## 2023-10-27 DIAGNOSIS — C61 PROSTATE CANCER (H): Primary | ICD-10-CM

## 2023-10-27 PROCEDURE — 77014 PR CT GUIDE FOR PLACEMENT RADIATION THERAPY FIELDS: CPT | Mod: 26 | Performed by: STUDENT IN AN ORGANIZED HEALTH CARE EDUCATION/TRAINING PROGRAM

## 2023-10-27 PROCEDURE — 77385 HC IMRT TREATMENT DELIVERY, SIMPLE: CPT | Performed by: STUDENT IN AN ORGANIZED HEALTH CARE EDUCATION/TRAINING PROGRAM

## 2023-10-27 NOTE — PROGRESS NOTES
Radiation Treatment Summary          Patient: Loy Reid            MRN: [id number]           : 1950        Care Provider: Lacey Young MD         Date of Service: Oct 27, 2023      Burak Lugo MD  [address] [place] [id number]           Dear Dr. Lugo:     Your patient Mr. Loy Reid completed his radiation therapy on 10/27/2023. As you know Mr. Reid is a 72 year old male with a diagnosis of unfavorable intermediate risk prostate cancer, clinical stage T2 a N0 M0, [place] grade 4+3 = 7 involving 3/15 cores, and a pretherapy PSA of 7.20.  Staging work-up including bone scan, CT abdomen pelvics and MRI scan showed no evidence of lymph nodes and bone metastasis.  The patient received definitive radiation therapy for his prostate cancer with a total dose of 7020 cGy in 26 treatments given from 2023 - 10/27/2023.  He tolerated radiation therapy well with minimal side effect.  The patient is scheduled to return to radiation oncology in 4 weeks for routine post therapy office follow-up and PSA.    Again, thank you very much for the referral and allowing me to participate in the care of this patient.  If you have any questions or concerns about this patient, please do not hesitate to call.          Sincerely,      Lacey Young MD, PhD  Department of Radiation Oncology   [facility] Radiation Oncology  Tel: [phone]  Page: [phone]    [facility]  [address] [place] [address]     [address]   [place] [address]    CC:  Patient Care Team:  López Barahona MD as PCP - General  López Barahona MD as Assigned PCP  Burak Lugo MD as MD (Urology)  Burak Lugo MD as Assigned Surgical Provider  Lacey Young MD as MD (Radiation Oncology)  Linda Ochoa MD as MD (Cardiovascular Disease)  Lacey Young MD as Assigned Cancer Care Provider  Linda Ochoa MD as Assigned Heart and Vascular Provider

## 2023-10-27 NOTE — PROGRESS NOTES
Patient here ambulatory for final radiation therapy treatment for his prostate cancer.  Patient states he is doing well but has some fatigue.  Written discharge instructions reviewed and given to patient.  Follow-up with Dr. Young in about 4 to 6 weeks with a PSA.  Patient able to verbalize understanding and instructed to stop at  to schedule appointments on way out of clinic.

## 2023-11-03 ENCOUNTER — HOSPITAL ENCOUNTER (OUTPATIENT)
Dept: CARDIAC REHAB | Facility: CLINIC | Age: 73
Discharge: HOME | End: 2023-11-03
Attending: RADIOLOGY
Payer: MEDICARE

## 2023-11-03 PROCEDURE — 93798 PHYS/QHP OP CAR RHAB W/ECG: CPT

## 2023-11-07 ENCOUNTER — HOSPITAL ENCOUNTER (OUTPATIENT)
Dept: CARDIAC REHAB | Facility: CLINIC | Age: 73
Discharge: HOME | End: 2023-11-07
Attending: INTERNAL MEDICINE
Payer: MEDICARE

## 2023-11-07 PROCEDURE — 93798 PHYS/QHP OP CAR RHAB W/ECG: CPT

## 2023-11-09 ENCOUNTER — HOSPITAL ENCOUNTER (OUTPATIENT)
Dept: CARDIAC REHAB | Facility: CLINIC | Age: 73
Discharge: HOME | End: 2023-11-09
Attending: INTERNAL MEDICINE
Payer: MEDICARE

## 2023-11-09 PROCEDURE — 93798 PHYS/QHP OP CAR RHAB W/ECG: CPT

## 2023-11-13 ENCOUNTER — TELEPHONE (OUTPATIENT)
Dept: RADIATION ONCOLOGY | Facility: CLINIC | Age: 73
End: 2023-11-13
Payer: MEDICARE

## 2023-11-13 NOTE — TELEPHONE ENCOUNTER
Called patient for routine follow-up phone call status post radiation for his prostate cancer.  Left message and told patient he did not need to call back unless he had questions or concerns.    Confirmed follow-up appointments and left callback number should patient need to get ahold of us.

## 2023-11-15 ENCOUNTER — HOSPITAL ENCOUNTER (OUTPATIENT)
Dept: CARDIAC REHAB | Facility: CLINIC | Age: 73
Discharge: HOME | End: 2023-11-15
Attending: INTERNAL MEDICINE
Payer: MEDICARE

## 2023-11-15 PROCEDURE — 93798 PHYS/QHP OP CAR RHAB W/ECG: CPT

## 2023-11-17 ENCOUNTER — HOSPITAL ENCOUNTER (OUTPATIENT)
Dept: CARDIAC REHAB | Facility: CLINIC | Age: 73
Discharge: HOME | End: 2023-11-17
Attending: INTERNAL MEDICINE
Payer: MEDICARE

## 2023-11-17 PROCEDURE — 93798 PHYS/QHP OP CAR RHAB W/ECG: CPT

## 2023-11-21 ENCOUNTER — HOSPITAL ENCOUNTER (OUTPATIENT)
Dept: CARDIAC REHAB | Facility: CLINIC | Age: 73
Discharge: HOME | End: 2023-11-21
Attending: INTERNAL MEDICINE
Payer: MEDICARE

## 2023-11-21 PROCEDURE — 93798 PHYS/QHP OP CAR RHAB W/ECG: CPT

## 2023-11-24 ENCOUNTER — HOSPITAL ENCOUNTER (OUTPATIENT)
Dept: CARDIAC REHAB | Facility: CLINIC | Age: 73
Discharge: HOME | End: 2023-11-24
Attending: INTERNAL MEDICINE
Payer: MEDICARE

## 2023-11-24 PROCEDURE — 93798 PHYS/QHP OP CAR RHAB W/ECG: CPT

## 2023-11-27 ENCOUNTER — LAB (OUTPATIENT)
Dept: CARDIOLOGY | Facility: CLINIC | Age: 73
End: 2023-11-27
Payer: MEDICARE

## 2023-11-27 DIAGNOSIS — E03.4 HYPOTHYROIDISM DUE TO ACQUIRED ATROPHY OF THYROID: ICD-10-CM

## 2023-11-27 DIAGNOSIS — C61 PROSTATE CANCER (H): ICD-10-CM

## 2023-11-27 DIAGNOSIS — E78.2 MIXED HYPERLIPIDEMIA: Chronic | ICD-10-CM

## 2023-11-27 LAB
CHOLEST SERPL-MCNC: 151 MG/DL
HDLC SERPL-MCNC: 32 MG/DL
LDLC SERPL CALC-MCNC: 97 MG/DL
NONHDLC SERPL-MCNC: 119 MG/DL
PSA SERPL DL<=0.01 NG/ML-MCNC: 6.18 NG/ML (ref 0–6.5)
TRIGL SERPL-MCNC: 108 MG/DL
TSH SERPL DL<=0.005 MIU/L-ACNC: 2.62 UIU/ML (ref 0.3–4.2)

## 2023-11-27 PROCEDURE — 84153 ASSAY OF PSA TOTAL: CPT

## 2023-11-27 PROCEDURE — 84443 ASSAY THYROID STIM HORMONE: CPT

## 2023-11-27 PROCEDURE — 80061 LIPID PANEL: CPT

## 2023-11-27 PROCEDURE — 36415 COLL VENOUS BLD VENIPUNCTURE: CPT

## 2023-11-28 ENCOUNTER — HOSPITAL ENCOUNTER (OUTPATIENT)
Dept: CARDIAC REHAB | Facility: CLINIC | Age: 73
Discharge: HOME | End: 2023-11-28
Attending: INTERNAL MEDICINE
Payer: MEDICARE

## 2023-11-28 PROCEDURE — 93798 PHYS/QHP OP CAR RHAB W/ECG: CPT

## 2023-11-29 ENCOUNTER — OFFICE VISIT (OUTPATIENT)
Dept: RADIATION ONCOLOGY | Facility: CLINIC | Age: 73
End: 2023-11-29
Attending: RADIOLOGY
Payer: MEDICARE

## 2023-11-29 VITALS
WEIGHT: 186.6 LBS | SYSTOLIC BLOOD PRESSURE: 137 MMHG | DIASTOLIC BLOOD PRESSURE: 61 MMHG | OXYGEN SATURATION: 97 % | TEMPERATURE: 97.6 F | HEART RATE: 58 BPM | BODY MASS INDEX: 28.79 KG/M2 | RESPIRATION RATE: 16 BRPM

## 2023-11-29 DIAGNOSIS — C61 PROSTATE CANCER (H): Primary | ICD-10-CM

## 2023-11-29 PROCEDURE — G0463 HOSPITAL OUTPT CLINIC VISIT: HCPCS | Performed by: RADIOLOGY

## 2023-11-29 ASSESSMENT — PAIN SCALES - GENERAL: PAINLEVEL: NO PAIN (0)

## 2023-11-29 NOTE — LETTER
11/29/2023         RE: Loy Reid  [address]  [place] [address]        Dear Colleague,    Thank you for referring your patient, Loy Reid, to the [facility] RADIATION ONCOLOGY [place]. Please see a copy of my visit note below.    [facility] Radiation Oncology Follow Up     Patient: Loy Reid  MRN: [id number]  Date of Service: 11/29/2023       DISEASE TREATED:  Unfavorable intermediate risk prostate cancer, clinical stage T2 a N0 M0, Aaron grade 4+3 = 7 involving 3/15 cores, and a pretherapy PSA of 7.20.  Staging work-up including bone scan, CT abdomen pelvics and MRI scan showed no evidence of lymph nodes and bone metastasis.         TYPE OF RADIATION THERAPY ADMINISTERED:  7020 cGy in 26 treatments given from 9/20/2023 - 10/27/2023.      INTERVAL SINCE COMPLETION OF RADIATION THERAPY:  1 month.      SUBJECTIVE:  Mr. Reid is a 73 year old male with other medical condition including COPD, coronary artery disease and history of MI with stents placement in 2010.  The patient presented with history of elevation of PSA to 7.20 on 12/8/2022 for which he was a seeking further evaluation.  MRI of pelvics on 3/6/2023 showed 30 g prostate gland with a 21 x 21 mm PI-RADS 5 lesion in the right and the left base and mid gland transitional zone at the 10-1 o'clock position suspicious for malignancy.  There is a long segment capsular abutment indicating moderate suspicious of minimum extraprostatic extension.  There is no evidence of lymph nodes and bone metastasis.  Patient underwent ultrasound-guided needle biopsy on 5/5/2023.  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  The remaining 12 cores of biopsy was negative. Staging work-up including bone scan, CT abdomen pelvics and MRI scan showed no evidence of lymph nodes and bone metastasis.  The patient received  definitive radiation therapy for his prostate cancer with a total dose of 7020 cGy in 26 treatments given from 9/20/2023 - 10/27/2023.  He tolerated radiation therapy well with minimal side effect.       The patient has been recovering well since surgery.  His bowel and bladder functions has been returned to his normal status.  The patient still has a chronic dysuria and has been taking Flomax with moderate relief.  The patient is here for routine post therapy office follow-up.    Medications were reviewed and are up to date on EPIC.    The following portions of the patient's history were reviewed and updated as appropriate: allergies, current medications, past family history, past medical history, past social history, past surgical history and problem list.    Review of Systems:      General  Constitutional  Constitutional (WDL): Exceptions to WDL  Fatigue: Fatigue relieved by rest  EENT  Eye Disorders  Eye Disorder (WDL): All eye disorder elements are within defined limits (wears glasses)  Ear Disorders  Ear Disorder (WDL): All ear disorder elements are within defined limits  Respiratory  Respiratory  Respiratory (WDL): Exceptions to WDL  Cough: Mild symptoms OR nonprescription intervention indicated  Dyspnea: Shortness of breath with moderate exertion  Cardiovascular  Cardiovascular  Cardiovascular (WDL): Exceptions to WDL (Hx of MI, no pacemaker)  Edema: Yes  Edema Limbs: 5 - 10% inter-limb discrepancy in volume or circumference at point of greatest visible difference OR swelling or obscuration of anatomic architecture on close inspection (bilateral LE's)  Gastrointestinal  Gastrointestinal  Gastrointestinal (WDL): Exceptions to WDL  Constipation: Occasional or intermittent symptoms OR occasional use of stool softeners, laxatives, dietary modification, or enema  Musculoskeletal  Musculoskeletal and Connective Tissue Disorders  Musculoskeletal & Connective (WDL): All musculoskeletal & connective elements are  within defined limits  Integumentary  Integumentary  Integumentary (WDL): All integumentary elements are within defined limits  Neurological  Neurosensory  Neurosensory (WDL): All neurosensory elements are within defined limits  Genitourinary/Reproductive  Genitourinary  Genitourinary (WDL): Exceptions to WDL  Urinary Frequency: Present (nocturia x2)  Lymphatic  Lymph System Disorders  Lymph (WDL): All lymph elements are within defined limits  Pain  Pain Score: No Pain (0)  AUA Assessment  Over the Past Month  How often have you had a sensation of not emptying your bladder completely after you have finished urinating: About half the time: Score: 3  How often have you had to urinate again less than 2 hours after you finshed urinating: About half the time: Score: 3  How often have you found you stopped and started again several times when you urinated: More than half the time: Score: 4  How often have you found it difficult to postpone urine: Less than half the time: Score: 2  How often have you had a weak urinary stream: Almost Always: Score: 5  How often have you had to push or starin to begin uriation: More than half the time: Score: 4  How many times do you most typically get up to urinate from the time you went to bed at night until the time you got up in the morning?: 2 times, Score: 2  Total AUA Score: Degree of Severity: 20-35 is Severe (23)  If you had to spend the rest of your life with your urinary condition just the way it is now, how would you feel: Mostly Satisfied  Please check the appropriate box that describes your ability to have an erection: Able to have an erection and intercourse but erection is not normal                                                           Accompanied by  Accompanied By: spouse    Objective:      PHYSICAL EXAMINATION:    /61 (BP Location: Right arm, Patient Position: Sitting, Cuff Size: Adult Regular)   Pulse 58   Temp 97.6  F (36.4  C) (Oral)   Resp 16   Wt 84.6  kg (186 lb 9.6 oz)   SpO2 97%   BMI 28.79 kg/m      Gen: Alert, in NAD  Eyes: PERRL, EOMI, sclera anicteric  Pulm: No wheezing, stridor or respiratory distress  CV: Well-perfused, no cyanosis, no pedal edema  Back: No step-offs or pain to palpation along the thoracolumbar spine  Rectal: Deferred  : Deferred  Musculoskeletal: Normal muscle bulk and tone  Skin: Normal color and turgor  Neurologic: A/Ox3, CN II-XII intact, normal gait and station  Psychiatric: Appropriate mood and affect     Prostate Specific Antigen Screen   Date Value Ref Range Status   12/08/2022 7.20 (H) 0.00 - 6.50 ng/mL Final   11/26/2019 6.9 (H) 0.0 - 4.5 ng/mL Final     PSA Tumor Marker   Date Value Ref Range Status   11/27/2023 6.18 0.00 - 6.50 ng/mL Final   06/08/2023 6.76 (H) 0.00 - 6.50 ng/mL Final      Impression     Mr. Reid is a 72 year old male with a diagnosis of unfavorable intermediate risk prostate cancer, clinical stage T2 a N0 M0, Aaron grade 4+3 = 7 involving 3/15 cores, and a pretherapy PSA of 7.20.  Staging work-up including bone scan, CT abdomen pelvics and MRI scan showed no evidence of lymph nodes and bone metastasis.  The patient received definitive radiation therapy for his prostate cancer with a total dose of 7020 cGy in 26 treatments given from 9/20/2023 - 10/27/2023.     Assessment & Plan:     1.  The patient has been stable since radiation therapy with mild to moderate improvement of his urinary function.  His PSA showed small decline at this time.  We will check his PSA again in 3 months and office follow-up    2.  Continue follow-up with Dr. López Barahona, PCP and Dr. Lugo, urology as planned    Face to face time  20 minutes with > 80% spent on consultation, education and coordination of care.      Lacey Young MD  Department of Radiation Oncology   [facility]  Tel: [phone]  Page: [phone]    [facility]  [address] [place], [place] [id number] [place] [address]  "[place] [address]    CC:  Patient Care Team:  López Barahona MD as PCP - General  López Barahona MD as Assigned PCP  Burak Lugo MD as MD (Urology)  Burak Lugo MD as Assigned Surgical Provider  Lacey Young MD as MD (Radiation Oncology)  Linda Ochoa MD as MD (Cardiovascular Disease)  Lacey Young MD as Assigned Cancer Care Provider  Linda Ochoa MD as Assigned Heart and Vascular Provider      Oncology Rooming Note    November 29, 2023 9:52 AM   Loy Reid is a 73 year old male who presents for:    Chief Complaint   Patient presents with     Oncology Clinic Visit     Follow up with Dr. Young     Initial Vitals: /61 (BP Location: Right arm, Patient Position: Sitting, Cuff Size: Adult Regular)   Pulse 58   Temp 97.6  F (36.4  C) (Oral)   Resp 16   Wt 84.6 kg (186 lb 9.6 oz)   SpO2 97%   BMI 28.79 kg/m   Estimated body mass index is 28.79 kg/m  as calculated from the following:    Height as of 9/26/23: 1.715 m (5' 7.5\").    Weight as of this encounter: 84.6 kg (186 lb 9.6 oz). Body surface area is 2.01 meters squared.  No Pain (0) Comment: Data Unavailable   No LMP for male patient.  Allergies reviewed: Yes  Medications reviewed: Yes    Medications: Medication refills not needed today.  Pharmacy name entered into [email or web address]: [id number] [address] [facility] [address]    Clinical concerns: Patient here ambulatory accompanied by his wife for follow-up status post radiation for his prostate cancer.  Patient continues to have urinary symptoms but feels they are tolerable.  PSA and AUA completed.  Seen by Dr. Young.  Plan return to clinic for follow-up as directed by provider.   Dr. Young was notified.      Mary Renteria RN                Again, thank you for allowing me to participate in the care of your patient.        Sincerely,        Lacey Young MD  "

## 2023-11-29 NOTE — PROGRESS NOTES
"Oncology Rooming Note    November 29, 2023 9:52 AM   Loy Reid is a 73 year old male who presents for:    Chief Complaint   Patient presents with    Oncology Clinic Visit     Follow up with Dr. Young     Initial Vitals: /61 (BP Location: Right arm, Patient Position: Sitting, Cuff Size: Adult Regular)   Pulse 58   Temp 97.6  F (36.4  C) (Oral)   Resp 16   Wt 84.6 kg (186 lb 9.6 oz)   SpO2 97%   BMI 28.79 kg/m   Estimated body mass index is 28.79 kg/m  as calculated from the following:    Height as of 9/26/23: 1.715 m (5' 7.5\").    Weight as of this encounter: 84.6 kg (186 lb 9.6 oz). Body surface area is 2.01 meters squared.  No Pain (0) Comment: Data Unavailable   No LMP for male patient.  Allergies reviewed: Yes  Medications reviewed: Yes    Medications: Medication refills not needed today.  Pharmacy name entered into [employer]: [id number] [address] [place] [facility] [address]    Clinical concerns: Patient here ambulatory accompanied by his wife for follow-up status post radiation for his prostate cancer.  Patient continues to have urinary symptoms but feels they are tolerable.  PSA and AUA completed.  Seen by Dr. Young.  Plan return to clinic for follow-up as directed by provider.   Dr. Young was notified.      Mary Renteria RN              "

## 2023-11-29 NOTE — PROGRESS NOTES
[facility] Radiation Oncology Follow Up     Patient: Loy Reid  MRN: [id number]  Date of Service: 11/29/2023       DISEASE TREATED:  Unfavorable intermediate risk prostate cancer, clinical stage T2 a N0 M0, Reynolds grade 4+3 = 7 involving 3/15 cores, and a pretherapy PSA of 7.20.  Staging work-up including bone scan, CT abdomen pelvics and MRI scan showed no evidence of lymph nodes and bone metastasis.         TYPE OF RADIATION THERAPY ADMINISTERED:  7020 cGy in 26 treatments given from 9/20/2023 - 10/27/2023.      INTERVAL SINCE COMPLETION OF RADIATION THERAPY:  1 month.      SUBJECTIVE:  Mr. Reid is a 73 year old male with other medical condition including COPD, coronary artery disease and history of MI with stents placement in 2010.  The patient presented with history of elevation of PSA to 7.20 on 12/8/2022 for which he was a seeking further evaluation.  MRI of pelvics on 3/6/2023 showed 30 g prostate gland with a 21 x 21 mm PI-RADS 5 lesion in the right and the left base and mid gland transitional zone at the 10-1 o'clock position suspicious for malignancy.  There is a long segment capsular abutment indicating moderate suspicious of minimum extraprostatic extension.  There is no evidence of lymph nodes and bone metastasis.  Patient underwent ultrasound-guided needle biopsy on 5/5/2023.  The pathology from the targeted lesion showed prostatic acinar Adenocarcinoma, Reynolds score 4+3 = 7 involving 3 of 3 cores, representing 30% total core volume.  The remaining 12 cores of biopsy was negative. Staging work-up including bone scan, CT abdomen pelvics and MRI scan showed no evidence of lymph nodes and bone metastasis.  The patient received definitive radiation therapy for his prostate cancer with a total dose of 7020 cGy in 26 treatments given from 9/20/2023 - 10/27/2023.  He tolerated radiation therapy well with minimal side effect.       The patient has been recovering well since surgery.  His bowel  and bladder functions has been returned to his normal status.  The patient still has a chronic dysuria and has been taking Flomax with moderate relief.  The patient is here for routine post therapy office follow-up.    Medications were reviewed and are up to date on EPIC.    The following portions of the patient's history were reviewed and updated as appropriate: allergies, current medications, past family history, past medical history, past social history, past surgical history and problem list.    Review of Systems:      General  Constitutional  Constitutional (WDL): Exceptions to WDL  Fatigue: Fatigue relieved by rest  EENT  Eye Disorders  Eye Disorder (WDL): All eye disorder elements are within defined limits (wears glasses)  Ear Disorders  Ear Disorder (WDL): All ear disorder elements are within defined limits  Respiratory  Respiratory  Respiratory (WDL): Exceptions to WDL  Cough: Mild symptoms OR nonprescription intervention indicated  Dyspnea: Shortness of breath with moderate exertion  Cardiovascular  Cardiovascular  Cardiovascular (WDL): Exceptions to WDL (Hx of MI, no pacemaker)  Edema: Yes  Edema Limbs: 5 - 10% inter-limb discrepancy in volume or circumference at point of greatest visible difference OR swelling or obscuration of anatomic architecture on close inspection (bilateral LE's)  Gastrointestinal  Gastrointestinal  Gastrointestinal (WDL): Exceptions to WDL  Constipation: Occasional or intermittent symptoms OR occasional use of stool softeners, laxatives, dietary modification, or enema  Musculoskeletal  Musculoskeletal and Connective Tissue Disorders  Musculoskeletal & Connective (WDL): All musculoskeletal & connective elements are within defined limits  Integumentary  Integumentary  Integumentary (WDL): All integumentary elements are within defined limits  Neurological  Neurosensory  Neurosensory (WDL): All neurosensory elements are within defined  limits  Genitourinary/Reproductive  Genitourinary  Genitourinary (WDL): Exceptions to WDL  Urinary Frequency: Present (nocturia x2)  Lymphatic  Lymph System Disorders  Lymph (WDL): All lymph elements are within defined limits  Pain  Pain Score: No Pain (0)  AUA Assessment  Over the Past Month  How often have you had a sensation of not emptying your bladder completely after you have finished urinating: About half the time: Score: 3  How often have you had to urinate again less than 2 hours after you finshed urinating: About half the time: Score: 3  How often have you found you stopped and started again several times when you urinated: More than half the time: Score: 4  How often have you found it difficult to postpone urine: Less than half the time: Score: 2  How often have you had a weak urinary stream: Almost Always: Score: 5  How often have you had to push or starin to begin uriation: More than half the time: Score: 4  How many times do you most typically get up to urinate from the time you went to bed at night until the time you got up in the morning?: 2 times, Score: 2  Total AUA Score: Degree of Severity: 20-35 is Severe (23)  If you had to spend the rest of your life with your urinary condition just the way it is now, how would you feel: Mostly Satisfied  Please check the appropriate box that describes your ability to have an erection: Able to have an erection and intercourse but erection is not normal                                                           Accompanied by  Accompanied By: spouse    Objective:      PHYSICAL EXAMINATION:    /61 (BP Location: Right arm, Patient Position: Sitting, Cuff Size: Adult Regular)   Pulse 58   Temp 97.6  F (36.4  C) (Oral)   Resp 16   Wt 84.6 kg (186 lb 9.6 oz)   SpO2 97%   BMI 28.79 kg/m      Gen: Alert, in NAD  Eyes: PERRL, EOMI, sclera anicteric  Pulm: No wheezing, stridor or respiratory distress  CV: Well-perfused, no cyanosis, no pedal edema  Back: No  step-offs or pain to palpation along the thoracolumbar spine  Rectal: Deferred  : Deferred  Musculoskeletal: Normal muscle bulk and tone  Skin: Normal color and turgor  Neurologic: A/Ox3, CN II-XII intact, normal gait and station  Psychiatric: Appropriate mood and affect     Prostate Specific Antigen Screen   Date Value Ref Range Status   12/08/2022 7.20 (H) 0.00 - 6.50 ng/mL Final   11/26/2019 6.9 (H) 0.0 - 4.5 ng/mL Final     PSA Tumor Marker   Date Value Ref Range Status   11/27/2023 6.18 0.00 - 6.50 ng/mL Final   06/08/2023 6.76 (H) 0.00 - 6.50 ng/mL Final      Impression     Mr. Reid is a 72 year old male with a diagnosis of unfavorable intermediate risk prostate cancer, clinical stage T2 a N0 M0, [place] grade 4+3 = 7 involving 3/15 cores, and a pretherapy PSA of 7.20.  Staging work-up including bone scan, CT abdomen pelvics and MRI scan showed no evidence of lymph nodes and bone metastasis.  The patient received definitive radiation therapy for his prostate cancer with a total dose of 7020 cGy in 26 treatments given from 9/20/2023 - 10/27/2023.     Assessment & Plan:     1.  The patient has been stable since radiation therapy with mild to moderate improvement of his urinary function.  His PSA showed small decline at this time.  We will check his PSA again in 3 months and office follow-up    2.  Continue follow-up with Dr. López Barahona, PCP and Dr. Lugo, urology as planned    Face to face time  20 minutes with > 80% spent on consultation, education and coordination of care.      Lacey Young MD  Department of Radiation Oncology   [facility]  Tel: [phone]  Page: [phone]    [facility]  [address]  [place] [address]     [address] [place] [address]    CC:  Patient Care Team:  López Barahona MD as PCP - General  López Barahona MD as Assigned PCP  Burak Lugo MD as MD (Urology)  Burak Lugo MD as Assigned Surgical  Provider  Lacey Young MD as MD (Radiation Oncology)  Linda Ochoa MD as MD (Cardiovascular Disease)  Lacey Young MD as Assigned Cancer Care Provider  Linda Ochoa MD as Assigned Heart and Vascular Provider

## 2023-11-30 ENCOUNTER — HOSPITAL ENCOUNTER (OUTPATIENT)
Dept: CARDIAC REHAB | Facility: CLINIC | Age: 73
Discharge: HOME | End: 2023-11-30
Attending: INTERNAL MEDICINE
Payer: MEDICARE

## 2023-11-30 DIAGNOSIS — E78.2 MIXED HYPERLIPIDEMIA: Primary | ICD-10-CM

## 2023-11-30 PROCEDURE — 93798 PHYS/QHP OP CAR RHAB W/ECG: CPT

## 2023-11-30 RX ORDER — EZETIMIBE 10 MG/1
10 TABLET ORAL DAILY
Qty: 90 TABLET | Refills: 3 | Status: SHIPPED | OUTPATIENT
Start: 2023-11-30

## 2023-12-05 ENCOUNTER — HOSPITAL ENCOUNTER (OUTPATIENT)
Dept: CARDIAC REHAB | Facility: CLINIC | Age: 73
Discharge: HOME | End: 2023-12-05
Attending: INTERNAL MEDICINE
Payer: MEDICARE

## 2023-12-05 PROCEDURE — 93798 PHYS/QHP OP CAR RHAB W/ECG: CPT

## 2023-12-06 ENCOUNTER — TELEPHONE (OUTPATIENT)
Dept: CARDIOLOGY | Facility: CLINIC | Age: 73
End: 2023-12-06
Payer: MEDICARE

## 2023-12-06 DIAGNOSIS — E78.2 MIXED HYPERLIPIDEMIA: Primary | ICD-10-CM

## 2023-12-06 DIAGNOSIS — I49.3 PVC'S (PREMATURE VENTRICULAR CONTRACTIONS): ICD-10-CM

## 2023-12-06 DIAGNOSIS — R00.1 BRADYCARDIA: ICD-10-CM

## 2023-12-06 DIAGNOSIS — E03.4 HYPOTHYROIDISM DUE TO ACQUIRED ATROPHY OF THYROID: ICD-10-CM

## 2023-12-06 DIAGNOSIS — I25.10 CORONARY ARTERY DISEASE INVOLVING NATIVE CORONARY ARTERY OF NATIVE HEART WITHOUT ANGINA PECTORIS: ICD-10-CM

## 2023-12-06 NOTE — TELEPHONE ENCOUNTER
----- Message from Linda Ochoa MD sent at 2023 12:51 PM CST -----  Regarding: FW: Cardiac rehab patient  Marcial Dumas,  Please see note from Janice below regarding PVCs.  Could we make arrangements for Him to have a 24-hour Holter monitor to quantify PVC C burden.  Thanks  ----- Message -----  From: Janice Crum EP  Sent: 2023  10:56 AM CST  To: Linda Ochoa MD  Subject: Cardiac rehab patient                            Dr. Ochoa,  Our mutual patient Loy Reid : 52 was in cardiac rehab today, patient had freq PVCs during cardiac rehab.  Patient denies symptoms.  Strips in Epic.    Thanks,  Janice  Cardiac Rehab Therapist

## 2023-12-08 ENCOUNTER — HOSPITAL ENCOUNTER (OUTPATIENT)
Dept: CARDIAC REHAB | Facility: CLINIC | Age: 73
Discharge: HOME | End: 2023-12-08
Attending: INTERNAL MEDICINE
Payer: MEDICARE

## 2023-12-08 PROCEDURE — 93798 PHYS/QHP OP CAR RHAB W/ECG: CPT

## 2023-12-09 DIAGNOSIS — I10 ESSENTIAL HYPERTENSION: ICD-10-CM

## 2023-12-11 RX ORDER — METOPROLOL SUCCINATE 50 MG/1
50 TABLET, EXTENDED RELEASE ORAL DAILY
Qty: 90 TABLET | Refills: 2 | Status: SHIPPED | OUTPATIENT
Start: 2023-12-11 | End: 2024-09-04

## 2023-12-12 ENCOUNTER — HOSPITAL ENCOUNTER (OUTPATIENT)
Dept: CARDIAC REHAB | Facility: CLINIC | Age: 73
Discharge: HOME | End: 2023-12-12
Attending: INTERNAL MEDICINE
Payer: MEDICARE

## 2023-12-12 PROCEDURE — 93798 PHYS/QHP OP CAR RHAB W/ECG: CPT

## 2023-12-14 ENCOUNTER — HOSPITAL ENCOUNTER (OUTPATIENT)
Dept: CARDIOLOGY | Facility: CLINIC | Age: 73
Discharge: HOME | End: 2023-12-14
Attending: INTERNAL MEDICINE | Admitting: INTERNAL MEDICINE
Payer: MEDICARE

## 2023-12-14 ENCOUNTER — HOSPITAL ENCOUNTER (OUTPATIENT)
Dept: CARDIAC REHAB | Facility: CLINIC | Age: 73
Discharge: HOME | End: 2023-12-14
Attending: INTERNAL MEDICINE
Payer: MEDICARE

## 2023-12-14 DIAGNOSIS — I25.10 CORONARY ARTERY DISEASE INVOLVING NATIVE CORONARY ARTERY OF NATIVE HEART WITHOUT ANGINA PECTORIS: ICD-10-CM

## 2023-12-14 DIAGNOSIS — R00.1 BRADYCARDIA: ICD-10-CM

## 2023-12-14 DIAGNOSIS — E78.2 MIXED HYPERLIPIDEMIA: ICD-10-CM

## 2023-12-14 DIAGNOSIS — E03.4 HYPOTHYROIDISM DUE TO ACQUIRED ATROPHY OF THYROID: ICD-10-CM

## 2023-12-14 DIAGNOSIS — I49.3 PVC'S (PREMATURE VENTRICULAR CONTRACTIONS): ICD-10-CM

## 2023-12-14 PROCEDURE — 93226 XTRNL ECG REC<48 HR SCAN A/R: CPT

## 2023-12-14 PROCEDURE — 93798 PHYS/QHP OP CAR RHAB W/ECG: CPT

## 2023-12-18 ENCOUNTER — OFFICE VISIT (OUTPATIENT)
Dept: CARDIOLOGY | Facility: CLINIC | Age: 73
End: 2023-12-18
Payer: MEDICARE

## 2023-12-18 VITALS
BODY MASS INDEX: 29.13 KG/M2 | WEIGHT: 188.8 LBS | OXYGEN SATURATION: 98 % | HEART RATE: 53 BPM | RESPIRATION RATE: 16 BRPM | SYSTOLIC BLOOD PRESSURE: 102 MMHG | DIASTOLIC BLOOD PRESSURE: 42 MMHG

## 2023-12-18 DIAGNOSIS — I25.10 CORONARY ARTERY DISEASE INVOLVING NATIVE CORONARY ARTERY OF NATIVE HEART WITHOUT ANGINA PECTORIS: Primary | ICD-10-CM

## 2023-12-18 DIAGNOSIS — I49.3 PVC'S (PREMATURE VENTRICULAR CONTRACTIONS): ICD-10-CM

## 2023-12-18 DIAGNOSIS — I10 HYPERTENSION, UNSPECIFIED TYPE: ICD-10-CM

## 2023-12-18 DIAGNOSIS — E78.5 DYSLIPIDEMIA: ICD-10-CM

## 2023-12-18 PROCEDURE — 99214 OFFICE O/P EST MOD 30 MIN: CPT | Performed by: INTERNAL MEDICINE

## 2023-12-18 NOTE — LETTER
12/18/2023    CUATE KING MD  [address] [place] [address]    RE: Loy Reid       Dear Colleague,     I had the pleasure of seeing Loy Reid in the [facility].         [facility] [address], [place] [address]   [email or web address]   OFFICE: [phone]            Impression and Plan     1.  Coronary artery disease.  Loy has known coronary artery disease having had history of unstable ischemic syndrome with PCI with stent placement to the mid LAD in 2010.    Loy underwent repeat angiography 13 September 2023 and had successful PCI entire right coronary from crux to proximal segment with 2 long drug-eluting stents.  (3.5 x 48 Synergy drug-eluting stents x 2).  The circumflex was occluded and filled via collaterals from the left circulation.  Continue aspirin.  Continue clopidogrel.    2.  PVCs.  As noted below, Loy had been noted in cardiac rehab to have evidence of PVCs though asymptomatic.  He had a Holter monitor placed and just turned in the device 48 hours ago with results pending.  Will follow-up results accordingly.  Continue metoprolol as per problem #3.    3.  Hypertension.  Blood pressure is very reasonable in the office today at 102/42 mmHg.  Continue metoprolol succinate 50 mg daily.  Continue lisinopril..     4.  Dyslipidemia.  Lipid profile 27 November 2023 revealed LDL 97 mg/dL and HDL 32 mg/dL.  Continue rosuvastatin 40 mg daily.  Continue ezetimibe.     Tentatively plan on follow-up in 1 year.      35 minutes spent reviewing prior records (including documentation, laboratory studies, cardiac testing/imaging), interview with patient along with physical exam, planning, and subsequent documentation/crafting of note).           History of Present Illness    Once again I would like to thank you again for asking me to participate in the care of your patient, Loy Reid.  As you know, but to reiterate for  my own records, Loy Reid is a 73 year old male with known coronary artery disease having had history of unstable ischemic syndrome with PCI with stent placement to the mid LAD in 2010.    Loy underwent repeat angiography 13 September 2023 and had successful PCI entire right coronary from crux to proximal segment with 2 long drug-eluting stents.  (3.5 x 48 Synergy drug-eluting stents x 2).     On interview, the Loy reports no chest pain symptoms concerning for angina.  Breathing has been stable.  He has been noted in cardiac rehab to have evidence of PVCs though patient unaware.    Further review of systems is otherwise negative/noncontributory (medical record and 13 point review of systems reviewed as well and pertinent positives noted).         Cardiac Diagnostics      Regadenoson MRI 31 August 2023 (precoronary angiogram):  Pharmacological Regadenoson stress cardiac MRI is positive for inducible myocardial ischemia in inferolateral wall.  Pharmacological Regadenoson stress ECG is nondiagnostic due to baseline ECG abnormality.  Normal left ventricular size, mild LVH and mild hypokinesis in inferolateral wall. The calculated left ventricular ejection fraction is 53%.  Previous nontransmural myocardial infarction in inferolateral wall from basal to mid segment with endomyocardial scare 40-50% of wall thickness. Rest myocardium is viable.  Normal right ventricular size and systolic function.  No obvious valvular disease.    Coronary angiogram 13 September 2023:  Mild in-stent restenosis in proximal one fourth of mid LAD stent.  Residual lumen appears good.  Mild disease distal to the stent but no severe stenoses.  Total occlusion of left circumflex after small first OM branch.  Occlusion distance appears at least 3 to 4 cm.  Distal vessel fills from left-sided collaterals.  Large dominant RCA with diffuse disease throughout with focal 90 to 95% stenosis distally just before the PDA takeoff.  Successful PCI  entire right coronary from crux to proximal segment with 2 long drug-eluting stents.  SAUL-3 flow flow post PCI with 10% residual narrowing.    CT scan of chest 6 January 2023:   Couple tiny pulmonary nodules.  Mild airway thickening.  Emphysema.  Coronary artery calcification: Severe pronounced in the LAD.            Physical Examination       /42 (BP Location: Left arm, Patient Position: Sitting, Cuff Size: Adult Regular)   Pulse 53   Resp 16   Wt 85.6 kg (188 lb 12.8 oz)   SpO2 98%   BMI 29.13 kg/m          Wt Readings from Last 3 Encounters:   12/18/23 85.6 kg (188 lb 12.8 oz)   11/29/23 84.6 kg (186 lb 9.6 oz)   10/18/23 84.2 kg (185 lb 9.6 oz)       The patient is alert and oriented times three. Sclerae are anicteric. Mucosal membranes are moist. Jugular venous pressure is normal. No significant adenopathy/thyromegally appreciated. Lungs are clear with good expansion. On cardiovascular exam, the patient has a regular S1 and S2. Abdomen is soft and non-tender. Extremities reveal no clubbing, cyanosis, or edema.         Family History/Social History/Risk Factors   Patient does not smoke.  Family history reviewed, and family history includes Cerebrovascular Disease in his father and mother; Coronary Artery Disease in his mother and sister; Heart Disease in his brother and father.        Medical History  Surgical History Family History Social History   Past Medical History:   Diagnosis Date    Myocardial infarction (H)     Prostate cancer (H)     PVD (peripheral vascular disease) (H24)     Tobacco use      Past Surgical History:   Procedure Laterality Date    CV CORONARY ANGIOGRAM N/A 9/13/2023    Procedure: Coronary Angiogram;  Surgeon: Thony Banda MD;  Location: [facility] CV    CV LEFT HEART CATH N/A 9/13/2023    Procedure: Left Heart Catheterization;  Surgeon: Thony Banda MD;  Location: [facility] CV    CV PCI STENT DRUG ELUTING N/A 9/13/2023    Procedure: Percutaneous  Coronary Intervention Stent;  Surgeon: Thony Banda MD;  Location: [facility] LAB CV    FOOT FRACTURE SURGERY       Family History   Problem Relation Age of Onset    Cerebrovascular Disease Mother     Coronary Artery Disease Mother     Cerebrovascular Disease Father     Heart Disease Father     Coronary Artery Disease Sister     Heart Disease Brother         Social History     Socioeconomic History    Marital status:      Spouse name: Not on file    Number of children: Not on file    Years of education: Not on file    Highest education level: Not on file   Occupational History    Not on file   Tobacco Use    Smoking status: Former     Types: Cigarettes     Start date: 2022     Quit date: 2023     Years since quittin.3    Smokeless tobacco: Never    Tobacco comments:     Has 1 cigarette every so often   Substance and Sexual Activity    Alcohol use: Not on file    Drug use: Not on file    Sexual activity: Not on file   Other Topics Concern    Not on file   Social History Narrative    Not on file     Social Determinants of Health     Financial Resource Strain: Not on file   Food Insecurity: Not on file   Transportation Needs: Not on file   Physical Activity: Not on file   Stress: Not on file   Social Connections: Not on file   Interpersonal Safety: Not on file   Housing Stability: Not on file           Medications  Allergies   Current Outpatient Medications   Medication Sig Dispense Refill    albuterol (PROAIR HFA/PROVENTIL HFA/VENTOLIN HFA) 108 (90 Base) MCG/ACT inhaler Inhale 2 puffs into the lungs every 6 hours as needed for shortness of breath, wheezing or cough 18 g 3    aspirin 81 MG EC tablet Take 81 mg by mouth daily      clopidogrel (PLAVIX) 75 MG tablet Take 1 tablet (75 mg) by mouth daily 90 tablet 3    ezetimibe (ZETIA) 10 MG tablet Take 1 tablet (10 mg) by mouth daily 90 tablet 3    levothyroxine (SYNTHROID/LEVOTHROID) 112 MCG tablet Take 1 tablet (112 mcg) by mouth daily 90 tablet  "3    lisinopril (ZESTRIL) 5 MG tablet Take 1 tablet (5 mg) by mouth daily 90 tablet 3    metoprolol succinate ER (TOPROL XL) 50 MG 24 hr tablet TAKE 1 TABLET(50 MG) BY MOUTH DAILY 90 tablet 2    rosuvastatin (CRESTOR) 40 MG tablet Take 1 tablet (40 mg) by mouth daily 90 tablet 3    tamsulosin (FLOMAX) 0.4 MG capsule Take 1 capsule (0.4 mg) by mouth daily 60 capsule 1    nitroGLYcerin (NITROSTAT) 0.3 MG sublingual tablet Place 1 tablet (0.3 mg) under the tongue every 5 minutes as needed for chest pain (Patient not taking: Reported on 10/4/2023) 20 tablet 3     No Known Allergies       Lab Results    Chemistry/lipid CBC Cardiac Enzymes/BNP/TSH/INR   Recent Labs   Lab Test 11/27/23  1117   CHOL 151   HDL 32*   LDL 97   TRIG 108     Recent Labs   Lab Test 11/27/23  1117 09/13/23  0713 12/08/22  1047   LDL 97 77 94     Recent Labs   Lab Test 09/13/23  0713      POTASSIUM 4.2   CHLORIDE 103   CO2 25   *   BUN 16.7   CR 1.31*   GFRESTIMATED 58*   CAROL 9.1     Recent Labs   Lab Test 09/13/23  0713 09/08/23  1108 05/31/23  1151   CR 1.31* 1.24* 1.4*     Recent Labs   Lab Test 12/08/22  1047   A1C 6.4*          Recent Labs   Lab Test 09/13/23  0713   WBC 11.8*   HGB 14.4   HCT 45.3   MCV 87        Recent Labs   Lab Test 09/13/23  0713 09/08/23  1108 12/08/22  1047   HGB 14.4 14.4 15.1    No results for input(s): \"TROPONINI\" in the last 66078 hours.  No results for input(s): \"BNP\", \"NTBNPI\", \"NTBNP\" in the last 61120 hours.  Recent Labs   Lab Test 11/27/23  1117   TSH 2.62     No results for input(s): \"INR\" in the last 00396 hours.       Medications  Allergies   Current Outpatient Medications   Medication Sig Dispense Refill    albuterol (PROAIR HFA/PROVENTIL HFA/VENTOLIN HFA) 108 (90 Base) MCG/ACT inhaler Inhale 2 puffs into the lungs every 6 hours as needed for shortness of breath, wheezing or cough 18 g 3    aspirin 81 MG EC tablet Take 81 mg by mouth daily      clopidogrel (PLAVIX) 75 MG tablet Take 1 " "tablet (75 mg) by mouth daily 90 tablet 3    ezetimibe (ZETIA) 10 MG tablet Take 1 tablet (10 mg) by mouth daily 90 tablet 3    levothyroxine (SYNTHROID/LEVOTHROID) 112 MCG tablet Take 1 tablet (112 mcg) by mouth daily 90 tablet 3    lisinopril (ZESTRIL) 5 MG tablet Take 1 tablet (5 mg) by mouth daily 90 tablet 3    metoprolol succinate ER (TOPROL XL) 50 MG 24 hr tablet TAKE 1 TABLET(50 MG) BY MOUTH DAILY 90 tablet 2    rosuvastatin (CRESTOR) 40 MG tablet Take 1 tablet (40 mg) by mouth daily 90 tablet 3    tamsulosin (FLOMAX) 0.4 MG capsule Take 1 capsule (0.4 mg) by mouth daily 60 capsule 1    nitroGLYcerin (NITROSTAT) 0.3 MG sublingual tablet Place 1 tablet (0.3 mg) under the tongue every 5 minutes as needed for chest pain (Patient not taking: Reported on 10/4/2023) 20 tablet 3      No Known Allergies       Lab Results   Lab Results   Component Value Date     09/13/2023    CO2 25 09/13/2023    CO2 27 11/26/2019    BUN 16.7 09/13/2023    BUN 22 11/26/2019     Lab Results   Component Value Date    WBC 11.8 09/13/2023    HGB 14.4 09/13/2023    HCT 45.3 09/13/2023    MCV 87 09/13/2023     09/13/2023     Lab Results   Component Value Date    CHOL 151 11/27/2023    TRIG 108 11/27/2023    HDL 32 11/27/2023     No results found for: \"INR\"  No results found for: \"BNP\"  No results found for: \"CKTOTAL\", \"CKMB\", \"TROPONINI\"  Lab Results   Component Value Date    TSH 2.62 11/27/2023                      Thank you for allowing me to participate in the care of your patient.      Sincerely,     Linda Ochoa MD      [facility]  cc:   No referring provider defined for this encounter.      "

## 2023-12-18 NOTE — PROGRESS NOTES
[facility] [address], [place] [address]   [email or web address]   OFFICE: [phone]            Impression and Plan     1.  Coronary artery disease.  Loy has known coronary artery disease having had history of unstable ischemic syndrome with PCI with stent placement to the mid LAD in 2010.    Loy underwent repeat angiography 13 September 2023 and had successful PCI entire right coronary from crux to proximal segment with 2 long drug-eluting stents.  (3.5 x 48 Synergy drug-eluting stents x 2).  The circumflex was occluded and filled via collaterals from the left circulation.  Continue aspirin.  Continue clopidogrel.    2.  PVCs.  As noted below, Loy had been noted in cardiac rehab to have evidence of PVCs though asymptomatic.  He had a Holter monitor placed and just turned in the device 48 hours ago with results pending.  Will follow-up results accordingly.  Continue metoprolol as per problem #3.    3.  Hypertension.  Blood pressure is very reasonable in the office today at 102/42 mmHg.  Continue metoprolol succinate 50 mg daily.  Continue lisinopril..     4.  Dyslipidemia.  Lipid profile 27 November 2023 revealed LDL 97 mg/dL and HDL 32 mg/dL.  Continue rosuvastatin 40 mg daily.  Continue ezetimibe.     Tentatively plan on follow-up in 1 year.      35 minutes spent reviewing prior records (including documentation, laboratory studies, cardiac testing/imaging), interview with patient along with physical exam, planning, and subsequent documentation/crafting of note).           History of Present Illness    Once again I would like to thank you again for asking me to participate in the care of your patient, Loy Reid.  As you know, but to reiterate for my own records, oLy Reid is a 73 year old male with known coronary artery disease having had history of unstable ischemic syndrome with PCI with stent placement to the mid LAD in 2010.    Loy  underwent repeat angiography 13 September 2023 and had successful PCI entire right coronary from crux to proximal segment with 2 long drug-eluting stents.  (3.5 x 48 Synergy drug-eluting stents x 2).     On interview, the Loy reports no chest pain symptoms concerning for angina.  Breathing has been stable.  He has been noted in cardiac rehab to have evidence of PVCs though patient unaware.    Further review of systems is otherwise negative/noncontributory (medical record and 13 point review of systems reviewed as well and pertinent positives noted).         Cardiac Diagnostics      Regadenoson MRI 31 August 2023 (precoronary angiogram):  Pharmacological Regadenoson stress cardiac MRI is positive for inducible myocardial ischemia in inferolateral wall.  Pharmacological Regadenoson stress ECG is nondiagnostic due to baseline ECG abnormality.  Normal left ventricular size, mild LVH and mild hypokinesis in inferolateral wall. The calculated left ventricular ejection fraction is 53%.  Previous nontransmural myocardial infarction in inferolateral wall from basal to mid segment with endomyocardial scare 40-50% of wall thickness. Rest myocardium is viable.  Normal right ventricular size and systolic function.  No obvious valvular disease.    Coronary angiogram 13 September 2023:  Mild in-stent restenosis in proximal one fourth of mid LAD stent.  Residual lumen appears good.  Mild disease distal to the stent but no severe stenoses.  Total occlusion of left circumflex after small first OM branch.  Occlusion distance appears at least 3 to 4 cm.  Distal vessel fills from left-sided collaterals.  Large dominant RCA with diffuse disease throughout with focal 90 to 95% stenosis distally just before the PDA takeoff.  Successful PCI entire right coronary from crux to proximal segment with 2 long drug-eluting stents.  SAUL-3 flow flow post PCI with 10% residual narrowing.    CT scan of chest 6 January 2023:   Couple tiny pulmonary  nodules.  Mild airway thickening.  Emphysema.  Coronary artery calcification: Severe pronounced in the LAD.            Physical Examination       /42 (BP Location: Left arm, Patient Position: Sitting, Cuff Size: Adult Regular)   Pulse 53   Resp 16   Wt 85.6 kg (188 lb 12.8 oz)   SpO2 98%   BMI 29.13 kg/m          Wt Readings from Last 3 Encounters:   12/18/23 85.6 kg (188 lb 12.8 oz)   11/29/23 84.6 kg (186 lb 9.6 oz)   10/18/23 84.2 kg (185 lb 9.6 oz)       The patient is alert and oriented times three. Sclerae are anicteric. Mucosal membranes are moist. Jugular venous pressure is normal. No significant adenopathy/thyromegally appreciated. Lungs are clear with good expansion. On cardiovascular exam, the patient has a regular S1 and S2. Abdomen is soft and non-tender. Extremities reveal no clubbing, cyanosis, or edema.         Family History/Social History/Risk Factors   Patient does not smoke.  Family history reviewed, and family history includes Cerebrovascular Disease in his father and mother; Coronary Artery Disease in his mother and sister; Heart Disease in his brother and father.        Medical History  Surgical History Family History Social History   Past Medical History:   Diagnosis Date    Myocardial infarction (H)     Prostate cancer (H)     PVD (peripheral vascular disease) (H24)     Tobacco use      Past Surgical History:   Procedure Laterality Date    CV CORONARY ANGIOGRAM N/A 9/13/2023    Procedure: Coronary Angiogram;  Surgeon: Thony Banda MD;  Location: [facility] CV    CV LEFT HEART CATH N/A 9/13/2023    Procedure: Left Heart Catheterization;  Surgeon: Thony Banda MD;  Location: [facility] CV    CV PCI STENT DRUG ELUTING N/A 9/13/2023    Procedure: Percutaneous Coronary Intervention Stent;  Surgeon: Thony Banda MD;  Location: [facility] CV    FOOT FRACTURE SURGERY       Family History   Problem Relation Age of Onset    Cerebrovascular Disease Mother      Coronary Artery Disease Mother     Cerebrovascular Disease Father     Heart Disease Father     Coronary Artery Disease Sister     Heart Disease Brother         Social History     Socioeconomic History    Marital status:      Spouse name: Not on file    Number of children: Not on file    Years of education: Not on file    Highest education level: Not on file   Occupational History    Not on file   Tobacco Use    Smoking status: Former     Types: Cigarettes     Start date: 2022     Quit date: 2023     Years since quittin.3    Smokeless tobacco: Never    Tobacco comments:     Has 1 cigarette every so often   Substance and Sexual Activity    Alcohol use: Not on file    Drug use: Not on file    Sexual activity: Not on file   Other Topics Concern    Not on file   Social History Narrative    Not on file     Social Determinants of Health     Financial Resource Strain: Not on file   Food Insecurity: Not on file   Transportation Needs: Not on file   Physical Activity: Not on file   Stress: Not on file   Social Connections: Not on file   Interpersonal Safety: Not on file   Housing Stability: Not on file           Medications  Allergies   Current Outpatient Medications   Medication Sig Dispense Refill    albuterol (PROAIR HFA/PROVENTIL HFA/VENTOLIN HFA) 108 (90 Base) MCG/ACT inhaler Inhale 2 puffs into the lungs every 6 hours as needed for shortness of breath, wheezing or cough 18 g 3    aspirin 81 MG EC tablet Take 81 mg by mouth daily      clopidogrel (PLAVIX) 75 MG tablet Take 1 tablet (75 mg) by mouth daily 90 tablet 3    ezetimibe (ZETIA) 10 MG tablet Take 1 tablet (10 mg) by mouth daily 90 tablet 3    levothyroxine (SYNTHROID/LEVOTHROID) 112 MCG tablet Take 1 tablet (112 mcg) by mouth daily 90 tablet 3    lisinopril (ZESTRIL) 5 MG tablet Take 1 tablet (5 mg) by mouth daily 90 tablet 3    metoprolol succinate ER (TOPROL XL) 50 MG 24 hr tablet TAKE 1 TABLET(50 MG) BY MOUTH DAILY 90 tablet 2     "rosuvastatin (CRESTOR) 40 MG tablet Take 1 tablet (40 mg) by mouth daily 90 tablet 3    tamsulosin (FLOMAX) 0.4 MG capsule Take 1 capsule (0.4 mg) by mouth daily 60 capsule 1    nitroGLYcerin (NITROSTAT) 0.3 MG sublingual tablet Place 1 tablet (0.3 mg) under the tongue every 5 minutes as needed for chest pain (Patient not taking: Reported on 10/4/2023) 20 tablet 3     No Known Allergies       Lab Results    Chemistry/lipid CBC Cardiac Enzymes/BNP/TSH/INR   Recent Labs   Lab Test 11/27/23  1117   CHOL 151   HDL 32*   LDL 97   TRIG 108     Recent Labs   Lab Test 11/27/23  1117 09/13/23  0713 12/08/22  1047   LDL 97 77 94     Recent Labs   Lab Test 09/13/23  0713      POTASSIUM 4.2   CHLORIDE 103   CO2 25   *   BUN 16.7   CR 1.31*   GFRESTIMATED 58*   CAROL 9.1     Recent Labs   Lab Test 09/13/23  0713 09/08/23  1108 05/31/23  1151   CR 1.31* 1.24* 1.4*     Recent Labs   Lab Test 12/08/22  1047   A1C 6.4*          Recent Labs   Lab Test 09/13/23  0713   WBC 11.8*   HGB 14.4   HCT 45.3   MCV 87        Recent Labs   Lab Test 09/13/23  0713 09/08/23  1108 12/08/22  1047   HGB 14.4 14.4 15.1    No results for input(s): \"TROPONINI\" in the last 92603 hours.  No results for input(s): \"BNP\", \"NTBNPI\", \"NTBNP\" in the last 25466 hours.  Recent Labs   Lab Test 11/27/23  1117   TSH 2.62     No results for input(s): \"INR\" in the last 21162 hours.       Medications  Allergies   Current Outpatient Medications   Medication Sig Dispense Refill    albuterol (PROAIR HFA/PROVENTIL HFA/VENTOLIN HFA) 108 (90 Base) MCG/ACT inhaler Inhale 2 puffs into the lungs every 6 hours as needed for shortness of breath, wheezing or cough 18 g 3    aspirin 81 MG EC tablet Take 81 mg by mouth daily      clopidogrel (PLAVIX) 75 MG tablet Take 1 tablet (75 mg) by mouth daily 90 tablet 3    ezetimibe (ZETIA) 10 MG tablet Take 1 tablet (10 mg) by mouth daily 90 tablet 3    levothyroxine (SYNTHROID/LEVOTHROID) 112 MCG tablet Take 1 tablet " "(112 mcg) by mouth daily 90 tablet 3    lisinopril (ZESTRIL) 5 MG tablet Take 1 tablet (5 mg) by mouth daily 90 tablet 3    metoprolol succinate ER (TOPROL XL) 50 MG 24 hr tablet TAKE 1 TABLET(50 MG) BY MOUTH DAILY 90 tablet 2    rosuvastatin (CRESTOR) 40 MG tablet Take 1 tablet (40 mg) by mouth daily 90 tablet 3    tamsulosin (FLOMAX) 0.4 MG capsule Take 1 capsule (0.4 mg) by mouth daily 60 capsule 1    nitroGLYcerin (NITROSTAT) 0.3 MG sublingual tablet Place 1 tablet (0.3 mg) under the tongue every 5 minutes as needed for chest pain (Patient not taking: Reported on 10/4/2023) 20 tablet 3      No Known Allergies       Lab Results   Lab Results   Component Value Date     09/13/2023    CO2 25 09/13/2023    CO2 27 11/26/2019    BUN 16.7 09/13/2023    BUN 22 11/26/2019     Lab Results   Component Value Date    WBC 11.8 09/13/2023    HGB 14.4 09/13/2023    HCT 45.3 09/13/2023    MCV 87 09/13/2023     09/13/2023     Lab Results   Component Value Date    CHOL 151 11/27/2023    TRIG 108 11/27/2023    HDL 32 11/27/2023     No results found for: \"INR\"  No results found for: \"BNP\"  No results found for: \"CKTOTAL\", \"CKMB\", \"TROPONINI\"  Lab Results   Component Value Date    TSH 2.62 11/27/2023                  "

## 2023-12-19 ENCOUNTER — HOSPITAL ENCOUNTER (OUTPATIENT)
Dept: CARDIAC REHAB | Facility: CLINIC | Age: 73
Discharge: HOME | End: 2023-12-19
Attending: INTERNAL MEDICINE
Payer: MEDICARE

## 2023-12-19 PROCEDURE — 93227 XTRNL ECG REC<48 HR R&I: CPT | Performed by: INTERNAL MEDICINE

## 2023-12-19 PROCEDURE — 93798 PHYS/QHP OP CAR RHAB W/ECG: CPT

## 2023-12-21 ENCOUNTER — HOSPITAL ENCOUNTER (OUTPATIENT)
Dept: CARDIAC REHAB | Facility: CLINIC | Age: 73
Discharge: HOME | End: 2023-12-21
Attending: INTERNAL MEDICINE
Payer: MEDICARE

## 2023-12-21 PROCEDURE — 93798 PHYS/QHP OP CAR RHAB W/ECG: CPT

## 2023-12-26 ENCOUNTER — HOSPITAL ENCOUNTER (OUTPATIENT)
Dept: CARDIAC REHAB | Facility: CLINIC | Age: 73
Discharge: HOME | End: 2023-12-26
Attending: INTERNAL MEDICINE
Payer: MEDICARE

## 2023-12-26 PROCEDURE — 93798 PHYS/QHP OP CAR RHAB W/ECG: CPT

## 2023-12-28 ENCOUNTER — HOSPITAL ENCOUNTER (OUTPATIENT)
Dept: CARDIAC REHAB | Facility: CLINIC | Age: 73
Discharge: HOME | End: 2023-12-28
Attending: INTERNAL MEDICINE
Payer: MEDICARE

## 2023-12-28 PROCEDURE — 93798 PHYS/QHP OP CAR RHAB W/ECG: CPT

## 2024-01-02 ENCOUNTER — HOSPITAL ENCOUNTER (OUTPATIENT)
Dept: CARDIAC REHAB | Facility: CLINIC | Age: 74
Discharge: HOME | End: 2024-01-02
Attending: INTERNAL MEDICINE
Payer: MEDICARE

## 2024-01-02 PROCEDURE — 93798 PHYS/QHP OP CAR RHAB W/ECG: CPT

## 2024-01-09 ENCOUNTER — HOSPITAL ENCOUNTER (OUTPATIENT)
Dept: CARDIAC REHAB | Facility: CLINIC | Age: 74
Discharge: HOME | End: 2024-01-09
Attending: INTERNAL MEDICINE
Payer: MEDICARE

## 2024-01-09 PROCEDURE — 93798 PHYS/QHP OP CAR RHAB W/ECG: CPT

## 2024-01-10 ENCOUNTER — HOSPITAL ENCOUNTER (OUTPATIENT)
Dept: CARDIOLOGY | Facility: CLINIC | Age: 74
Discharge: HOME | End: 2024-01-10
Attending: INTERNAL MEDICINE | Admitting: INTERNAL MEDICINE
Payer: MEDICARE

## 2024-01-10 DIAGNOSIS — E03.4 HYPOTHYROIDISM DUE TO ACQUIRED ATROPHY OF THYROID: ICD-10-CM

## 2024-01-10 DIAGNOSIS — E78.2 MIXED HYPERLIPIDEMIA: ICD-10-CM

## 2024-01-10 DIAGNOSIS — I49.3 PVC'S (PREMATURE VENTRICULAR CONTRACTIONS): ICD-10-CM

## 2024-01-10 DIAGNOSIS — R00.1 BRADYCARDIA: ICD-10-CM

## 2024-01-10 DIAGNOSIS — I25.10 CORONARY ARTERY DISEASE INVOLVING NATIVE CORONARY ARTERY OF NATIVE HEART WITHOUT ANGINA PECTORIS: ICD-10-CM

## 2024-01-10 PROCEDURE — 93306 TTE W/DOPPLER COMPLETE: CPT

## 2024-01-10 PROCEDURE — 93306 TTE W/DOPPLER COMPLETE: CPT | Mod: 26 | Performed by: INTERNAL MEDICINE

## 2024-01-12 ENCOUNTER — HOSPITAL ENCOUNTER (OUTPATIENT)
Dept: CARDIAC REHAB | Facility: CLINIC | Age: 74
Discharge: HOME | End: 2024-01-12
Attending: INTERNAL MEDICINE
Payer: MEDICARE

## 2024-01-12 PROCEDURE — 93798 PHYS/QHP OP CAR RHAB W/ECG: CPT

## 2024-01-16 ENCOUNTER — HOSPITAL ENCOUNTER (OUTPATIENT)
Dept: CARDIAC REHAB | Facility: CLINIC | Age: 74
Discharge: HOME | End: 2024-01-16
Attending: INTERNAL MEDICINE
Payer: MEDICARE

## 2024-01-16 PROCEDURE — 93798 PHYS/QHP OP CAR RHAB W/ECG: CPT

## 2024-01-17 ENCOUNTER — OFFICE VISIT (OUTPATIENT)
Dept: CARDIOLOGY | Facility: CLINIC | Age: 74
End: 2024-01-17
Attending: INTERNAL MEDICINE
Payer: MEDICARE

## 2024-01-17 VITALS
DIASTOLIC BLOOD PRESSURE: 60 MMHG | BODY MASS INDEX: 28.7 KG/M2 | WEIGHT: 186 LBS | HEART RATE: 64 BPM | OXYGEN SATURATION: 99 % | SYSTOLIC BLOOD PRESSURE: 119 MMHG

## 2024-01-17 DIAGNOSIS — I49.3 PVC'S (PREMATURE VENTRICULAR CONTRACTIONS): Primary | ICD-10-CM

## 2024-01-17 PROCEDURE — 99204 OFFICE O/P NEW MOD 45 MIN: CPT | Performed by: INTERNAL MEDICINE

## 2024-01-17 NOTE — PATIENT INSTRUCTIONS
[facility]  Cardiac Electrophysiology  [address]  [place] [address]   Office: [phone]  Fax: [phone]       Thank you for seeing us in clinic today - it is a pleasure to be a part of your care team.  Below is a summary of our plan from today's visit.      You have been noted to have frequent premature ventricular contractions (PVCs).  We reviewed PVCs and treatment options including observation, medical therapy, and PVC mapping and ablation.   Please do not hesitate to be in touch with our office at [phone] with any questions that may arise.    We will plan for the following:.   - continue metoprolol XL XL 50mg   - annual follow-up with your cardiology team including interval assessment of heart function and PVC burden    Thank you for trusting us with your care,    Cornelius Jacques MD  Clinical Cardiac Electrophysiology  [facility]  [address]  [place] [address]   Office: [phone]  Fax: [phone]

## 2024-01-17 NOTE — PROGRESS NOTES
[facility]  Cardiac Electrophysiology  [address]  [place] [address]   Office: [phone]  Fax: [phone]     Cardiac Electrophysiology Consultation    Patient: Loy Reid   : 1950     Referring Provider: Linda Ochoa MD  Primary Care Provider: López Barahona MD    CHIEF COMPLAINT/REASON FOR VISIT  Premature ventricular contractions    Assessment/Recommendations   Loy Reid is a 73 year old male with premature ventricular contractions, CAD with prior MI and PCIs, HTN, prostate cancer referred by Dr. Ochoa for consultation regarding PVCs.    Premature ventricular contractions - asymptomatic, cardiac MRI 2023 showing LVEF 53% with nontransmural basal-mid inferolateral fibrosis with inducible ischemia; TTE 1/10/2024 post PCI showed LVEF 55-60%.  Unifocal of RBRIA (rS I, R III>II, V3 transition later than SR) morphology consistent with LVOT vs left basal anteroseptal site of origin.  14% burden by 2023 24hr Holter monitoring.    We reviewed PVCs and treatment options including observation, medical therapy, and PVC mapping and ablation.  We reviewed PVC mapping and ablation procedures, risks (including groin bleeding, stroke, tamponade, heart block) and recovery expectations.  We will plan for the following:.   - continue metoprolol XL XL 50mg   - annual follow-up with his cardiology including interval assessment of heart function and PVC burden    Follow up: as above         History of Present Illness   Loy Reid is a 73 year old male with premature ventricular contractions, CAD with prior MI and PCIs, HTN, prostate cancer referred by Dr. Ochoa for consultation regarding PVCs.    Mr. Reid underwent cardiac MRI 2023 showing LVEF 53% with nontransmural basal-mid inferolateral fibrosis with inducible ischemia and underwent PCI 2023.  He was noted to have asymptomatic PVCs while at cardiac rehabilitation - he  notes gradual increase in his functional capacity while undergoing cardiac rehabilitation.  Holter monitoring 12/14/2024 showed 14% PVCs.  Repeat TTE 1/10/2024 showed LVEF 55-60%.    He denies syncope.         Physical Examination  Review of Systems   VITALS: /60 (BP Location: Right arm, Patient Position: Sitting, Cuff Size: Adult Regular)   Pulse 64   Wt 84.4 kg (186 lb)   SpO2 99%   BMI 28.70 kg/m    Wt Readings from Last 3 Encounters:   12/18/23 85.6 kg (188 lb 12.8 oz)   11/29/23 84.6 kg (186 lb 9.6 oz)   10/18/23 84.2 kg (185 lb 9.6 oz)     CONSTITUTIONAL: well nourished, comfortable, no distress  EYES:  Conjunctivae pink, sclerae clear.    E/N/T:  Oral mucosa pink  RESPIRATORY:  Respiratory effort is normal  CARDIOVASCULAR:  regular, rare isolated ectopic beats, normal S1 and S2  GASTROINTESTINAL:  Abdomen without masses or tenderness  EXTREMITIES:  No clubbing or cyanosis.    MUSCULOSKELETAL:  Overall grossly normal muscle strength  SKIN:  Overall, skin warm and dry, no lesions.  NEURO/PSYCH:  Oriented x 3 with normal affect.   Constitutional:  No weight loss or loss of appetite    Eyes:  No difficulty with vision, no double vision, no dry eyes  ENT:  No sore throat, difficulty swallowing; changes in hearing or tinnitus  Cardiovascular: As detailed above  Respiratory:  No cough  Musculoskeletal  No joint pain, muscle aches  Neurologic:  No syncope, lightheadedness, fainting spells   Hematologic: No easy bruising, excessive bleeding tendency   Gastrointestinal:  No jaundice, abdominal pain or abdominal bloating  Genitourinary: No changes in urinary habits, no trouble urinating    Psychiatric: No anxiety or depression      Medical History  Surgical History   Past Medical History:   Diagnosis Date    Myocardial infarction (H)     Prostate cancer (H)     PVD (peripheral vascular disease) (H24)     Tobacco use     Past Surgical History:   Procedure Laterality Date    CV CORONARY ANGIOGRAM N/A 9/13/2023     Procedure: Coronary Angiogram;  Surgeon: Thony Banda MD;  Location: [facility] CV    CV LEFT HEART CATH N/A 2023    Procedure: Left Heart Catheterization;  Surgeon: Thony Banda MD;  Location: [facility] CV    CV PCI STENT DRUG ELUTING N/A 2023    Procedure: Percutaneous Coronary Intervention Stent;  Surgeon: Thony Banda MD;  Location: [facility] CV    FOOT FRACTURE SURGERY           Family History Social History   Family History   Problem Relation Age of Onset    Cerebrovascular Disease Mother     Coronary Artery Disease Mother     Cerebrovascular Disease Father     Heart Disease Father     Coronary Artery Disease Sister     Heart Disease Brother         Social History     Tobacco Use    Smoking status: Former     Types: Cigarettes     Start date: 2022     Quit date: 2023     Years since quittin.4    Smokeless tobacco: Never    Tobacco comments:     Has 1 cigarette every so often         Medications  Allergies     Current Outpatient Medications:     albuterol (PROAIR HFA/PROVENTIL HFA/VENTOLIN HFA) 108 (90 Base) MCG/ACT inhaler, Inhale 2 puffs into the lungs every 6 hours as needed for shortness of breath, wheezing or cough, Disp: 18 g, Rfl: 3    aspirin 81 MG EC tablet, Take 81 mg by mouth daily, Disp: , Rfl:     clopidogrel (PLAVIX) 75 MG tablet, Take 1 tablet (75 mg) by mouth daily, Disp: 90 tablet, Rfl: 3    ezetimibe (ZETIA) 10 MG tablet, Take 1 tablet (10 mg) by mouth daily, Disp: 90 tablet, Rfl: 3    levothyroxine (SYNTHROID/LEVOTHROID) 112 MCG tablet, Take 1 tablet (112 mcg) by mouth daily, Disp: 90 tablet, Rfl: 3    lisinopril (ZESTRIL) 5 MG tablet, Take 1 tablet (5 mg) by mouth daily, Disp: 90 tablet, Rfl: 3    metoprolol succinate ER (TOPROL XL) 50 MG 24 hr tablet, TAKE 1 TABLET(50 MG) BY MOUTH DAILY, Disp: 90 tablet, Rfl: 2    nitroGLYcerin (NITROSTAT) 0.3 MG sublingual tablet, Place 1 tablet (0.3 mg) under the tongue every 5 minutes as needed  "for chest pain (Patient not taking: Reported on 10/4/2023), Disp: 20 tablet, Rfl: 3    rosuvastatin (CRESTOR) 40 MG tablet, Take 1 tablet (40 mg) by mouth daily, Disp: 90 tablet, Rfl: 3    tamsulosin (FLOMAX) 0.4 MG capsule, Take 1 capsule (0.4 mg) by mouth daily, Disp: 60 capsule, Rfl: 1   No Known Allergies       Lab Results    Chemistry CBC Cardiac Enzymes/BNP/TSH/INR   Recent Labs   Lab Test 09/13/23  0713      POTASSIUM 4.2   CHLORIDE 103   CO2 25   *   BUN 16.7   CR 1.31*   GFRESTIMATED 58*   CAROL 9.1     Recent Labs   Lab Test 09/13/23  0713 09/08/23  1108 05/31/23  1151   CR 1.31* 1.24* 1.4*          Recent Labs   Lab Test 09/13/23  0713   WBC 11.8*   HGB 14.4   HCT 45.3   MCV 87        Recent Labs   Lab Test 09/13/23  0713 09/08/23  1108 12/08/22  1047   HGB 14.4 14.4 15.1    No results for input(s): \"TROPONINI\" in the last 37252 hours.  No results for input(s): \"BNP\", \"NTBNPI\", \"NTBNP\" in the last 55689 hours.  Recent Labs   Lab Test 11/27/23  1117   TSH 2.62     No results for input(s): \"INR\" in the last 28532 hours.      Data Review    ECGs (tracings independently reviewed)  8/31/2023 - SR with PVCs 106bpm, RBBB QRS 136ms, PVCs of RBRIA (rS I, R III>II, V3 transition later than SR)      24hr Holter monitoring 12/14/2023 (independently reviewed)  Predominant underlying rhythm was sinus rhythm, 49 to 107 bpm, average 69 bpm.  No sustained tachyarrhythmias.  No atrial fibrillation.  There were no pauses of greater than 3 seconds.  Rare supraventricular ectopic beats (<1%).  High burden of monomorphic premature ventricular contractions (14%). One 4 beat run of accelerated idioventricular rhythm  noted.  Symptom triggers - none.    1/10/2024 TTE  1. Normal left ventricular size and systolic performance with a visually  estimated ejection fraction of 60-65%.  2. No significant valvular heart disease is identified on this study.  3. Normal right ventricular size and systolic " performance.    8/31/2023 cardiac MRI with stress  1.  Pharmacological Regadenoson stress cardiac MRI is positive for inducible myocardial ischemia in  inferolateral wall.  2.  Pharmacological Regadenoson stress ECG is nondiagnostic due to baseline ECG abnormality.  3.  Normal left ventricular size, mild LVH and mild hypokinesis in inferolateral wall.             The calculated left  ventricular ejection fraction is 53%.  4.  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 Rest myocardium is viable.  5.  Normal right ventricular size and systolic function.  6.  No obvious valvular disease.    9/13/2023 coronary angiography and PCI  1.  Mild in-stent restenosis in proximal one fourth of  mid LAD stent.  Residual lumen appears good.  Mild disease distal to the stent but no severe stenoses.  2.  Total occlusion of left circumflex after small first OM branch.  Occlusion distance appears at least 3 to 4 cm.  Distal vessel fills from left-sided collaterals.  3.  Large dominant RCA with diffuse disease throughout with focal 90 to 95% stenosis distally just before the PDA takeoff.  4.  Successful PCI entire right coronary from crux to proximal segment with 2 long drug-eluting stents.  SAUL-3 flow flow post PCI with 10% residual narrowing.       Cc: Linda Ochoa MD, López Barahona MD Amila Dilusha William, MD  1/17/2024  1:35 PM

## 2024-01-17 NOTE — LETTER
2024    LÓPEZ BARAHONA MD  [address] [place] [address]    RE: Loy Reid       Dear Colleague,     I had the pleasure of seeing Loy Reid in the [facility].     [facility]  Cardiac Electrophysiology  [address]  [place] [address]   Office: [phone]  Fax: [phone]     Cardiac Electrophysiology Consultation    Patient: Loy Reid   : 1950     Referring Provider: Linda Ochoa MD  Primary Care Provider: López Barahona MD    CHIEF COMPLAINT/REASON FOR VISIT  Premature ventricular contractions    Assessment/Recommendations   Loy Reid is a 73 year old male with premature ventricular contractions, CAD with prior MI and PCIs, HTN, prostate cancer referred by Dr. Ochoa for consultation regarding PVCs.    Premature ventricular contractions - asymptomatic, cardiac MRI 2023 showing LVEF 53% with nontransmural basal-mid inferolateral fibrosis with inducible ischemia; TTE 1/10/2024 post PCI showed LVEF 55-60%.  Unifocal of RBRIA (rS I, R III>II, V3 transition later than SR) morphology consistent with LVOT vs left basal anteroseptal site of origin.  14% burden by 2023 24hr Holter monitoring.    We reviewed PVCs and treatment options including observation, medical therapy, and PVC mapping and ablation.  We reviewed PVC mapping and ablation procedures, risks (including groin bleeding, stroke, tamponade, heart block) and recovery expectations.  We will plan for the following:.   - continue metoprolol XL XL 50mg   - annual follow-up with his cardiology including interval assessment of heart function and PVC burden    Follow up: as above         History of Present Illness   Loy Reid is a 73 year old male with premature ventricular contractions, CAD with prior MI and PCIs, HTN, prostate cancer referred by Dr. Ochoa for consultation regarding PVCs.    Mr. Reid underwent cardiac MRI 2023  showing LVEF 53% with nontransmural basal-mid inferolateral fibrosis with inducible ischemia and underwent PCI 9/13/2023.  He was noted to have asymptomatic PVCs while at cardiac rehabilitation - he notes gradual increase in his functional capacity while undergoing cardiac rehabilitation.  Holter monitoring 12/14/2024 showed 14% PVCs.  Repeat TTE 1/10/2024 showed LVEF 55-60%.    He denies syncope.         Physical Examination  Review of Systems   VITALS: /60 (BP Location: Right arm, Patient Position: Sitting, Cuff Size: Adult Regular)   Pulse 64   Wt 84.4 kg (186 lb)   SpO2 99%   BMI 28.70 kg/m    Wt Readings from Last 3 Encounters:   12/18/23 85.6 kg (188 lb 12.8 oz)   11/29/23 84.6 kg (186 lb 9.6 oz)   10/18/23 84.2 kg (185 lb 9.6 oz)     CONSTITUTIONAL: well nourished, comfortable, no distress  EYES:  Conjunctivae pink, sclerae clear.    E/N/T:  Oral mucosa pink  RESPIRATORY:  Respiratory effort is normal  CARDIOVASCULAR:  regular, rare isolated ectopic beats, normal S1 and S2  GASTROINTESTINAL:  Abdomen without masses or tenderness  EXTREMITIES:  No clubbing or cyanosis.    MUSCULOSKELETAL:  Overall grossly normal muscle strength  SKIN:  Overall, skin warm and dry, no lesions.  NEURO/PSYCH:  Oriented x 3 with normal affect.   Constitutional:  No weight loss or loss of appetite    Eyes:  No difficulty with vision, no double vision, no dry eyes  ENT:  No sore throat, difficulty swallowing; changes in hearing or tinnitus  Cardiovascular: As detailed above  Respiratory:  No cough  Musculoskeletal  No joint pain, muscle aches  Neurologic:  No syncope, lightheadedness, fainting spells   Hematologic: No easy bruising, excessive bleeding tendency   Gastrointestinal:  No jaundice, abdominal pain or abdominal bloating  Genitourinary: No changes in urinary habits, no trouble urinating    Psychiatric: No anxiety or depression      Medical History  Surgical History   Past Medical History:   Diagnosis Date     Myocardial infarction (H)     Prostate cancer (H)     PVD (peripheral vascular disease) (H24)     Tobacco use     Past Surgical History:   Procedure Laterality Date    CV CORONARY ANGIOGRAM N/A 2023    Procedure: Coronary Angiogram;  Surgeon: Thony Banda MD;  Location: [facility] CV    CV LEFT HEART CATH N/A 2023    Procedure: Left Heart Catheterization;  Surgeon: Thony Banda MD;  Location: [facility] CV    CV PCI STENT DRUG ELUTING N/A 2023    Procedure: Percutaneous Coronary Intervention Stent;  Surgeon: Thony Banda MD;  Location: [facility] CV    FOOT FRACTURE SURGERY           Family History Social History   Family History   Problem Relation Age of Onset    Cerebrovascular Disease Mother     Coronary Artery Disease Mother     Cerebrovascular Disease Father     Heart Disease Father     Coronary Artery Disease Sister     Heart Disease Brother         Social History     Tobacco Use    Smoking status: Former     Types: Cigarettes     Start date: 2022     Quit date: 2023     Years since quittin.4    Smokeless tobacco: Never    Tobacco comments:     Has 1 cigarette every so often         Medications  Allergies     Current Outpatient Medications:     albuterol (PROAIR HFA/PROVENTIL HFA/VENTOLIN HFA) 108 (90 Base) MCG/ACT inhaler, Inhale 2 puffs into the lungs every 6 hours as needed for shortness of breath, wheezing or cough, Disp: 18 g, Rfl: 3    aspirin 81 MG EC tablet, Take 81 mg by mouth daily, Disp: , Rfl:     clopidogrel (PLAVIX) 75 MG tablet, Take 1 tablet (75 mg) by mouth daily, Disp: 90 tablet, Rfl: 3    ezetimibe (ZETIA) 10 MG tablet, Take 1 tablet (10 mg) by mouth daily, Disp: 90 tablet, Rfl: 3    levothyroxine (SYNTHROID/LEVOTHROID) 112 MCG tablet, Take 1 tablet (112 mcg) by mouth daily, Disp: 90 tablet, Rfl: 3    lisinopril (ZESTRIL) 5 MG tablet, Take 1 tablet (5 mg) by mouth daily, Disp: 90 tablet, Rfl: 3    metoprolol succinate ER (TOPROL  "XL) 50 MG 24 hr tablet, TAKE 1 TABLET(50 MG) BY MOUTH DAILY, Disp: 90 tablet, Rfl: 2    nitroGLYcerin (NITROSTAT) 0.3 MG sublingual tablet, Place 1 tablet (0.3 mg) under the tongue every 5 minutes as needed for chest pain (Patient not taking: Reported on 10/4/2023), Disp: 20 tablet, Rfl: 3    rosuvastatin (CRESTOR) 40 MG tablet, Take 1 tablet (40 mg) by mouth daily, Disp: 90 tablet, Rfl: 3    tamsulosin (FLOMAX) 0.4 MG capsule, Take 1 capsule (0.4 mg) by mouth daily, Disp: 60 capsule, Rfl: 1   No Known Allergies       Lab Results    Chemistry CBC Cardiac Enzymes/BNP/TSH/INR   Recent Labs   Lab Test 09/13/23  0713      POTASSIUM 4.2   CHLORIDE 103   CO2 25   *   BUN 16.7   CR 1.31*   GFRESTIMATED 58*   CAROL 9.1     Recent Labs   Lab Test 09/13/23  0713 09/08/23  1108 05/31/23  1151   CR 1.31* 1.24* 1.4*          Recent Labs   Lab Test 09/13/23  0713   WBC 11.8*   HGB 14.4   HCT 45.3   MCV 87        Recent Labs   Lab Test 09/13/23  0713 09/08/23  1108 12/08/22  1047   HGB 14.4 14.4 15.1    No results for input(s): \"TROPONINI\" in the last 55731 hours.  No results for input(s): \"BNP\", \"NTBNPI\", \"NTBNP\" in the last 37176 hours.  Recent Labs   Lab Test 11/27/23  1117   TSH 2.62     No results for input(s): \"INR\" in the last 73957 hours.      Data Review    ECGs (tracings independently reviewed)  8/31/2023 - SR with PVCs 106bpm, RBBB QRS 136ms, PVCs of RBRIA (rS I, R III>II, V3 transition later than SR)      24hr Holter monitoring 12/14/2023 (independently reviewed)  Predominant underlying rhythm was sinus rhythm, 49 to 107 bpm, average 69 bpm.  No sustained tachyarrhythmias.  No atrial fibrillation.  There were no pauses of greater than 3 seconds.  Rare supraventricular ectopic beats (<1%).  High burden of monomorphic premature ventricular contractions (14%). One 4 beat run of accelerated idioventricular rhythm  noted.  Symptom triggers - none.    1/10/2024 TTE  1. Normal left ventricular size and " systolic performance with a visually  estimated ejection fraction of 60-65%.  2. No significant valvular heart disease is identified on this study.  3. Normal right ventricular size and systolic performance.    8/31/2023 cardiac MRI with stress  1.  Pharmacological Regadenoson stress cardiac MRI is positive for inducible myocardial ischemia in  inferolateral wall.  2.  Pharmacological Regadenoson stress ECG is nondiagnostic due to baseline ECG abnormality.  3.  Normal left ventricular size, mild LVH and mild hypokinesis in inferolateral wall.             The calculated left  ventricular ejection fraction is 53%.  4.  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 Rest myocardium is viable.  5.  Normal right ventricular size and systolic function.  6.  No obvious valvular disease.    9/13/2023 coronary angiography and PCI  1.  Mild in-stent restenosis in proximal one fourth of  mid LAD stent.  Residual lumen appears good.  Mild disease distal to the stent but no severe stenoses.  2.  Total occlusion of left circumflex after small first OM branch.  Occlusion distance appears at least 3 to 4 cm.  Distal vessel fills from left-sided collaterals.  3.  Large dominant RCA with diffuse disease throughout with focal 90 to 95% stenosis distally just before the PDA takeoff.  4.  Successful PCI entire right coronary from crux to proximal segment with 2 long drug-eluting stents.  SAUL-3 flow flow post PCI with 10% residual narrowing.       Cc: Linda Ochoa MD, López Barahona MD Amila Dilusha William, MD  1/17/2024  1:35 PM        Thank you for allowing me to participate in the care of your patient.      Sincerely,     Cornelius Jacques MD     [facility]  cc:   Linda Ochoa MD  [address]  [place] [address]

## 2024-01-23 ENCOUNTER — HOSPITAL ENCOUNTER (OUTPATIENT)
Dept: CARDIAC REHAB | Facility: CLINIC | Age: 74
Discharge: HOME | End: 2024-01-23
Attending: INTERNAL MEDICINE
Payer: MEDICARE

## 2024-01-23 PROCEDURE — 93798 PHYS/QHP OP CAR RHAB W/ECG: CPT

## 2024-01-26 ENCOUNTER — HOSPITAL ENCOUNTER (OUTPATIENT)
Dept: CARDIAC REHAB | Facility: CLINIC | Age: 74
Discharge: HOME | End: 2024-01-26
Attending: INTERNAL MEDICINE
Payer: MEDICARE

## 2024-01-26 PROCEDURE — 93798 PHYS/QHP OP CAR RHAB W/ECG: CPT

## 2024-01-30 ENCOUNTER — HOSPITAL ENCOUNTER (OUTPATIENT)
Dept: CARDIAC REHAB | Facility: CLINIC | Age: 74
Discharge: HOME | End: 2024-01-30
Attending: INTERNAL MEDICINE
Payer: MEDICARE

## 2024-01-30 PROCEDURE — 93798 PHYS/QHP OP CAR RHAB W/ECG: CPT

## 2024-02-02 ENCOUNTER — HOSPITAL ENCOUNTER (OUTPATIENT)
Dept: CARDIAC REHAB | Facility: CLINIC | Age: 74
Discharge: HOME | End: 2024-02-02
Attending: INTERNAL MEDICINE
Payer: MEDICARE

## 2024-02-02 PROCEDURE — 93798 PHYS/QHP OP CAR RHAB W/ECG: CPT

## 2024-02-06 ENCOUNTER — HOSPITAL ENCOUNTER (OUTPATIENT)
Dept: CARDIAC REHAB | Facility: CLINIC | Age: 74
Discharge: HOME | End: 2024-02-06
Attending: INTERNAL MEDICINE
Payer: MEDICARE

## 2024-02-06 PROCEDURE — 93798 PHYS/QHP OP CAR RHAB W/ECG: CPT

## 2024-02-09 ENCOUNTER — HOSPITAL ENCOUNTER (OUTPATIENT)
Dept: CARDIAC REHAB | Facility: CLINIC | Age: 74
Discharge: HOME | End: 2024-02-09
Attending: INTERNAL MEDICINE
Payer: MEDICARE

## 2024-02-09 PROCEDURE — 93798 PHYS/QHP OP CAR RHAB W/ECG: CPT

## 2024-02-13 ENCOUNTER — HOSPITAL ENCOUNTER (OUTPATIENT)
Dept: CARDIAC REHAB | Facility: CLINIC | Age: 74
Discharge: HOME | End: 2024-02-13
Attending: INTERNAL MEDICINE
Payer: MEDICARE

## 2024-02-13 PROCEDURE — 93798 PHYS/QHP OP CAR RHAB W/ECG: CPT

## 2024-02-16 ENCOUNTER — HOSPITAL ENCOUNTER (OUTPATIENT)
Dept: CARDIAC REHAB | Facility: CLINIC | Age: 74
Discharge: HOME | End: 2024-02-16
Attending: INTERNAL MEDICINE
Payer: MEDICARE

## 2024-02-16 PROCEDURE — 93798 PHYS/QHP OP CAR RHAB W/ECG: CPT

## 2024-02-20 ENCOUNTER — HOSPITAL ENCOUNTER (OUTPATIENT)
Dept: CARDIAC REHAB | Facility: CLINIC | Age: 74
Discharge: HOME | End: 2024-02-20
Attending: INTERNAL MEDICINE
Payer: MEDICARE

## 2024-02-20 PROCEDURE — 93798 PHYS/QHP OP CAR RHAB W/ECG: CPT

## 2024-02-21 ENCOUNTER — LAB (OUTPATIENT)
Dept: INFUSION THERAPY | Facility: HOSPITAL | Age: 74
End: 2024-02-21
Attending: RADIOLOGY
Payer: MEDICARE

## 2024-02-21 DIAGNOSIS — C61 PROSTATE CANCER (H): ICD-10-CM

## 2024-02-21 LAB — PSA SERPL DL<=0.01 NG/ML-MCNC: 3.06 NG/ML (ref 0–6.5)

## 2024-02-21 PROCEDURE — 36415 COLL VENOUS BLD VENIPUNCTURE: CPT

## 2024-02-21 PROCEDURE — 84153 ASSAY OF PSA TOTAL: CPT

## 2024-02-23 ENCOUNTER — HOSPITAL ENCOUNTER (OUTPATIENT)
Dept: CARDIAC REHAB | Facility: CLINIC | Age: 74
Discharge: HOME | End: 2024-02-23
Attending: INTERNAL MEDICINE
Payer: MEDICARE

## 2024-02-23 PROCEDURE — 93798 PHYS/QHP OP CAR RHAB W/ECG: CPT

## 2024-02-27 ENCOUNTER — HOSPITAL ENCOUNTER (OUTPATIENT)
Dept: CARDIAC REHAB | Facility: CLINIC | Age: 74
Discharge: HOME | End: 2024-02-27
Attending: INTERNAL MEDICINE
Payer: MEDICARE

## 2024-02-27 PROCEDURE — 93798 PHYS/QHP OP CAR RHAB W/ECG: CPT

## 2024-02-29 ENCOUNTER — HOSPITAL ENCOUNTER (OUTPATIENT)
Dept: CARDIAC REHAB | Facility: CLINIC | Age: 74
Discharge: HOME | End: 2024-02-29
Attending: INTERNAL MEDICINE
Payer: MEDICARE

## 2024-02-29 PROCEDURE — 93798 PHYS/QHP OP CAR RHAB W/ECG: CPT

## 2024-02-29 PROCEDURE — 93797 PHYS/QHP OP CAR RHAB WO ECG: CPT

## 2024-03-06 ENCOUNTER — OFFICE VISIT (OUTPATIENT)
Dept: RADIATION ONCOLOGY | Facility: CLINIC | Age: 74
End: 2024-03-06
Attending: RADIOLOGY
Payer: MEDICARE

## 2024-03-06 VITALS
BODY MASS INDEX: 28.58 KG/M2 | WEIGHT: 185.2 LBS | HEART RATE: 52 BPM | RESPIRATION RATE: 18 BRPM | OXYGEN SATURATION: 98 % | DIASTOLIC BLOOD PRESSURE: 57 MMHG | SYSTOLIC BLOOD PRESSURE: 124 MMHG

## 2024-03-06 DIAGNOSIS — C61 PROSTATE CANCER (H): Primary | ICD-10-CM

## 2024-03-06 PROCEDURE — 99213 OFFICE O/P EST LOW 20 MIN: CPT | Performed by: RADIOLOGY

## 2024-03-06 PROCEDURE — G0463 HOSPITAL OUTPT CLINIC VISIT: HCPCS | Performed by: RADIOLOGY

## 2024-03-06 RX ORDER — TAMSULOSIN HYDROCHLORIDE 0.4 MG/1
0.4 CAPSULE ORAL DAILY
Qty: 90 CAPSULE | Refills: 2 | Status: SHIPPED | OUTPATIENT
Start: 2024-03-06

## 2024-03-06 ASSESSMENT — PAIN SCALES - GENERAL: PAINLEVEL: NO PAIN (0)

## 2024-03-06 NOTE — PROGRESS NOTES
[facility] Radiation Oncology Follow Up     Patient: Loy Reid  MRN: [id number]  Date of Service: 03/06/2024       DISEASE TREATED:  Unfavorable intermediate risk prostate cancer, clinical stage T2 a N0 M0, Vera grade 4+3 = 7 involving 3/15 cores, and a pretherapy PSA of 7.20.  Staging work-up including bone scan, CT abdomen pelvics and MRI scan showed no evidence of lymph nodes and bone metastasis.         TYPE OF RADIATION THERAPY ADMINISTERED:  7020 cGy in 26 treatments given from 9/20/2023 - 10/27/2023.      INTERVAL SINCE COMPLETION OF RADIATION THERAPY:  6 months.      SUBJECTIVE:  Mr. Reid is a 73 year old male with other medical condition including COPD, coronary artery disease and history of MI with stents placement in 2010.  The patient presented with history of elevation of PSA to 7.20 on 12/8/2022 for which he was a seeking further evaluation.  MRI of pelvics on 3/6/2023 showed 30 g prostate gland with a 21 x 21 mm PI-RADS 5 lesion in the right and the left base and mid gland transitional zone at the 10-1 o'clock position suspicious for malignancy.  There is a long segment capsular abutment indicating moderate suspicious of minimum extraprostatic extension.  There is no evidence of lymph nodes and bone metastasis.  Patient underwent ultrasound-guided needle biopsy on 5/5/2023.  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  The remaining 12 cores of biopsy was negative. Staging work-up including bone scan, CT abdomen pelvics and MRI scan showed no evidence of lymph nodes and bone metastasis.  The patient received definitive radiation therapy for his prostate cancer with a total dose of 7020 cGy in 26 treatments given from 9/20/2023 - 10/27/2023.  He tolerated radiation therapy well with minimal side effect.        The patient has been recovering well since surgery.  His bowel  and bladder functions has been returned to his normal status.  The patient still has a chronic dysuria and has been taking Flomax with moderate relief.  The patient is here for routine post therapy office follow-up.     Medications were reviewed and are up to date on EPIC.    The following portions of the patient's history were reviewed and updated as appropriate: allergies, current medications, past family history, past medical history, past social history, past surgical history and problem list.    Review of Systems:      General  Constitutional  Constitutional (WDL): Exceptions to WDL  Fatigue: Fatigue relieved by rest  EENT  Eye Disorders  Eye Disorder (WDL): All eye disorder elements are within defined limits (wears glasses)  Ear Disorders  Ear Disorder (WDL): All ear disorder elements are within defined limits  Respiratory  Respiratory  Respiratory (WDL): Exceptions to WDL  Cough: Mild symptoms OR nonprescription intervention indicated  Dyspnea: Shortness of breath with moderate exertion  Cardiovascular  Cardiovascular  Cardiovascular (WDL): All cardiovascular elements are within defined limits (Hx of MI, no pacemaker)  Gastrointestinal  Gastrointestinal  Gastrointestinal (WDL): Exceptions to WDL  Diarrhea: Increase of less than 4 stools per day over baseline OR mild increase in ostomy output compared to baseline  Musculoskeletal  Musculoskeletal and Connective Tissue Disorders  Musculoskeletal & Connective (WDL): All musculoskeletal & connective elements are within defined limits  Integumentary  Integumentary  Integumentary (WDL): All integumentary elements are within defined limits  Neurological  Neurosensory  Neurosensory (WDL): All neurosensory elements are within defined limits  Genitourinary/Reproductive  Genitourinary  Genitourinary (WDL): Exceptions to WDL  Urinary Frequency: Present (nocturia x2)  Lymphatic  Lymph System Disorders  Lymph (WDL): All lymph elements are within defined limits  Pain  Pain  Score: No Pain (0)  AUA Assessment  Over the Past Month  How often have you had a sensation of not emptying your bladder completely after you have finished urinating: Less than 1 time in 5: Score:1  How often have you had to urinate again less than 2 hours after you finshed urinating: About half the time: Score: 3  How often have you found you stopped and started again several times when you urinated: About half the time: Score: 3  How often have you found it difficult to postpone urine: Less than half the time: Score: 2  How often have you had a weak urinary stream: About half the time: Score: 3  How often have you had to push or starin to begin uriation: Less than half the time: Score: 2  How many times do you most typically get up to urinate from the time you went to bed at night until the time you got up in the morning?: 2 times, Score: 2  Total AUA Score: Degree of Severity: 8-19 is Moderate (16)  If you had to spend the rest of your life with your urinary condition just the way it is now, how would you feel: Mostly Satisfied  Please check the appropriate box that describes your ability to have an erection: Able to have an erection but not sufficient to haev intercourse                                                           Accompanied by  Accompanied By: spouse    Objective:      PHYSICAL EXAMINATION:    /57   Pulse 52   Resp 18   Wt 84 kg (185 lb 3.2 oz)   SpO2 98%   BMI 28.58 kg/m      Gen: Alert, in NAD  Eyes: PERRL, EOMI, sclera anicteric  Pulm: No wheezing, stridor or respiratory distress  CV: Well-perfused, no cyanosis, no pedal edema  Back: No step-offs or pain to palpation along the thoracolumbar spine  Rectal: Deferred  : Deferred  Musculoskeletal: Normal muscle bulk and tone  Skin: Normal color and turgor  Neurologic: A/Ox3, CN II-XII intact, normal gait and station  Psychiatric: Appropriate mood and affect     Prostate Specific Antigen Screen   Date Value Ref Range Status    12/08/2022 7.20 (H) 0.00 - 6.50 ng/mL Final   11/26/2019 6.9 (H) 0.0 - 4.5 ng/mL Final     PSA Tumor Marker   Date Value Ref Range Status   02/21/2024 3.06 0.00 - 6.50 ng/mL Final   11/27/2023 6.18 0.00 - 6.50 ng/mL Final   06/08/2023 6.76 (H) 0.00 - 6.50 ng/mL Final      Impression     Mr. Reid is a 73 year old male with a diagnosis of unfavorable intermediate risk prostate cancer, clinical stage T2 a N0 M0, Aaron grade 4+3 = 7 involving 3/15 cores, and a pretherapy PSA of 7.20.  Staging work-up including bone scan, CT abdomen pelvics and MRI scan showed no evidence of lymph nodes and bone metastasis.  The patient received definitive radiation therapy for his prostate cancer with a total dose of 7020 cGy in 26 treatments given from 9/20/2023 - 10/27/2023. AUA: 12/35    Assessment & Plan:     1.  The patient has been stable since radiation therapy with mild to moderate improvement of his urinary function.  His PSA showed additional decline at this time.  We will check his PSA again in 4 months and office follow-up     2.  Continue follow-up with Dr. López Barahona, PCP and Dr. Lugo, urology as planned     Face to face time  20 minutes with > 80% spent on consultation, education and coordination of care.      Lacey Young MD  Department of Radiation Oncology   [facility]  Tel: [phone]  Page: [phone]    [facility]  [address] [place] [address]     [address] [place] [address]    CC:  Patient Care Team:  López Barahona MD as PCP - General  López Barahona MD as Assigned PCP  Burak Lugo MD as MD (Urology)  Burak Lugo MD as Assigned Surgical Provider  Lacey Young MD as MD (Radiation Oncology)  Linda Ochoa MD as MD (Cardiovascular Disease)  Lacey Young MD as Assigned Cancer Care Provider  Linda Ochoa MD as Assigned Heart and Vascular Provider

## 2024-03-06 NOTE — LETTER
3/6/2024         RE: Loy Reid  [address]  [place] [address]        Dear Colleague,    Thank you for referring your patient, Loy Reid, to the [facility] RADIATION ONCOLOGY [place]. Please see a copy of my visit note below.    [facility] Radiation Oncology Follow Up     Patient: Loy Reid  MRN: [id number]  Date of Service: 03/06/2024       DISEASE TREATED:  Unfavorable intermediate risk prostate cancer, clinical stage T2 a N0 M0, Aaron grade 4+3 = 7 involving 3/15 cores, and a pretherapy PSA of 7.20.  Staging work-up including bone scan, CT abdomen pelvics and MRI scan showed no evidence of lymph nodes and bone metastasis.         TYPE OF RADIATION THERAPY ADMINISTERED:  7020 cGy in 26 treatments given from 9/20/2023 - 10/27/2023.      INTERVAL SINCE COMPLETION OF RADIATION THERAPY:  6 months.      SUBJECTIVE:  Mr. Reid is a 73 year old male with other medical condition including COPD, coronary artery disease and history of MI with stents placement in 2010.  The patient presented with history of elevation of PSA to 7.20 on 12/8/2022 for which he was a seeking further evaluation.  MRI of pelvics on 3/6/2023 showed 30 g prostate gland with a 21 x 21 mm PI-RADS 5 lesion in the right and the left base and mid gland transitional zone at the 10-1 o'clock position suspicious for malignancy.  There is a long segment capsular abutment indicating moderate suspicious of minimum extraprostatic extension.  There is no evidence of lymph nodes and bone metastasis.  Patient underwent ultrasound-guided needle biopsy on 5/5/2023.  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  The remaining 12 cores of biopsy was negative. Staging work-up including bone scan, CT abdomen pelvics and MRI scan showed no evidence of lymph nodes and bone metastasis.  The patient received  definitive radiation therapy for his prostate cancer with a total dose of 7020 cGy in 26 treatments given from 9/20/2023 - 10/27/2023.  He tolerated radiation therapy well with minimal side effect.        The patient has been recovering well since surgery.  His bowel and bladder functions has been returned to his normal status.  The patient still has a chronic dysuria and has been taking Flomax with moderate relief.  The patient is here for routine post therapy office follow-up.     Medications were reviewed and are up to date on EPIC.    The following portions of the patient's history were reviewed and updated as appropriate: allergies, current medications, past family history, past medical history, past social history, past surgical history and problem list.    Review of Systems:      General  Constitutional  Constitutional (WDL): Exceptions to WDL  Fatigue: Fatigue relieved by rest  EENT  Eye Disorders  Eye Disorder (WDL): All eye disorder elements are within defined limits (wears glasses)  Ear Disorders  Ear Disorder (WDL): All ear disorder elements are within defined limits  Respiratory  Respiratory  Respiratory (WDL): Exceptions to WDL  Cough: Mild symptoms OR nonprescription intervention indicated  Dyspnea: Shortness of breath with moderate exertion  Cardiovascular  Cardiovascular  Cardiovascular (WDL): All cardiovascular elements are within defined limits (Hx of MI, no pacemaker)  Gastrointestinal  Gastrointestinal  Gastrointestinal (WDL): Exceptions to WDL  Diarrhea: Increase of less than 4 stools per day over baseline OR mild increase in ostomy output compared to baseline  Musculoskeletal  Musculoskeletal and Connective Tissue Disorders  Musculoskeletal & Connective (WDL): All musculoskeletal & connective elements are within defined limits  Integumentary  Integumentary  Integumentary (WDL): All integumentary elements are within defined limits  Neurological  Neurosensory  Neurosensory (WDL): All  neurosensory elements are within defined limits  Genitourinary/Reproductive  Genitourinary  Genitourinary (WDL): Exceptions to WDL  Urinary Frequency: Present (nocturia x2)  Lymphatic  Lymph System Disorders  Lymph (WDL): All lymph elements are within defined limits  Pain  Pain Score: No Pain (0)  AUA Assessment  Over the Past Month  How often have you had a sensation of not emptying your bladder completely after you have finished urinating: Less than 1 time in 5: Score:1  How often have you had to urinate again less than 2 hours after you finshed urinating: About half the time: Score: 3  How often have you found you stopped and started again several times when you urinated: About half the time: Score: 3  How often have you found it difficult to postpone urine: Less than half the time: Score: 2  How often have you had a weak urinary stream: About half the time: Score: 3  How often have you had to push or starin to begin uriation: Less than half the time: Score: 2  How many times do you most typically get up to urinate from the time you went to bed at night until the time you got up in the morning?: 2 times, Score: 2  Total AUA Score: Degree of Severity: 8-19 is Moderate (16)  If you had to spend the rest of your life with your urinary condition just the way it is now, how would you feel: Mostly Satisfied  Please check the appropriate box that describes your ability to have an erection: Able to have an erection but not sufficient to haev intercourse                                                           Accompanied by  Accompanied By: spouse    Objective:      PHYSICAL EXAMINATION:    /57   Pulse 52   Resp 18   Wt 84 kg (185 lb 3.2 oz)   SpO2 98%   BMI 28.58 kg/m      Gen: Alert, in NAD  Eyes: PERRL, EOMI, sclera anicteric  Pulm: No wheezing, stridor or respiratory distress  CV: Well-perfused, no cyanosis, no pedal edema  Back: No step-offs or pain to palpation along the thoracolumbar spine  Rectal:  Deferred  : Deferred  Musculoskeletal: Normal muscle bulk and tone  Skin: Normal color and turgor  Neurologic: A/Ox3, CN II-XII intact, normal gait and station  Psychiatric: Appropriate mood and affect     Prostate Specific Antigen Screen   Date Value Ref Range Status   12/08/2022 7.20 (H) 0.00 - 6.50 ng/mL Final   11/26/2019 6.9 (H) 0.0 - 4.5 ng/mL Final     PSA Tumor Marker   Date Value Ref Range Status   02/21/2024 3.06 0.00 - 6.50 ng/mL Final   11/27/2023 6.18 0.00 - 6.50 ng/mL Final   06/08/2023 6.76 (H) 0.00 - 6.50 ng/mL Final      Impression     Mr. Reid is a 73 year old male with a diagnosis of unfavorable intermediate risk prostate cancer, clinical stage T2 a N0 M0, Aaron grade 4+3 = 7 involving 3/15 cores, and a pretherapy PSA of 7.20.  Staging work-up including bone scan, CT abdomen pelvics and MRI scan showed no evidence of lymph nodes and bone metastasis.  The patient received definitive radiation therapy for his prostate cancer with a total dose of 7020 cGy in 26 treatments given from 9/20/2023 - 10/27/2023. AUA: 12/35    Assessment & Plan:     1.  The patient has been stable since radiation therapy with mild to moderate improvement of his urinary function.  His PSA showed additional decline at this time.  We will check his PSA again in 4 months and office follow-up     2.  Continue follow-up with Dr. López Barahona, PCP and Dr. Lugo, urology as planned     Face to face time  20 minutes with > 80% spent on consultation, education and coordination of care.      Lacey Young MD  Department of Radiation Oncology   [facility]  Tel: [phone]  Page: [phone]    [facility]  [address]  [place] [address]     [address] [place] [address]    CC:  Patient Care Team:  López Barahona MD as PCP - General  López Barahona MD as Assigned PCP  Burak Lugo MD as MD (Urology)  Burak Lugo MD as Assigned Surgical Provider  Hector  "MD Lacey as MD (Radiation Oncology)  Linda Ochoa MD as MD (Cardiovascular Disease)  Lacey Young MD as Assigned Cancer Care Provider  Linda Ochoa MD as Assigned Heart and Vascular Provider      Oncology Rooming Note    March 6, 2024 11:34 AM   Loy Reid is a 73 year old male who presents for:    Chief Complaint   Patient presents with     Oncology Clinic Visit     Prostate Cancer     Rad Onc follow up     Initial Vitals: /57   Pulse 52   Resp 18   Wt 84 kg (185 lb 3.2 oz)   SpO2 98%   BMI 28.58 kg/m   Estimated body mass index is 28.58 kg/m  as calculated from the following:    Height as of 9/26/23: 1.715 m (5' 7.5\").    Weight as of this encounter: 84 kg (185 lb 3.2 oz). Body surface area is 2 meters squared.  No Pain (0) Comment: Data Unavailable   No LMP for male patient.  Allergies reviewed: Yes  Medications reviewed: Yes    Medications: Medication refills not needed today.  Pharmacy name entered into [employer]: [facility][id number] [facility] [address] AT [facility] [address]    Frailty Screening:   Is the patient here for a new oncology consult visit in cancer care? 2. No      Clinical concerns: Questioning if he should continue flomax or not. Recent loose stools.   Dr. Young was notified.      Nae Escalera RN                Again, thank you for allowing me to participate in the care of your patient.        Sincerely,        Lacey Young MD  "

## 2024-03-06 NOTE — PROGRESS NOTES
"Oncology Rooming Note    March 6, 2024 11:34 AM   Loy Reid is a 73 year old male who presents for:    Chief Complaint   Patient presents with    Oncology Clinic Visit    Prostate Cancer     Rad Onc follow up     Initial Vitals: /57   Pulse 52   Resp 18   Wt 84 kg (185 lb 3.2 oz)   SpO2 98%   BMI 28.58 kg/m   Estimated body mass index is 28.58 kg/m  as calculated from the following:    Height as of 9/26/23: 1.715 m (5' 7.5\").    Weight as of this encounter: 84 kg (185 lb 3.2 oz). Body surface area is 2 meters squared.  No Pain (0) Comment: Data Unavailable   No LMP for male patient.  Allergies reviewed: Yes  Medications reviewed: Yes    Medications: Medication refills not needed today.  Pharmacy name entered into [employer]: [facility] #[id number] [facility] [address] [place] [facility] [place] [address]    Frailty Screening:   Is the patient here for a new oncology consult visit in cancer care? 2. No      Clinical concerns: Questioning if he should continue flomax or not. Recent loose stools.   Dr. Young was notified.      Nae Escalera RN              "

## 2024-07-08 ENCOUNTER — LAB (OUTPATIENT)
Dept: INFUSION THERAPY | Facility: HOSPITAL | Age: 74
End: 2024-07-08
Attending: RADIOLOGY
Payer: MEDICARE

## 2024-07-08 DIAGNOSIS — E78.5 DYSLIPIDEMIA: ICD-10-CM

## 2024-07-08 DIAGNOSIS — C61 PROSTATE CANCER (H): ICD-10-CM

## 2024-07-08 PROCEDURE — 36415 COLL VENOUS BLD VENIPUNCTURE: CPT

## 2024-07-08 PROCEDURE — 84153 ASSAY OF PSA TOTAL: CPT

## 2024-07-08 RX ORDER — ROSUVASTATIN CALCIUM 40 MG/1
40 TABLET, COATED ORAL DAILY
Qty: 90 TABLET | Refills: 3 | Status: SHIPPED | OUTPATIENT
Start: 2024-07-08

## 2024-07-09 LAB — PSA SERPL DL<=0.01 NG/ML-MCNC: 1.48 NG/ML (ref 0–6.5)

## 2024-07-10 ENCOUNTER — OFFICE VISIT (OUTPATIENT)
Dept: RADIATION ONCOLOGY | Facility: CLINIC | Age: 74
End: 2024-07-10
Attending: RADIOLOGY
Payer: MEDICARE

## 2024-07-10 VITALS
BODY MASS INDEX: 28.15 KG/M2 | RESPIRATION RATE: 16 BRPM | TEMPERATURE: 97.7 F | WEIGHT: 182.4 LBS | OXYGEN SATURATION: 96 % | SYSTOLIC BLOOD PRESSURE: 106 MMHG | DIASTOLIC BLOOD PRESSURE: 50 MMHG | HEART RATE: 53 BPM

## 2024-07-10 DIAGNOSIS — C61 PROSTATE CANCER (H): Primary | ICD-10-CM

## 2024-07-10 PROCEDURE — G0463 HOSPITAL OUTPT CLINIC VISIT: HCPCS | Performed by: RADIOLOGY

## 2024-07-10 PROCEDURE — G2211 COMPLEX E/M VISIT ADD ON: HCPCS | Performed by: RADIOLOGY

## 2024-07-10 PROCEDURE — 99212 OFFICE O/P EST SF 10 MIN: CPT | Performed by: RADIOLOGY

## 2024-07-10 ASSESSMENT — PAIN SCALES - GENERAL: PAINLEVEL: NO PAIN (0)

## 2024-07-10 NOTE — LETTER
7/10/2024      Loy Reid  [address] [address]      Dear Colleague,    Thank you for referring your patient, Loy Reid, to the [facility] RADIATION ONCOLOGY [place]. Please see a copy of my visit note below.    [facility] Radiation Oncology Follow Up     Patient: Loy Reid  MRN: [id number]  Date of Service: 07/10/2024       DISEASE TREATED:  Unfavorable intermediate risk prostate cancer, clinical stage T2 a N0 M0, Aaron grade 4+3 = 7 involving 3/15 cores, and a pretherapy PSA of 7.20.  Staging work-up including bone scan, CT abdomen pelvics and MRI scan showed no evidence of lymph nodes and bone metastasis.         TYPE OF RADIATION THERAPY ADMINISTERED:  7020 cGy in 26 treatments given from 9/20/2023 - 10/27/2023.      INTERVAL SINCE COMPLETION OF RADIATION THERAPY:  9 months.      SUBJECTIVE:  Mr. Reid is a 73 year old male with other medical condition including COPD, coronary artery disease and history of MI with stents placement in 2010.  The patient presented with history of elevation of PSA to 7.20 on 12/8/2022 for which he was a seeking further evaluation.  MRI of pelvics on 3/6/2023 showed 30 g prostate gland with a 21 x 21 mm PI-RADS 5 lesion in the right and the left base and mid gland transitional zone at the 10-1 o'clock position suspicious for malignancy.  There is a long segment capsular abutment indicating moderate suspicious of minimum extraprostatic extension.  There is no evidence of lymph nodes and bone metastasis.  Patient underwent ultrasound-guided needle biopsy on 5/5/2023.  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  The remaining 12 cores of biopsy was negative. Staging work-up including bone scan, CT abdomen pelvics and MRI scan showed no evidence of lymph nodes and bone metastasis.  The patient received definitive  radiation therapy for his prostate cancer with a total dose of 7020 cGy in 26 treatments given from 9/20/2023 - 10/27/2023.  He tolerated radiation therapy well with minimal side effect.        The patient has been recovering well since surgery.  His bowel and bladder functions has been returned to his normal status. The patient still has a chronic dysuria and has been taking Flomax with moderate relief.  The patient is here for routine post therapy office follow-up.     Medications were reviewed and are up to date on EPIC.    The following portions of the patient's history were reviewed and updated as appropriate: allergies, current medications, past family history, past medical history, past social history, past surgical history and problem list.    Review of Systems:      General  Constitutional  Constitutional (WDL): Exceptions to WDL  Fatigue: Fatigue relieved by rest  EENT  Eye Disorders  Eye Disorder (WDL): All eye disorder elements are within defined limits (wears glasses)  Ear Disorders  Ear Disorder (WDL): All ear disorder elements are within defined limits  Respiratory  Respiratory  Respiratory (WDL): Exceptions to WDL  Cough: Mild symptoms OR nonprescription intervention indicated  Dyspnea: Shortness of breath with moderate exertion  Cardiovascular  Cardiovascular  Cardiovascular (WDL): All cardiovascular elements are within defined limits (Hx of MI, no pacemaker)  Gastrointestinal  Gastrointestinal  Gastrointestinal (WDL): Exceptions to WDL  Diarrhea: Increase of less than 4 stools per day over baseline OR mild increase in ostomy output compared to baseline  Musculoskeletal  Musculoskeletal and Connective Tissue Disorders  Musculoskeletal & Connective (WDL): All musculoskeletal & connective elements are within defined limits  Integumentary  Integumentary  Integumentary (WDL): All integumentary elements are within defined limits  Neurological  Neurosensory  Neurosensory (WDL): All neurosensory elements  are within defined limits  Genitourinary/Reproductive  Genitourinary  Genitourinary (WDL): Exceptions to WDL  Urinary Frequency: Present (nocturia x1)  Lymphatic  Lymph System Disorders  Lymph (WDL): All lymph elements are within defined limits  Pain  Pain Score: No Pain (0)  AUA Assessment                                                              Accompanied by  Accompanied By: self only    Objective:      PHYSICAL EXAMINATION:    /50 (BP Location: Right arm, Patient Position: Sitting, Cuff Size: Adult Regular)   Pulse 53   Temp 97.7  F (36.5  C) (Oral)   Resp 16   Wt 82.7 kg (182 lb 6.4 oz)   SpO2 96%   BMI 28.15 kg/m      Gen: Alert, in NAD  Eyes: PERRL, EOMI, sclera anicteric  Pulm: No wheezing, stridor or respiratory distress  CV: Well-perfused, no cyanosis, no pedal edema  Back: No step-offs or pain to palpation along the thoracolumbar spine  Rectal: Deferred  : Deferred  Musculoskeletal: Normal muscle bulk and tone  Skin: Normal color and turgor  Neurologic: A/Ox3, CN II-XII intact, normal gait and station  Psychiatric: Appropriate mood and affect     Prostate Specific Antigen Screen   Date Value Ref Range Status   12/08/2022 7.20 (H) 0.00 - 6.50 ng/mL Final   11/26/2019 6.9 (H) 0.0 - 4.5 ng/mL Final     PSA Tumor Marker   Date Value Ref Range Status   07/08/2024 1.48 0.00 - 6.50 ng/mL Final   02/21/2024 3.06 0.00 - 6.50 ng/mL Final   11/27/2023 6.18 0.00 - 6.50 ng/mL Final   06/08/2023 6.76 (H) 0.00 - 6.50 ng/mL Final      Impression     Mr. Reid is a 73 year old male with a diagnosis of unfavorable intermediate risk prostate cancer, clinical stage T2 a N0 M0, [place] grade 4+3 = 7 involving 3/15 cores, and a pretherapy PSA of 7.20.  Staging work-up including bone scan, CT abdomen pelvics and MRI scan showed no evidence of lymph nodes and bone metastasis.  The patient received definitive radiation therapy for his prostate cancer with a total dose of 7020 cGy in 26 treatments given from  "9/20/2023 - 10/27/2023. AUA: 12/35     Assessment & Plan:     1.  The patient has been stable since radiation therapy with mild to moderate improvement of his urinary function.  His PSA showed additional decline at this time.  We will check his PSA again in 4 months and office follow-up     2.  Continue follow-up with Dr. López Barahona, PCP and Dr. Lugo, urology as planned.     Pain Management Plan: NA    Face to face time  20 minutes with > 80% spent on consultation, education and coordination of care.        Lacey Young MD  Department of Radiation Oncology   [facility] Radiation Oncology  Tel: [phone]  Page: [phone]    [facility]  [address] [place] [address]     [place] [id number] [facility] [place] [address]    CC:  Patient Care Team:  López Barahona MD as PCP - General  López Barahona MD as Assigned PCP  Burak Lugo MD as MD (Urology)  Burak Lugo MD as Assigned Surgical Provider  Lacey Young MD as MD (Radiation Oncology)  Linda Ochoa MD as MD (Cardiovascular Disease)  Lacey Young MD as Assigned Cancer Care Provider  Linda Ochoa MD as Assigned Heart and Vascular Provider      Oncology Rooming Note    July 10, 2024 11:12 AM   Loy Reid is a 73 year old male who presents for:    Chief Complaint   Patient presents with     Oncology Clinic Visit     Follow up with Dr. Young     Prostate Cancer     Initial Vitals: /50 (BP Location: Right arm, Patient Position: Sitting, Cuff Size: Adult Regular)   Pulse 53   Temp 97.7  F (36.5  C) (Oral)   Resp 16   Wt 82.7 kg (182 lb 6.4 oz)   SpO2 96%   BMI 28.15 kg/m   Estimated body mass index is 28.15 kg/m  as calculated from the following:    Height as of 9/26/23: 1.715 m (5' 7.5\").    Weight as of this encounter: 82.7 kg (182 lb 6.4 oz). Body surface area is 1.98 meters squared.  No Pain (0) Comment: Data Unavailable   No LMP for male patient.  Allergies reviewed: " Yes  Medications reviewed: Yes    Medications: Medication refills not needed today.  Pharmacy name entered into [employer]: [facility] #[id number] [facility] [address] AT [facility] [address]    Frailty Screening:   Is the patient here for a new oncology consult visit in cancer care? 2. No      Clinical concerns: Patient here ambulatory for follow-up status post radiation for his prostate cancer.  PSA and a UA completed.  Occasional diarrhea.  Seen by Dr. Young.  Plan return to clinic for follow-up as directed by provider.   Dr. Young was notified.      Mary Renteria RN                Again, thank you for allowing me to participate in the care of your patient.        Sincerely,        Lacey Young MD

## 2024-07-10 NOTE — PROGRESS NOTES
"Oncology Rooming Note    July 10, 2024 11:12 AM   Loy Reid is a 73 year old male who presents for:    Chief Complaint   Patient presents with    Oncology Clinic Visit     Follow up with Dr. Young    Prostate Cancer     Initial Vitals: /50 (BP Location: Right arm, Patient Position: Sitting, Cuff Size: Adult Regular)   Pulse 53   Temp 97.7  F (36.5  C) (Oral)   Resp 16   Wt 82.7 kg (182 lb 6.4 oz)   SpO2 96%   BMI 28.15 kg/m   Estimated body mass index is 28.15 kg/m  as calculated from the following:    Height as of 9/26/23: 1.715 m (5' 7.5\").    Weight as of this encounter: 82.7 kg (182 lb 6.4 oz). Body surface area is 1.98 meters squared.  No Pain (0) Comment: Data Unavailable   No LMP for male patient.  Allergies reviewed: Yes  Medications reviewed: Yes    Medications: Medication refills not needed today.  Pharmacy name entered into [employer]: [facility] #[id number] [facility] [address] AT [facility] [address]    Frailty Screening:   Is the patient here for a new oncology consult visit in cancer care? 2. No      Clinical concerns: Patient here ambulatory for follow-up status post radiation for his prostate cancer.  PSA and a UA completed.  Occasional diarrhea.  Seen by Dr. Young.  Plan return to clinic for follow-up as directed by provider.   Dr. Young was notified.      Mary Renterai RN              "

## 2024-07-10 NOTE — PROGRESS NOTES
[facility] Radiation Oncology Follow Up     Patient: Loy Reid  MRN: [id number]  Date of Service: 07/10/2024       DISEASE TREATED:  Unfavorable intermediate risk prostate cancer, clinical stage T2 a N0 M0, [place] grade 4+3 = 7 involving 3/15 cores, and a pretherapy PSA of 7.20.  Staging work-up including bone scan, CT abdomen pelvics and MRI scan showed no evidence of lymph nodes and bone metastasis.         TYPE OF RADIATION THERAPY ADMINISTERED:  7020 cGy in 26 treatments given from 9/20/2023 - 10/27/2023.      INTERVAL SINCE COMPLETION OF RADIATION THERAPY:  9 months.      SUBJECTIVE:  Mr. Reid is a 73 year old male with other medical condition including COPD, coronary artery disease and history of MI with stents placement in 2010.  The patient presented with history of elevation of PSA to 7.20 on 12/8/2022 for which he was a seeking further evaluation.  MRI of pelvics on 3/6/2023 showed 30 g prostate gland with a 21 x 21 mm PI-RADS 5 lesion in the right and the left base and mid gland transitional zone at the 10-1 o'clock position suspicious for malignancy.  There is a long segment capsular abutment indicating moderate suspicious of minimum extraprostatic extension.  There is no evidence of lymph nodes and bone metastasis.  Patient underwent ultrasound-guided needle biopsy on 5/5/2023.  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  The remaining 12 cores of biopsy was negative. Staging work-up including bone scan, CT abdomen pelvics and MRI scan showed no evidence of lymph nodes and bone metastasis.  The patient received definitive radiation therapy for his prostate cancer with a total dose of 7020 cGy in 26 treatments given from 9/20/2023 - 10/27/2023.  He tolerated radiation therapy well with minimal side effect.        The patient has been recovering well since surgery.  His bowel  and bladder functions has been returned to his normal status. The patient still has a chronic dysuria and has been taking Flomax with moderate relief.  The patient is here for routine post therapy office follow-up.     Medications were reviewed and are up to date on EPIC.    The following portions of the patient's history were reviewed and updated as appropriate: allergies, current medications, past family history, past medical history, past social history, past surgical history and problem list.    Review of Systems:      General  Constitutional  Constitutional (WDL): Exceptions to WDL  Fatigue: Fatigue relieved by rest  EENT  Eye Disorders  Eye Disorder (WDL): All eye disorder elements are within defined limits (wears glasses)  Ear Disorders  Ear Disorder (WDL): All ear disorder elements are within defined limits  Respiratory  Respiratory  Respiratory (WDL): Exceptions to WDL  Cough: Mild symptoms OR nonprescription intervention indicated  Dyspnea: Shortness of breath with moderate exertion  Cardiovascular  Cardiovascular  Cardiovascular (WDL): All cardiovascular elements are within defined limits (Hx of MI, no pacemaker)  Gastrointestinal  Gastrointestinal  Gastrointestinal (WDL): Exceptions to WDL  Diarrhea: Increase of less than 4 stools per day over baseline OR mild increase in ostomy output compared to baseline  Musculoskeletal  Musculoskeletal and Connective Tissue Disorders  Musculoskeletal & Connective (WDL): All musculoskeletal & connective elements are within defined limits  Integumentary  Integumentary  Integumentary (WDL): All integumentary elements are within defined limits  Neurological  Neurosensory  Neurosensory (WDL): All neurosensory elements are within defined limits  Genitourinary/Reproductive  Genitourinary  Genitourinary (WDL): Exceptions to WDL  Urinary Frequency: Present (nocturia x1)  Lymphatic  Lymph System Disorders  Lymph (WDL): All lymph elements are within defined limits  Pain  Pain  Score: No Pain (0)  AUA Assessment                                                              Accompanied by  Accompanied By: self only    Objective:      PHYSICAL EXAMINATION:    /50 (BP Location: Right arm, Patient Position: Sitting, Cuff Size: Adult Regular)   Pulse 53   Temp 97.7  F (36.5  C) (Oral)   Resp 16   Wt 82.7 kg (182 lb 6.4 oz)   SpO2 96%   BMI 28.15 kg/m      Gen: Alert, in NAD  Eyes: PERRL, EOMI, sclera anicteric  Pulm: No wheezing, stridor or respiratory distress  CV: Well-perfused, no cyanosis, no pedal edema  Back: No step-offs or pain to palpation along the thoracolumbar spine  Rectal: Deferred  : Deferred  Musculoskeletal: Normal muscle bulk and tone  Skin: Normal color and turgor  Neurologic: A/Ox3, CN II-XII intact, normal gait and station  Psychiatric: Appropriate mood and affect     Prostate Specific Antigen Screen   Date Value Ref Range Status   12/08/2022 7.20 (H) 0.00 - 6.50 ng/mL Final   11/26/2019 6.9 (H) 0.0 - 4.5 ng/mL Final     PSA Tumor Marker   Date Value Ref Range Status   07/08/2024 1.48 0.00 - 6.50 ng/mL Final   02/21/2024 3.06 0.00 - 6.50 ng/mL Final   11/27/2023 6.18 0.00 - 6.50 ng/mL Final   06/08/2023 6.76 (H) 0.00 - 6.50 ng/mL Final      Impression     Mr. Reid is a 73 year old male with a diagnosis of unfavorable intermediate risk prostate cancer, clinical stage T2 a N0 M0, Onset grade 4+3 = 7 involving 3/15 cores, and a pretherapy PSA of 7.20.  Staging work-up including bone scan, CT abdomen pelvics and MRI scan showed no evidence of lymph nodes and bone metastasis.  The patient received definitive radiation therapy for his prostate cancer with a total dose of 7020 cGy in 26 treatments given from 9/20/2023 - 10/27/2023. AUA: 12/35     Assessment & Plan:     1.  The patient has been stable since radiation therapy with mild to moderate improvement of his urinary function.  His PSA showed additional decline at this time.  We will check his PSA again in 4  months and office follow-up     2.  Continue follow-up with Dr. López Barahona, PCP and Dr. Lugo, urology as planned.     Pain Management Plan: NA    Face to face time  20 minutes with > 80% spent on consultation, education and coordination of care.        Lacey Young MD  Department of Radiation Oncology   [facility] Radiation Oncology  Tel: [phone]  Page: [phone]    [facility]  [address] [place] [address]     [address] [place] [address]    CC:  Patient Care Team:  López Barahona MD as PCP - General  López Barahona MD as Assigned PCP  Burak Lugo MD as MD (Urology)  Burak Lugo MD as Assigned Surgical Provider  Lacey Young MD as MD (Radiation Oncology)  Linda Ochoa MD as MD (Cardiovascular Disease)  Lacey Young MD as Assigned Cancer Care Provider  Linda Ochoa MD as Assigned Heart and Vascular Provider

## 2024-08-20 DIAGNOSIS — I10 HYPERTENSION, UNSPECIFIED TYPE: ICD-10-CM

## 2024-08-20 RX ORDER — LISINOPRIL 5 MG/1
5 TABLET ORAL DAILY
Qty: 90 TABLET | Refills: 0 | Status: SHIPPED | OUTPATIENT
Start: 2024-08-20

## 2024-09-04 DIAGNOSIS — I10 ESSENTIAL HYPERTENSION: ICD-10-CM

## 2024-09-04 RX ORDER — METOPROLOL SUCCINATE 50 MG/1
50 TABLET, EXTENDED RELEASE ORAL DAILY
Qty: 90 TABLET | Refills: 0 | Status: SHIPPED | OUTPATIENT
Start: 2024-09-04

## 2024-12-07 ENCOUNTER — HEALTH MAINTENANCE LETTER (OUTPATIENT)
Age: 74
End: 2024-12-07

## 2024-12-12 ENCOUNTER — OFFICE VISIT (OUTPATIENT)
Dept: CARDIOLOGY | Facility: CLINIC | Age: 74
End: 2024-12-12
Payer: MEDICARE

## 2024-12-12 VITALS
WEIGHT: 186.3 LBS | DIASTOLIC BLOOD PRESSURE: 54 MMHG | RESPIRATION RATE: 16 BRPM | HEART RATE: 76 BPM | SYSTOLIC BLOOD PRESSURE: 136 MMHG | BODY MASS INDEX: 28.75 KG/M2

## 2024-12-12 DIAGNOSIS — I25.10 CORONARY ARTERY DISEASE INVOLVING NATIVE CORONARY ARTERY OF NATIVE HEART WITHOUT ANGINA PECTORIS: ICD-10-CM

## 2024-12-12 DIAGNOSIS — E78.2 MIXED HYPERLIPIDEMIA: ICD-10-CM

## 2024-12-12 DIAGNOSIS — E78.5 DYSLIPIDEMIA: Primary | ICD-10-CM

## 2024-12-12 DIAGNOSIS — C61 PROSTATE CANCER (H): ICD-10-CM

## 2024-12-12 DIAGNOSIS — I25.10 CORONARY ARTERY DISEASE INVOLVING NATIVE HEART WITHOUT ANGINA PECTORIS, UNSPECIFIED VESSEL OR LESION TYPE: ICD-10-CM

## 2024-12-12 LAB
CHOLEST SERPL-MCNC: 99 MG/DL
FASTING STATUS PATIENT QL REPORTED: YES
HDLC SERPL-MCNC: 34 MG/DL
LDLC SERPL CALC-MCNC: 47 MG/DL
NONHDLC SERPL-MCNC: 65 MG/DL
TRIGL SERPL-MCNC: 88 MG/DL

## 2024-12-12 RX ORDER — EZETIMIBE 10 MG/1
10 TABLET ORAL DAILY
Qty: 90 TABLET | Refills: 3 | Status: SHIPPED | OUTPATIENT
Start: 2024-12-12

## 2024-12-12 RX ORDER — NITROGLYCERIN 0.3 MG/1
0.3 TABLET SUBLINGUAL EVERY 5 MIN PRN
Qty: 20 TABLET | Refills: 3 | Status: SHIPPED | OUTPATIENT
Start: 2024-12-12

## 2024-12-12 RX ORDER — TAMSULOSIN HYDROCHLORIDE 0.4 MG/1
0.4 CAPSULE ORAL DAILY
Qty: 90 CAPSULE | Refills: 2 | Status: SHIPPED | OUTPATIENT
Start: 2024-12-12

## 2024-12-12 NOTE — LETTER
12/12/2024    CUATE KING MD  [address] [place] [address]    RE: Loy Reid       Dear Colleague,     I had the pleasure of seeing Loy Reid in the [facility].         [facility] [address], [place] [address]   [email or web address]   OFFICE: [phone]            Impression and Plan     1.  Coronary artery disease.  Loy has known coronary artery disease having had history of unstable ischemic syndrome with PCI with stent placement to the mid LAD in 2010.     Loy underwent repeat angiography 13 September 2023 and had successful PCI entire right coronary from crux to proximal segment with 2 long drug-eluting stents.  (3.5 x 48 Synergy drug-eluting stents x 2).  The circumflex was occluded and filled via collaterals from the left circulation.  Continue aspirin.  At this juncture, Loy can discontinue the clopidogrel.     2.  PVCs.  As noted below, Loy had been noted in cardiac rehab to have evidence of PVCs though asymptomatic.  He had a Holter monitor placed and just turned in the device 48 hours ago with results pending.  Will follow-up results accordingly.  Continue metoprolol as per problem #3.     3.  Hypertension.  Blood pressure is very reasonable in the office today at 102/42 mmHg.  Continue metoprolol succinate 50 mg daily.  Continue lisinopril..     4.  Dyslipidemia.  Lipid profile 27 November 2023 revealed LDL 97 mg/dL and HDL 32 mg/dL.  Continue rosuvastatin 40 mg daily.  Continue ezetimibe.  Plan to obtain repeat fasting lipid profile this morning.     Tentatively plan on follow-up in 1 year.    35 minutes spent reviewing prior records (including documentation, laboratory studies, cardiac testing/imaging), interview with patient along with physical exam, planning, and subsequent documentation/crafting of note).           History of Present Illness    Once again I would like to thank you again for asking me  to participate in the care of your patient, Loy Reid.  As you know, but to reiterate for my own records, Loy Reid is a 74 year old male with known coronary artery disease having had history of unstable ischemic syndrome with PCI with stent placement to the mid LAD in 2010.     Loy underwent repeat angiography 13 September 2023 and had successful PCI entire right coronary from crux to proximal segment with 2 long drug-eluting stents.  (3.5 x 48 Synergy drug-eluting stents x 2).     On interview, the Loy reports no chest pain symptoms concerning for angina.  Breathing has been stable.  He has been noted in cardiac rehab to have evidence of PVCs though patient unaware.    Further review of systems is otherwise negative/noncontributory (medical record and 13 point review of systems reviewed as well and pertinent positives noted).         Cardiac Diagnostics      Echocardiogram 10 January 2024 (personally reviewed):  Normal left ventricular size and systolic performance with a visually  estimated ejection fraction of 60-65%.  No significant valvular heart disease is identified on this study.  Normal right ventricular size and systolic performance.    Regadenoson MRI 31 August 2023 (precoronary angiogram):  Pharmacological Regadenoson stress cardiac MRI is positive for inducible myocardial ischemia in inferolateral wall.  Pharmacological Regadenoson stress ECG is nondiagnostic due to baseline ECG abnormality.  Normal left ventricular size, mild LVH and mild hypokinesis in inferolateral wall. The calculated left ventricular ejection fraction is 53%.  Previous nontransmural myocardial infarction in inferolateral wall from basal to mid segment with endomyocardial scare 40-50% of wall thickness. Rest myocardium is viable.  Normal right ventricular size and systolic function.  No obvious valvular disease.    Coronary angiogram 13 September 2023:  Mild in-stent restenosis in proximal one fourth of mid LAD stent.   Residual lumen appears good.  Mild disease distal to the stent but no severe stenoses.  Total occlusion of left circumflex after small first OM branch.  Occlusion distance appears at least 3 to 4 cm.  Distal vessel fills from left-sided collaterals.  Large dominant RCA with diffuse disease throughout with focal 90 to 95% stenosis distally just before the PDA takeoff.  Successful PCI entire right coronary from crux to proximal segment with 2 long drug-eluting stents.  SAUL-3 flow flow post PCI with 10% residual narrowing.    CT scan of chest 6 January 2023:   Couple tiny pulmonary nodules.  Mild airway thickening.  Emphysema.  Coronary artery calcification: Severe pronounced in the LAD.           Physical Examination       /54 (BP Location: Right arm, Patient Position: Sitting, Cuff Size: Adult Large)   Pulse 76   Resp 16   Wt 84.5 kg (186 lb 4.8 oz)   BMI 28.75 kg/m          Wt Readings from Last 3 Encounters:   12/12/24 84.5 kg (186 lb 4.8 oz)   07/10/24 82.7 kg (182 lb 6.4 oz)   03/06/24 84 kg (185 lb 3.2 oz)       The patient is alert and oriented times three. Sclerae are anicteric. Mucosal membranes are moist. Jugular venous pressure is normal. No significant adenopathy/thyromegally appreciated. Lungs are clear with good expansion. On cardiovascular exam, the patient has a regular S1 and S2. Abdomen is soft and non-tender. Extremities reveal no clubbing, cyanosis, or edema.         Family History/Social History/Risk Factors   Patient does not smoke.  Family history reviewed, and family history includes Cerebrovascular Disease in his father and mother; Coronary Artery Disease in his mother and sister; Heart Disease in his brother and father.        Medical History  Surgical History Family History Social History   Past Medical History:   Diagnosis Date     Myocardial infarction (H)      Prostate cancer (H)      PVD (peripheral vascular disease) (H)      Tobacco use      Past Surgical History:   Procedure  Laterality Date     CV CORONARY ANGIOGRAM N/A 2023    Procedure: Coronary Angiogram;  Surgeon: Thony Banda MD;  Location: [facility] CV     CV LEFT HEART CATH N/A 2023    Procedure: Left Heart Catheterization;  Surgeon: Thony Banda MD;  Location: [facility] CV     CV PCI STENT DRUG ELUTING N/A 2023    Procedure: Percutaneous Coronary Intervention Stent;  Surgeon: Thony Banda MD;  Location: [facility] CV     FOOT FRACTURE SURGERY       Family History   Problem Relation Age of Onset     Cerebrovascular Disease Mother      Coronary Artery Disease Mother      Cerebrovascular Disease Father      Heart Disease Father      Coronary Artery Disease Sister      Heart Disease Brother         Social History     Socioeconomic History     Marital status:      Spouse name: Not on file     Number of children: Not on file     Years of education: Not on file     Highest education level: Not on file   Occupational History     Not on file   Tobacco Use     Smoking status: Every Day     Current packs/day: 0.00     Types: Cigarettes     Start date: 2022     Last attempt to quit: 2023     Years since quittin.3     Smokeless tobacco: Never     Tobacco comments:     Has 1 cigarette every so often   Vaping Use     Vaping status: Never Used   Substance and Sexual Activity     Alcohol use: Not on file     Drug use: Not on file     Sexual activity: Not on file   Other Topics Concern     Not on file   Social History Narrative     Not on file     Social Drivers of Health     Financial Resource Strain: Not on file   Food Insecurity: Not on file   Transportation Needs: Not on file   Physical Activity: Not on file   Stress: Not on file   Social Connections: Not on file   Interpersonal Safety: Not on file   Housing Stability: Not on file           Medications  Allergies   Current Outpatient Medications   Medication Sig Dispense Refill     albuterol (PROAIR HFA/PROVENTIL  "HFA/VENTOLIN HFA) 108 (90 Base) MCG/ACT inhaler Inhale 2 puffs into the lungs every 6 hours as needed for shortness of breath, wheezing or cough 18 g 3     aspirin 81 MG EC tablet Take 81 mg by mouth daily       ezetimibe (ZETIA) 10 MG tablet Take 1 tablet (10 mg) by mouth daily 90 tablet 3     levothyroxine (SYNTHROID/LEVOTHROID) 112 MCG tablet Take 1 tablet (112 mcg) by mouth daily 90 tablet 3     lisinopril (ZESTRIL) 5 MG tablet TAKE 1 TABLET(5 MG) BY MOUTH DAILY 90 tablet 0     metoprolol succinate ER (TOPROL XL) 50 MG 24 hr tablet TAKE 1 TABLET(50 MG) BY MOUTH DAILY 90 tablet 0     nitroGLYcerin (NITROSTAT) 0.3 MG sublingual tablet Place 1 tablet (0.3 mg) under the tongue every 5 minutes as needed for chest pain 20 tablet 3     rosuvastatin (CRESTOR) 40 MG tablet TAKE 1 TABLET(40 MG) BY MOUTH DAILY 90 tablet 3     tamsulosin (FLOMAX) 0.4 MG capsule Take 1 capsule (0.4 mg) by mouth daily 90 capsule 2     tamsulosin (FLOMAX) 0.4 MG capsule Take 1 capsule (0.4 mg) by mouth daily 60 capsule 1     clopidogrel (PLAVIX) 75 MG tablet Take 1 tablet (75 mg) by mouth daily (Patient not taking: Reported on 12/12/2024) 90 tablet 3     No Known Allergies       Lab Results    Chemistry/lipid CBC Cardiac Enzymes/BNP/TSH/INR   Recent Labs   Lab Test 11/27/23  1117   CHOL 151   HDL 32*   LDL 97   TRIG 108     Recent Labs   Lab Test 11/27/23  1117 09/13/23  0713 12/08/22  1047   LDL 97 77 94     Recent Labs   Lab Test 09/13/23  0713      POTASSIUM 4.2   CHLORIDE 103   CO2 25   *   BUN 16.7   CR 1.31*   GFRESTIMATED 58*   CAROL 9.1     Recent Labs   Lab Test 09/13/23  0713 09/08/23  1108 05/31/23  1151   CR 1.31* 1.24* 1.4*     Recent Labs   Lab Test 12/08/22  1047   A1C 6.4*          Recent Labs   Lab Test 09/13/23  0713   WBC 11.8*   HGB 14.4   HCT 45.3   MCV 87        Recent Labs   Lab Test 09/13/23  0713 09/08/23  1108 12/08/22  1047   HGB 14.4 14.4 15.1    No results for input(s): \"TROPONINI\" in the last " "12460 hours.  No results for input(s): \"BNP\", \"NTBNPI\", \"NTBNP\" in the last 90552 hours.  Recent Labs   Lab Test 11/27/23  1117   TSH 2.62     No results for input(s): \"INR\" in the last 83362 hours.       Medications  Allergies   Current Outpatient Medications   Medication Sig Dispense Refill     albuterol (PROAIR HFA/PROVENTIL HFA/VENTOLIN HFA) 108 (90 Base) MCG/ACT inhaler Inhale 2 puffs into the lungs every 6 hours as needed for shortness of breath, wheezing or cough 18 g 3     aspirin 81 MG EC tablet Take 81 mg by mouth daily       ezetimibe (ZETIA) 10 MG tablet Take 1 tablet (10 mg) by mouth daily 90 tablet 3     levothyroxine (SYNTHROID/LEVOTHROID) 112 MCG tablet Take 1 tablet (112 mcg) by mouth daily 90 tablet 3     lisinopril (ZESTRIL) 5 MG tablet TAKE 1 TABLET(5 MG) BY MOUTH DAILY 90 tablet 0     metoprolol succinate ER (TOPROL XL) 50 MG 24 hr tablet TAKE 1 TABLET(50 MG) BY MOUTH DAILY 90 tablet 0     nitroGLYcerin (NITROSTAT) 0.3 MG sublingual tablet Place 1 tablet (0.3 mg) under the tongue every 5 minutes as needed for chest pain 20 tablet 3     rosuvastatin (CRESTOR) 40 MG tablet TAKE 1 TABLET(40 MG) BY MOUTH DAILY 90 tablet 3     tamsulosin (FLOMAX) 0.4 MG capsule Take 1 capsule (0.4 mg) by mouth daily 90 capsule 2     tamsulosin (FLOMAX) 0.4 MG capsule Take 1 capsule (0.4 mg) by mouth daily 60 capsule 1     clopidogrel (PLAVIX) 75 MG tablet Take 1 tablet (75 mg) by mouth daily (Patient not taking: Reported on 12/12/2024) 90 tablet 3      No Known Allergies       Lab Results   Lab Results   Component Value Date     09/13/2023    CO2 25 09/13/2023    CO2 27 11/26/2019    BUN 16.7 09/13/2023    BUN 22 11/26/2019     Lab Results   Component Value Date    WBC 11.8 09/13/2023    HGB 14.4 09/13/2023    HCT 45.3 09/13/2023    MCV 87 09/13/2023     09/13/2023     Lab Results   Component Value Date    CHOL 151 11/27/2023    TRIG 108 11/27/2023    HDL 32 11/27/2023     No results found for: \"INR\"  No " "results found for: \"BNP\"  No results found for: \"CKTOTAL\", \"CKMB\", \"TROPONINI\"  Lab Results   Component Value Date    TSH 2.62 11/27/2023                      Thank you for allowing me to participate in the care of your patient.      Sincerely,     Linda Ochoa MD     [facility]  cc:   Linda Ochoa MD  [address]  [place] [id number]      "

## 2024-12-12 NOTE — PROGRESS NOTES
[facility] [address], [place] [address]   [email or web address]   OFFICE: [phone]            Impression and Plan     1.  Coronary artery disease.  Loy has known coronary artery disease having had history of unstable ischemic syndrome with PCI with stent placement to the mid LAD in 2010.     Loy underwent repeat angiography 13 September 2023 and had successful PCI entire right coronary from crux to proximal segment with 2 long drug-eluting stents.  (3.5 x 48 Synergy drug-eluting stents x 2).  The circumflex was occluded and filled via collaterals from the left circulation.  Continue aspirin.  At this juncture, Loy can discontinue the clopidogrel.     2.  PVCs.  As noted below, Loy had been noted in cardiac rehab to have evidence of PVCs though asymptomatic.  He had a Holter monitor placed and just turned in the device 48 hours ago with results pending.  Will follow-up results accordingly.  Continue metoprolol as per problem #3.     3.  Hypertension.  Blood pressure is very reasonable in the office today at 102/42 mmHg.  Continue metoprolol succinate 50 mg daily.  Continue lisinopril..     4.  Dyslipidemia.  Lipid profile 27 November 2023 revealed LDL 97 mg/dL and HDL 32 mg/dL.  Continue rosuvastatin 40 mg daily.  Continue ezetimibe.  Plan to obtain repeat fasting lipid profile this morning.     Tentatively plan on follow-up in 1 year.    35 minutes spent reviewing prior records (including documentation, laboratory studies, cardiac testing/imaging), interview with patient along with physical exam, planning, and subsequent documentation/crafting of note).           History of Present Illness    Once again I would like to thank you again for asking me to participate in the care of your patient, Loy Reid.  As you know, but to reiterate for my own records, Loy Reid is a 74 year old male with known coronary artery disease having had history  of unstable ischemic syndrome with PCI with stent placement to the mid LAD in 2010.     Loy underwent repeat angiography 13 September 2023 and had successful PCI entire right coronary from crux to proximal segment with 2 long drug-eluting stents.  (3.5 x 48 Synergy drug-eluting stents x 2).     On interview, the Loy reports no chest pain symptoms concerning for angina.  Breathing has been stable.  He has been noted in cardiac rehab to have evidence of PVCs though patient unaware.    Further review of systems is otherwise negative/noncontributory (medical record and 13 point review of systems reviewed as well and pertinent positives noted).         Cardiac Diagnostics      Echocardiogram 10 January 2024 (personally reviewed):  Normal left ventricular size and systolic performance with a visually  estimated ejection fraction of 60-65%.  No significant valvular heart disease is identified on this study.  Normal right ventricular size and systolic performance.    Regadenoson MRI 31 August 2023 (precoronary angiogram):  Pharmacological Regadenoson stress cardiac MRI is positive for inducible myocardial ischemia in inferolateral wall.  Pharmacological Regadenoson stress ECG is nondiagnostic due to baseline ECG abnormality.  Normal left ventricular size, mild LVH and mild hypokinesis in inferolateral wall. The calculated left ventricular ejection fraction is 53%.  Previous nontransmural myocardial infarction in inferolateral wall from basal to mid segment with endomyocardial scare 40-50% of wall thickness. Rest myocardium is viable.  Normal right ventricular size and systolic function.  No obvious valvular disease.    Coronary angiogram 13 September 2023:  Mild in-stent restenosis in proximal one fourth of mid LAD stent.  Residual lumen appears good.  Mild disease distal to the stent but no severe stenoses.  Total occlusion of left circumflex after small first OM branch.  Occlusion distance appears at least 3 to 4 cm.   Distal vessel fills from left-sided collaterals.  Large dominant RCA with diffuse disease throughout with focal 90 to 95% stenosis distally just before the PDA takeoff.  Successful PCI entire right coronary from crux to proximal segment with 2 long drug-eluting stents.  SAUL-3 flow flow post PCI with 10% residual narrowing.    CT scan of chest 6 January 2023:   Couple tiny pulmonary nodules.  Mild airway thickening.  Emphysema.  Coronary artery calcification: Severe pronounced in the LAD.           Physical Examination       /54 (BP Location: Right arm, Patient Position: Sitting, Cuff Size: Adult Large)   Pulse 76   Resp 16   Wt 84.5 kg (186 lb 4.8 oz)   BMI 28.75 kg/m          Wt Readings from Last 3 Encounters:   12/12/24 84.5 kg (186 lb 4.8 oz)   07/10/24 82.7 kg (182 lb 6.4 oz)   03/06/24 84 kg (185 lb 3.2 oz)       The patient is alert and oriented times three. Sclerae are anicteric. Mucosal membranes are moist. Jugular venous pressure is normal. No significant adenopathy/thyromegally appreciated. Lungs are clear with good expansion. On cardiovascular exam, the patient has a regular S1 and S2. Abdomen is soft and non-tender. Extremities reveal no clubbing, cyanosis, or edema.         Family History/Social History/Risk Factors   Patient does not smoke.  Family history reviewed, and family history includes Cerebrovascular Disease in his father and mother; Coronary Artery Disease in his mother and sister; Heart Disease in his brother and father.        Medical History  Surgical History Family History Social History   Past Medical History:   Diagnosis Date    Myocardial infarction (H)     Prostate cancer (H)     PVD (peripheral vascular disease) (H)     Tobacco use      Past Surgical History:   Procedure Laterality Date    CV CORONARY ANGIOGRAM N/A 9/13/2023    Procedure: Coronary Angiogram;  Surgeon: Thony Banda MD;  Location: [facility] CATH LAB CV    CV LEFT HEART CATH N/A 9/13/2023    Procedure:  Left Heart Catheterization;  Surgeon: Thony Banda MD;  Location: [facility] CV    CV PCI STENT DRUG ELUTING N/A 2023    Procedure: Percutaneous Coronary Intervention Stent;  Surgeon: Thony Banda MD;  Location: [facility] CV    FOOT FRACTURE SURGERY       Family History   Problem Relation Age of Onset    Cerebrovascular Disease Mother     Coronary Artery Disease Mother     Cerebrovascular Disease Father     Heart Disease Father     Coronary Artery Disease Sister     Heart Disease Brother         Social History     Socioeconomic History    Marital status:      Spouse name: Not on file    Number of children: Not on file    Years of education: Not on file    Highest education level: Not on file   Occupational History    Not on file   Tobacco Use    Smoking status: Every Day     Current packs/day: 0.00     Types: Cigarettes     Start date: 2022     Last attempt to quit: 2023     Years since quittin.3    Smokeless tobacco: Never    Tobacco comments:     Has 1 cigarette every so often   Vaping Use    Vaping status: Never Used   Substance and Sexual Activity    Alcohol use: Not on file    Drug use: Not on file    Sexual activity: Not on file   Other Topics Concern    Not on file   Social History Narrative    Not on file     Social Drivers of Health     Financial Resource Strain: Not on file   Food Insecurity: Not on file   Transportation Needs: Not on file   Physical Activity: Not on file   Stress: Not on file   Social Connections: Not on file   Interpersonal Safety: Not on file   Housing Stability: Not on file           Medications  Allergies   Current Outpatient Medications   Medication Sig Dispense Refill    albuterol (PROAIR HFA/PROVENTIL HFA/VENTOLIN HFA) 108 (90 Base) MCG/ACT inhaler Inhale 2 puffs into the lungs every 6 hours as needed for shortness of breath, wheezing or cough 18 g 3    aspirin 81 MG EC tablet Take 81 mg by mouth daily      ezetimibe (ZETIA) 10 MG  "tablet Take 1 tablet (10 mg) by mouth daily 90 tablet 3    levothyroxine (SYNTHROID/LEVOTHROID) 112 MCG tablet Take 1 tablet (112 mcg) by mouth daily 90 tablet 3    lisinopril (ZESTRIL) 5 MG tablet TAKE 1 TABLET(5 MG) BY MOUTH DAILY 90 tablet 0    metoprolol succinate ER (TOPROL XL) 50 MG 24 hr tablet TAKE 1 TABLET(50 MG) BY MOUTH DAILY 90 tablet 0    nitroGLYcerin (NITROSTAT) 0.3 MG sublingual tablet Place 1 tablet (0.3 mg) under the tongue every 5 minutes as needed for chest pain 20 tablet 3    rosuvastatin (CRESTOR) 40 MG tablet TAKE 1 TABLET(40 MG) BY MOUTH DAILY 90 tablet 3    tamsulosin (FLOMAX) 0.4 MG capsule Take 1 capsule (0.4 mg) by mouth daily 90 capsule 2    tamsulosin (FLOMAX) 0.4 MG capsule Take 1 capsule (0.4 mg) by mouth daily 60 capsule 1    clopidogrel (PLAVIX) 75 MG tablet Take 1 tablet (75 mg) by mouth daily (Patient not taking: Reported on 12/12/2024) 90 tablet 3     No Known Allergies       Lab Results    Chemistry/lipid CBC Cardiac Enzymes/BNP/TSH/INR   Recent Labs   Lab Test 11/27/23  1117   CHOL 151   HDL 32*   LDL 97   TRIG 108     Recent Labs   Lab Test 11/27/23  1117 09/13/23  0713 12/08/22  1047   LDL 97 77 94     Recent Labs   Lab Test 09/13/23  0713      POTASSIUM 4.2   CHLORIDE 103   CO2 25   *   BUN 16.7   CR 1.31*   GFRESTIMATED 58*   CAROL 9.1     Recent Labs   Lab Test 09/13/23  0713 09/08/23  1108 05/31/23  1151   CR 1.31* 1.24* 1.4*     Recent Labs   Lab Test 12/08/22  1047   A1C 6.4*          Recent Labs   Lab Test 09/13/23  0713   WBC 11.8*   HGB 14.4   HCT 45.3   MCV 87        Recent Labs   Lab Test 09/13/23  0713 09/08/23  1108 12/08/22  1047   HGB 14.4 14.4 15.1    No results for input(s): \"TROPONINI\" in the last 18735 hours.  No results for input(s): \"BNP\", \"NTBNPI\", \"NTBNP\" in the last 95873 hours.  Recent Labs   Lab Test 11/27/23  1117   TSH 2.62     No results for input(s): \"INR\" in the last 97372 hours.       Medications  Allergies   Current " "Outpatient Medications   Medication Sig Dispense Refill    albuterol (PROAIR HFA/PROVENTIL HFA/VENTOLIN HFA) 108 (90 Base) MCG/ACT inhaler Inhale 2 puffs into the lungs every 6 hours as needed for shortness of breath, wheezing or cough 18 g 3    aspirin 81 MG EC tablet Take 81 mg by mouth daily      ezetimibe (ZETIA) 10 MG tablet Take 1 tablet (10 mg) by mouth daily 90 tablet 3    levothyroxine (SYNTHROID/LEVOTHROID) 112 MCG tablet Take 1 tablet (112 mcg) by mouth daily 90 tablet 3    lisinopril (ZESTRIL) 5 MG tablet TAKE 1 TABLET(5 MG) BY MOUTH DAILY 90 tablet 0    metoprolol succinate ER (TOPROL XL) 50 MG 24 hr tablet TAKE 1 TABLET(50 MG) BY MOUTH DAILY 90 tablet 0    nitroGLYcerin (NITROSTAT) 0.3 MG sublingual tablet Place 1 tablet (0.3 mg) under the tongue every 5 minutes as needed for chest pain 20 tablet 3    rosuvastatin (CRESTOR) 40 MG tablet TAKE 1 TABLET(40 MG) BY MOUTH DAILY 90 tablet 3    tamsulosin (FLOMAX) 0.4 MG capsule Take 1 capsule (0.4 mg) by mouth daily 90 capsule 2    tamsulosin (FLOMAX) 0.4 MG capsule Take 1 capsule (0.4 mg) by mouth daily 60 capsule 1    clopidogrel (PLAVIX) 75 MG tablet Take 1 tablet (75 mg) by mouth daily (Patient not taking: Reported on 12/12/2024) 90 tablet 3      No Known Allergies       Lab Results   Lab Results   Component Value Date     09/13/2023    CO2 25 09/13/2023    CO2 27 11/26/2019    BUN 16.7 09/13/2023    BUN 22 11/26/2019     Lab Results   Component Value Date    WBC 11.8 09/13/2023    HGB 14.4 09/13/2023    HCT 45.3 09/13/2023    MCV 87 09/13/2023     09/13/2023     Lab Results   Component Value Date    CHOL 151 11/27/2023    TRIG 108 11/27/2023    HDL 32 11/27/2023     No results found for: \"INR\"  No results found for: \"BNP\"  No results found for: \"CKTOTAL\", \"CKMB\", \"TROPONINI\"  Lab Results   Component Value Date    TSH 2.62 11/27/2023                  "

## 2025-01-05 DIAGNOSIS — I10 ESSENTIAL HYPERTENSION: ICD-10-CM

## 2025-01-06 ENCOUNTER — LAB (OUTPATIENT)
Dept: INFUSION THERAPY | Facility: HOSPITAL | Age: 75
End: 2025-01-06
Attending: RADIOLOGY
Payer: MEDICARE

## 2025-01-06 DIAGNOSIS — C61 PROSTATE CANCER (H): ICD-10-CM

## 2025-01-06 LAB — PSA SERPL DL<=0.01 NG/ML-MCNC: 0.66 NG/ML (ref 0–6.5)

## 2025-01-06 PROCEDURE — 36415 COLL VENOUS BLD VENIPUNCTURE: CPT

## 2025-01-06 PROCEDURE — 84153 ASSAY OF PSA TOTAL: CPT

## 2025-01-06 RX ORDER — METOPROLOL SUCCINATE 50 MG/1
50 TABLET, EXTENDED RELEASE ORAL DAILY
Qty: 90 TABLET | Refills: 0 | Status: SHIPPED | OUTPATIENT
Start: 2025-01-06

## 2025-01-08 ENCOUNTER — OFFICE VISIT (OUTPATIENT)
Dept: RADIATION ONCOLOGY | Facility: CLINIC | Age: 75
End: 2025-01-08
Attending: RADIOLOGY
Payer: MEDICARE

## 2025-01-08 VITALS
BODY MASS INDEX: 28.78 KG/M2 | SYSTOLIC BLOOD PRESSURE: 133 MMHG | WEIGHT: 186.5 LBS | RESPIRATION RATE: 20 BRPM | OXYGEN SATURATION: 96 % | HEART RATE: 51 BPM | DIASTOLIC BLOOD PRESSURE: 65 MMHG | TEMPERATURE: 97.5 F

## 2025-01-08 DIAGNOSIS — C61 PROSTATE CANCER (H): Primary | ICD-10-CM

## 2025-01-08 PROCEDURE — G0463 HOSPITAL OUTPT CLINIC VISIT: HCPCS | Performed by: RADIOLOGY

## 2025-01-08 ASSESSMENT — PAIN SCALES - GENERAL: PAINLEVEL_OUTOF10: NO PAIN (0)

## 2025-01-08 NOTE — LETTER
1/8/2025      Loy Reid  [address] [address]      Dear Colleague,    Thank you for referring your patient, Loy Reid, to the [facility] RADIATION ONCOLOGY [place]. Please see a copy of my visit note below.    [facility] Radiation Oncology Follow Up     Patient: Loy Reid  MRN: [id number]  Date of Service: 01/08/2025       DISEASE TREATED:  Unfavorable intermediate risk prostate cancer, clinical stage T2 a N0 M0, Welling grade 4+3 = 7 involving 3/15 cores, and a pretherapy PSA of 7.20.  Staging work-up including bone scan, CT abdomen pelvics and MRI scan showed no evidence of lymph nodes and bone metastasis.         TYPE OF RADIATION THERAPY ADMINISTERED:  7020 cGy in 26 treatments given from 9/20/2023 - 10/27/2023.      INTERVAL SINCE COMPLETION OF RADIATION THERAPY:  1 year and 3 months.      SUBJECTIVE:  Mr. Reid is a 74 year old male with other medical condition including COPD, coronary artery disease and history of MI with stents placement in 2010.  The patient presented with history of elevation of PSA to 7.20 on 12/8/2022 for which he was a seeking further evaluation.  MRI of pelvics on 3/6/2023 showed 30 g prostate gland with a 21 x 21 mm PI-RADS 5 lesion in the right and the left base and mid gland transitional zone at the 10-1 o'clock position suspicious for malignancy.  There is a long segment capsular abutment indicating moderate suspicious of minimum extraprostatic extension.  There is no evidence of lymph nodes and bone metastasis.  Patient underwent ultrasound-guided needle biopsy on 5/5/2023.  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  The remaining 12 cores of biopsy was negative. Staging work-up including bone scan, CT abdomen pelvics and MRI scan showed no evidence of lymph nodes and bone metastasis.  The patient received  definitive radiation therapy for his prostate cancer with a total dose of 7020 cGy in 26 treatments given from 9/20/2023 - 10/27/2023.  He tolerated radiation therapy well with minimal side effect.        The patient has been recovering well since surgery.  His bowel and bladder functions has been returned to his normal status. The patient still has a chronic dysuria and has been taking Flomax with moderate relief.  The patient is here for routine post therapy office follow-up.     Medications were reviewed and are up to date on EPIC.    The following portions of the patient's history were reviewed and updated as appropriate: allergies, current medications, past family history, past medical history, past social history, past surgical history and problem list.    Review of Systems:      General  Constitutional  Constitutional (WDL): Exceptions to WDL  Fatigue: Fatigue relieved by rest  EENT  Eye Disorders  Eye Disorder (WDL): All eye disorder elements are within defined limits (wears glasses)  Ear Disorders  Ear Disorder (WDL): All ear disorder elements are within defined limits  Respiratory  Respiratory  Respiratory (WDL): Exceptions to WDL  Cough: Mild symptoms OR nonprescription intervention indicated  Dyspnea: Shortness of breath with moderate exertion  Cardiovascular  Cardiovascular  Cardiovascular (WDL): All cardiovascular elements are within defined limits (Hx of MI, no pacemaker)  Gastrointestinal  Gastrointestinal  Gastrointestinal (WDL): Exceptions to WDL  Diarrhea: Absent or within normal limits  Musculoskeletal  Musculoskeletal and Connective Tissue Disorders  Musculoskeletal & Connective (WDL): All musculoskeletal & connective elements are within defined limits  Integumentary  Integumentary  Integumentary (WDL): All integumentary elements are within defined limits  Neurological  Neurosensory  Neurosensory (WDL): All neurosensory elements are within defined  limits  Genitourinary/Reproductive  Genitourinary  Genitourinary (WDL): Exceptions to WDL  Urinary Frequency: Present (nocturia x1-2)  Lymphatic  Lymph System Disorders  Lymph (WDL): All lymph elements are within defined limits  Pain  Pain Score: No Pain (0)  AUA Assessment                                                              Accompanied by  Accompanied By: spouse (wife)    Objective:     PHYSICAL EXAMINATION:    /65 (BP Location: Right arm, Patient Position: Sitting, Cuff Size: Adult Regular)   Pulse 51   Temp 97.5  F (36.4  C) (Oral)   Resp 20   Wt 84.6 kg (186 lb 8 oz)   SpO2 96%   BMI 28.78 kg/m      Gen: Alert, in NAD  Eyes: PERRL, EOMI, sclera anicteric  Pulm: No wheezing, stridor or respiratory distress  CV: Well-perfused, no cyanosis, no pedal edema  Back: No step-offs or pain to palpation along the thoracolumbar spine  Rectal: Deferred  : Deferred  Musculoskeletal: Normal muscle bulk and tone  Skin: Normal color and turgor  Neurologic: A/Ox3, CN II-XII intact, normal gait and station  Psychiatric: Appropriate mood and affect     Prostate Specific Antigen Screen   Date Value Ref Range Status   12/08/2022 7.20 (H) 0.00 - 6.50 ng/mL Final   11/26/2019 6.9 (H) 0.0 - 4.5 ng/mL Final     PSA Tumor Marker   Date Value Ref Range Status   01/06/2025 0.66 0.00 - 6.50 ng/mL Final   07/08/2024 1.48 0.00 - 6.50 ng/mL Final   02/21/2024 3.06 0.00 - 6.50 ng/mL Final   11/27/2023 6.18 0.00 - 6.50 ng/mL Final   06/08/2023 6.76 (H) 0.00 - 6.50 ng/mL Final      Impression     74 year old male with a diagnosis of unfavorable intermediate risk prostate cancer, clinical stage T2 a N0 M0, Aaron grade 4+3 = 7 involving 3/15 cores, and a pretherapy PSA of 7.20.  Staging work-up including bone scan, CT abdomen pelvics and MRI scan showed no evidence of lymph nodes and bone metastasis.  The patient received definitive radiation therapy for his prostate cancer with a total dose of 7020 cGy in 26 treatments  "given from 9/20/2023 - 10/27/2023. AUA: 12/35     Assessment & Plan:     1.  The patient has been stable since radiation therapy with mild to moderate improvement of his urinary function.  His PSA showed additional decline at this time.  We will check his PSA again in 6 months and office follow-up     2.  Continue follow-up with Dr. López Barahona, PCP and Dr. Lugo, urology as planned.     Pain Management Plan: NA     Face to face time  20 minutes with > 80% spent on consultation, education and coordination of care.          Lacey Young MD  Department of Radiation Oncology   [facility] Radiation Oncology  Tel: [phone]  Page: [phone]    [facility]  [address] [place] [address]     [place] [address] [facility] [place] [address]    CC:  Patient Care Team:  López Barahona MD as PCP - General  López Barahona MD as Assigned PCP  Burak Lugo MD as MD (Urology)  Burak Lugo MD as Assigned Surgical Provider  Lacey Young MD as MD (Radiation Oncology)  Linda Ochoa MD as MD (Cardiovascular Disease)  Lacey Young MD as Assigned Cancer Care Provider  Linda Ochoa MD as Assigned Heart and Vascular Provider      Oncology Rooming Note    January 8, 2025 11:03 AM   Loy Reid is a 74 year old male who presents for:    Chief Complaint   Patient presents with     Oncology Clinic Visit     Follow up with Dr. Young     Prostate Cancer     Initial Vitals: /65 (BP Location: Right arm, Patient Position: Sitting, Cuff Size: Adult Regular)   Pulse 51   Temp 97.5  F (36.4  C) (Oral)   Resp 20   Wt 84.6 kg (186 lb 8 oz)   SpO2 96%   BMI 28.78 kg/m   Estimated body mass index is 28.78 kg/m  as calculated from the following:    Height as of 9/26/23: 1.715 m (5' 7.5\").    Weight as of this encounter: 84.6 kg (186 lb 8 oz). Body surface area is 2.01 meters squared.  No Pain (0) Comment: Data Unavailable   No LMP for male patient.  Allergies " reviewed: Yes  Medications reviewed: Yes    Medications: Medication refills not needed today.  Pharmacy name entered into Myla: [id number] [address] AT [facility] [address]    Frailty Screening:   Is the patient here for a new oncology consult visit in cancer care? 2. No      Clinical concerns: Patient here ambulatory accompanied by wife for follow-up status post radiation for his prostate cancer.  PSA and AUA completed.  Patient has questions about continued use of tamsulosin.  Plan return to clinic for follow-up as directed by provider.   Dr. Young was notified.      Mary Renteria RN                Again, thank you for allowing me to participate in the care of your patient.        Sincerely,        Lacey Young MD    Electronically signed

## 2025-01-08 NOTE — PROGRESS NOTES
"Oncology Rooming Note    January 8, 2025 11:03 AM   Loy Reid is a 74 year old male who presents for:    Chief Complaint   Patient presents with    Oncology Clinic Visit     Follow up with Dr. Young    Prostate Cancer     Initial Vitals: /65 (BP Location: Right arm, Patient Position: Sitting, Cuff Size: Adult Regular)   Pulse 51   Temp 97.5  F (36.4  C) (Oral)   Resp 20   Wt 84.6 kg (186 lb 8 oz)   SpO2 96%   BMI 28.78 kg/m   Estimated body mass index is 28.78 kg/m  as calculated from the following:    Height as of 9/26/23: 1.715 m (5' 7.5\").    Weight as of this encounter: 84.6 kg (186 lb 8 oz). Body surface area is 2.01 meters squared.  No Pain (0) Comment: Data Unavailable   No LMP for male patient.  Allergies reviewed: Yes  Medications reviewed: Yes    Medications: Medication refills not needed today.  Pharmacy name entered into P[facility]Noise: [id number] [place] [address] AT [facility] [address]    Frailty Screening:   Is the patient here for a new oncology consult visit in cancer care? 2. No      Clinical concerns: Patient here ambulatory accompanied by wife for follow-up status post radiation for his prostate cancer.  PSA and AUA completed.  Patient has questions about continued use of tamsulosin.  Plan return to clinic for follow-up as directed by provider.   Dr. Young was notified.      Mary Renteria RN              "

## 2025-01-08 NOTE — PROGRESS NOTES
[facility] Radiation Oncology Follow Up     Patient: Loy Reid  MRN: [id number]  Date of Service: 01/08/2025       DISEASE TREATED:  Unfavorable intermediate risk prostate cancer, clinical stage T2 a N0 M0, Eidson grade 4+3 = 7 involving 3/15 cores, and a pretherapy PSA of 7.20.  Staging work-up including bone scan, CT abdomen pelvics and MRI scan showed no evidence of lymph nodes and bone metastasis.         TYPE OF RADIATION THERAPY ADMINISTERED:  7020 cGy in 26 treatments given from 9/20/2023 - 10/27/2023.      INTERVAL SINCE COMPLETION OF RADIATION THERAPY:  1 year and 3 months.      SUBJECTIVE:  Mr. Reid is a 74 year old male with other medical condition including COPD, coronary artery disease and history of MI with stents placement in 2010.  The patient presented with history of elevation of PSA to 7.20 on 12/8/2022 for which he was a seeking further evaluation.  MRI of pelvics on 3/6/2023 showed 30 g prostate gland with a 21 x 21 mm PI-RADS 5 lesion in the right and the left base and mid gland transitional zone at the 10-1 o'clock position suspicious for malignancy.  There is a long segment capsular abutment indicating moderate suspicious of minimum extraprostatic extension.  There is no evidence of lymph nodes and bone metastasis.  Patient underwent ultrasound-guided needle biopsy on 5/5/2023.  The pathology from the targeted lesion showed prostatic acinar Adenocarcinoma, Eidson score 4+3 = 7 involving 3 of 3 cores, representing 30% total core volume.  The remaining 12 cores of biopsy was negative. Staging work-up including bone scan, CT abdomen pelvics and MRI scan showed no evidence of lymph nodes and bone metastasis.  The patient received definitive radiation therapy for his prostate cancer with a total dose of 7020 cGy in 26 treatments given from 9/20/2023 - 10/27/2023.  He tolerated radiation therapy well with minimal side effect.        The patient has been recovering well since surgery.   His bowel and bladder functions has been returned to his normal status. The patient still has a chronic dysuria and has been taking Flomax with moderate relief.  The patient is here for routine post therapy office follow-up.     Medications were reviewed and are up to date on EPIC.    The following portions of the patient's history were reviewed and updated as appropriate: allergies, current medications, past family history, past medical history, past social history, past surgical history and problem list.    Review of Systems:      General  Constitutional  Constitutional (WDL): Exceptions to WDL  Fatigue: Fatigue relieved by rest  EENT  Eye Disorders  Eye Disorder (WDL): All eye disorder elements are within defined limits (wears glasses)  Ear Disorders  Ear Disorder (WDL): All ear disorder elements are within defined limits  Respiratory  Respiratory  Respiratory (WDL): Exceptions to WDL  Cough: Mild symptoms OR nonprescription intervention indicated  Dyspnea: Shortness of breath with moderate exertion  Cardiovascular  Cardiovascular  Cardiovascular (WDL): All cardiovascular elements are within defined limits (Hx of MI, no pacemaker)  Gastrointestinal  Gastrointestinal  Gastrointestinal (WDL): Exceptions to WDL  Diarrhea: Absent or within normal limits  Musculoskeletal  Musculoskeletal and Connective Tissue Disorders  Musculoskeletal & Connective (WDL): All musculoskeletal & connective elements are within defined limits  Integumentary  Integumentary  Integumentary (WDL): All integumentary elements are within defined limits  Neurological  Neurosensory  Neurosensory (WDL): All neurosensory elements are within defined limits  Genitourinary/Reproductive  Genitourinary  Genitourinary (WDL): Exceptions to WDL  Urinary Frequency: Present (nocturia x1-2)  Lymphatic  Lymph System Disorders  Lymph (WDL): All lymph elements are within defined limits  Pain  Pain Score: No Pain (0)  AUA Assessment                                                               Accompanied by  Accompanied By: spouse (wife)    Objective:     PHYSICAL EXAMINATION:    /65 (BP Location: Right arm, Patient Position: Sitting, Cuff Size: Adult Regular)   Pulse 51   Temp 97.5  F (36.4  C) (Oral)   Resp 20   Wt 84.6 kg (186 lb 8 oz)   SpO2 96%   BMI 28.78 kg/m      Gen: Alert, in NAD  Eyes: PERRL, EOMI, sclera anicteric  Pulm: No wheezing, stridor or respiratory distress  CV: Well-perfused, no cyanosis, no pedal edema  Back: No step-offs or pain to palpation along the thoracolumbar spine  Rectal: Deferred  : Deferred  Musculoskeletal: Normal muscle bulk and tone  Skin: Normal color and turgor  Neurologic: A/Ox3, CN II-XII intact, normal gait and station  Psychiatric: Appropriate mood and affect     Prostate Specific Antigen Screen   Date Value Ref Range Status   12/08/2022 7.20 (H) 0.00 - 6.50 ng/mL Final   11/26/2019 6.9 (H) 0.0 - 4.5 ng/mL Final     PSA Tumor Marker   Date Value Ref Range Status   01/06/2025 0.66 0.00 - 6.50 ng/mL Final   07/08/2024 1.48 0.00 - 6.50 ng/mL Final   02/21/2024 3.06 0.00 - 6.50 ng/mL Final   11/27/2023 6.18 0.00 - 6.50 ng/mL Final   06/08/2023 6.76 (H) 0.00 - 6.50 ng/mL Final      Impression     74 year old male with a diagnosis of unfavorable intermediate risk prostate cancer, clinical stage T2 a N0 M0, [place] grade 4+3 = 7 involving 3/15 cores, and a pretherapy PSA of 7.20.  Staging work-up including bone scan, CT abdomen pelvics and MRI scan showed no evidence of lymph nodes and bone metastasis.  The patient received definitive radiation therapy for his prostate cancer with a total dose of 7020 cGy in 26 treatments given from 9/20/2023 - 10/27/2023. AUA: 12/35     Assessment & Plan:     1.  The patient has been stable since radiation therapy with mild to moderate improvement of his urinary function.  His PSA showed additional decline at this time.  We will check his PSA again in 6 months and office follow-up     2.   Continue follow-up with Dr. López Barahona, PCP and Dr. Lugo, urology as planned.     Pain Management Plan: NA     Face to face time  20 minutes with > 80% spent on consultation, education and coordination of care.          Lacey Young MD  Department of Radiation Oncology   [facility] Radiation Oncology  Tel: [phone]  Page: [phone]    [facility]  [address] [place] [address]     [facility]   [address]  [place] [address]    CC:  Patient Care Team:  López Barahona MD as PCP - General  López Barahona MD as Assigned PCP  Burak Lugo MD as MD (Urology)  Burak Lugo MD as Assigned Surgical Provider  Lacey Young MD as MD (Radiation Oncology)  Linda Ochoa MD as MD (Cardiovascular Disease)  Lacey Young MD as Assigned Cancer Care Provider  Linda Ochoa MD as Assigned Heart and Vascular Provider

## 2025-02-18 ENCOUNTER — TELEPHONE (OUTPATIENT)
Dept: INTERNAL MEDICINE | Facility: CLINIC | Age: 75
End: 2025-02-18
Payer: MEDICARE

## 2025-02-18 NOTE — LETTER
February 18, 2025      Loy Reid  [address] [address]      Your healthcare team cares about your health. To provide you with the best care, we have reviewed your chart and based on our findings, we see that you are due to:     PREVENTATIVE VISIT: Annual Medicare Wellness:Schedule an Annual Medicare Wellness Exam. Please call your [facility] [place] clinic to set up your appointment.    If you have already completed these items, please contact the clinic via phone or Mychart so your care team can review and update your records.  Thank you for choosing [facility] for your healthcare needs. For any questions, concerns, or to schedule an appointment please contact the clinic.     Healthy Regards,    Your [facility] Care Team

## 2025-02-18 NOTE — TELEPHONE ENCOUNTER
Patient Quality Outreach    Patient is due for the following:   Physical Annual Wellness Visit    Action(s) Taken:   Patient to call back and schedule an AWV    Type of outreach:    Sent [employer] message.    Questions for provider review:    None           Joy C Steinert, CMA

## 2025-04-02 DIAGNOSIS — I10 ESSENTIAL HYPERTENSION: ICD-10-CM

## 2025-04-02 RX ORDER — METOPROLOL SUCCINATE 50 MG/1
50 TABLET, EXTENDED RELEASE ORAL DAILY
Qty: 90 TABLET | Refills: 1 | Status: SHIPPED | OUTPATIENT
Start: 2025-04-02

## 2025-04-04 DIAGNOSIS — E03.4 HYPOTHYROIDISM DUE TO ACQUIRED ATROPHY OF THYROID: ICD-10-CM

## 2025-04-07 RX ORDER — LEVOTHYROXINE SODIUM 112 UG/1
112 TABLET ORAL DAILY
Qty: 90 TABLET | Refills: 1 | Status: SHIPPED | OUTPATIENT
Start: 2025-04-07

## 2025-04-11 ENCOUNTER — MYC REFILL (OUTPATIENT)
Dept: CARDIOLOGY | Facility: CLINIC | Age: 75
End: 2025-04-11
Payer: MEDICARE

## 2025-04-11 DIAGNOSIS — I10 HYPERTENSION, UNSPECIFIED TYPE: ICD-10-CM

## 2025-04-14 RX ORDER — LISINOPRIL 5 MG/1
5 TABLET ORAL DAILY
Qty: 90 TABLET | Refills: 3 | Status: SHIPPED | OUTPATIENT
Start: 2025-04-14

## 2025-06-10 SDOH — HEALTH STABILITY: PHYSICAL HEALTH
ON AVERAGE, HOW MANY DAYS PER WEEK DO YOU ENGAGE IN MODERATE TO STRENUOUS EXERCISE (LIKE A BRISK WALK)?: PATIENT DECLINED

## 2025-06-10 SDOH — HEALTH STABILITY: PHYSICAL HEALTH: ON AVERAGE, HOW MANY MINUTES DO YOU ENGAGE IN EXERCISE AT THIS LEVEL?: PATIENT DECLINED

## 2025-06-10 ASSESSMENT — SOCIAL DETERMINANTS OF HEALTH (SDOH): HOW OFTEN DO YOU GET TOGETHER WITH FRIENDS OR RELATIVES?: MORE THAN THREE TIMES A WEEK

## 2025-06-11 ENCOUNTER — OFFICE VISIT (OUTPATIENT)
Dept: INTERNAL MEDICINE | Facility: CLINIC | Age: 75
End: 2025-06-11
Payer: MEDICARE

## 2025-06-11 ENCOUNTER — RESULTS FOLLOW-UP (OUTPATIENT)
Dept: INTERNAL MEDICINE | Facility: CLINIC | Age: 75
End: 2025-06-11

## 2025-06-11 VITALS
WEIGHT: 195 LBS | OXYGEN SATURATION: 95 % | RESPIRATION RATE: 16 BRPM | HEART RATE: 62 BPM | BODY MASS INDEX: 30.61 KG/M2 | HEIGHT: 67 IN | SYSTOLIC BLOOD PRESSURE: 128 MMHG | TEMPERATURE: 97.8 F | DIASTOLIC BLOOD PRESSURE: 56 MMHG

## 2025-06-11 DIAGNOSIS — Z23 PNEUMOCOCCAL VACCINATION ADMINISTERED AT CURRENT VISIT: ICD-10-CM

## 2025-06-11 DIAGNOSIS — J43.9 PULMONARY EMPHYSEMA, UNSPECIFIED EMPHYSEMA TYPE (H): ICD-10-CM

## 2025-06-11 DIAGNOSIS — E78.2 MIXED HYPERLIPIDEMIA: Chronic | ICD-10-CM

## 2025-06-11 DIAGNOSIS — I10 ESSENTIAL HYPERTENSION: ICD-10-CM

## 2025-06-11 DIAGNOSIS — Z72.0 TOBACCO ABUSE: ICD-10-CM

## 2025-06-11 DIAGNOSIS — C61 PROSTATE CANCER (H): ICD-10-CM

## 2025-06-11 DIAGNOSIS — Z00.00 ROUTINE GENERAL MEDICAL EXAMINATION AT A HEALTH CARE FACILITY: Primary | ICD-10-CM

## 2025-06-11 DIAGNOSIS — E03.4 HYPOTHYROIDISM DUE TO ACQUIRED ATROPHY OF THYROID: ICD-10-CM

## 2025-06-11 DIAGNOSIS — Z95.5 S/P DRUG ELUTING CORONARY STENT PLACEMENT: ICD-10-CM

## 2025-06-11 PROBLEM — N18.31 CHRONIC KIDNEY DISEASE, STAGE 3A (H): Status: ACTIVE | Noted: 2025-06-11

## 2025-06-11 PROBLEM — N18.31 CHRONIC KIDNEY DISEASE, STAGE 3A (H): Status: RESOLVED | Noted: 2025-06-11 | Resolved: 2025-06-11

## 2025-06-11 PROBLEM — E11.59 TYPE 2 DIABETES MELLITUS WITH OTHER CIRCULATORY COMPLICATION, WITHOUT LONG-TERM CURRENT USE OF INSULIN (H): Status: ACTIVE | Noted: 2025-06-11

## 2025-06-11 PROBLEM — E11.59 TYPE 2 DIABETES MELLITUS WITH OTHER CIRCULATORY COMPLICATION, WITHOUT LONG-TERM CURRENT USE OF INSULIN (H): Status: RESOLVED | Noted: 2025-06-11 | Resolved: 2025-06-11

## 2025-06-11 LAB
ALBUMIN SERPL BCG-MCNC: 4 G/DL (ref 3.5–5.2)
ALP SERPL-CCNC: 115 U/L (ref 40–150)
ALT SERPL W P-5'-P-CCNC: 37 U/L (ref 0–70)
ANION GAP SERPL CALCULATED.3IONS-SCNC: 8 MMOL/L (ref 7–15)
AST SERPL W P-5'-P-CCNC: 34 U/L (ref 0–45)
BILIRUB SERPL-MCNC: 0.5 MG/DL
BUN SERPL-MCNC: 20.8 MG/DL (ref 8–23)
CALCIUM SERPL-MCNC: 9.2 MG/DL (ref 8.8–10.4)
CHLORIDE SERPL-SCNC: 103 MMOL/L (ref 98–107)
CHOLEST SERPL-MCNC: 89 MG/DL
CREAT SERPL-MCNC: 1.47 MG/DL (ref 0.67–1.17)
EGFRCR SERPLBLD CKD-EPI 2021: 50 ML/MIN/1.73M2
ERYTHROCYTE [DISTWIDTH] IN BLOOD BY AUTOMATED COUNT: 14.3 % (ref 10–15)
EST. AVERAGE GLUCOSE BLD GHB EST-MCNC: 154 MG/DL
FASTING STATUS PATIENT QL REPORTED: NO
FASTING STATUS PATIENT QL REPORTED: NO
GLUCOSE SERPL-MCNC: 107 MG/DL (ref 70–99)
HBA1C MFR BLD: 7 % (ref 0–5.6)
HCO3 SERPL-SCNC: 28 MMOL/L (ref 22–29)
HCT VFR BLD AUTO: 45.8 % (ref 40–53)
HDLC SERPL-MCNC: 27 MG/DL
HGB BLD-MCNC: 14.9 G/DL (ref 13.3–17.7)
LDLC SERPL CALC-MCNC: 38 MG/DL
MCH RBC QN AUTO: 28.8 PG (ref 26.5–33)
MCHC RBC AUTO-ENTMCNC: 32.5 G/DL (ref 31.5–36.5)
MCV RBC AUTO: 88 FL (ref 78–100)
NONHDLC SERPL-MCNC: 62 MG/DL
PLATELET # BLD AUTO: 160 10E3/UL (ref 150–450)
POTASSIUM SERPL-SCNC: 4.9 MMOL/L (ref 3.4–5.3)
PROT SERPL-MCNC: 6.4 G/DL (ref 6.4–8.3)
RBC # BLD AUTO: 5.18 10E6/UL (ref 4.4–5.9)
SODIUM SERPL-SCNC: 139 MMOL/L (ref 135–145)
TRIGL SERPL-MCNC: 120 MG/DL
TSH SERPL DL<=0.005 MIU/L-ACNC: 7.27 UIU/ML (ref 0.3–4.2)
WBC # BLD AUTO: 7.9 10E3/UL (ref 4–11)

## 2025-06-11 NOTE — PROGRESS NOTES
Preventive Care Visit  [facility]  CUATE KING MD, Internal Medicine  Jun 11, 2025    Minoo Granado is a 74 year old, presenting for the following:  Physical        6/11/2025    10:27 AM   Additional Questions   Roomed by Lona ALEXANDER  Counseling  Appropriate preventive services were addressed with this patient via screening, questionnaire, or discussion as appropriate for fall prevention, nutrition, physical activity, Tobacco-use cessation, social engagement, weight loss and cognition.  Checklist reviewing preventive services available has been given to the patient.  Reviewed patient's diet, addressing concerns and/or questions.   If at risk for lack of exercise, the patient was provided with information to increase physical activity for the benefit of well-being.   If at risk for social isolation, the patient was provided with information about the benefit of social connection.   The patient was reminded to see the dentist every 6 months.   If at risk for psychosocial distress, the patient was provided with information to reduce risk.     Advance Care Planning    Discussed advance care planning with patient; informed AVS has link to [facility].        6/10/2025   General Health   How would you rate your overall physical health? (!) FAIR   Feel stress (tense, anxious, or unable to sleep) Only a little   (!) STRESS CONCERN      6/10/2025   Nutrition   Diet: Regular (no restrictions)         6/10/2025   Exercise   Days per week of moderate/strenous exercise Patient declined   Average minutes spent exercising at this level Patient declined         6/10/2025   Social Factors   Frequency of gathering with friends or relatives More than three times a week   Worry food won't last until get money to buy more No   Food not last or not have enough money for food? No   Do you have housing? (Housing is defined as stable permanent housing and does not include staying outside in a car, in a  tent, in an abandoned building, in an overnight shelter, or couch-surfing.) Yes   Are you worried about losing your housing? No   Lack of transportation? No   Unable to get utilities (heat,electricity)? No         6/11/2025   Fall Risk   Gait Speed Test Interpretation Less than or equal to 5.00 seconds - PASS         6/10/2025   Activities of Daily Living- Home Safety   Needs help with the following daily activites None of the above   Safety concerns in the home None of the above         6/10/2025   Dental   Dentist two times every year? (!) NO         6/10/2025   Hearing Screening   Hearing concerns? (!) I NEED TO ASK PEOPLE TO SPEAK UP OR REPEAT THEMSELVES.    (!) IT'S HARD TO FOLLOW A CONVERSATION IN A NOISY RESTAURANT OR CROWDED ROOM.    (!) TROUBLE UNDERSTANDING SOFT OR WHISPERED SPEECH.    (!) TROUBLE UNDERSTANDING SPEECH ON THE TELEPHONE       Multiple values from one day are sorted in reverse-chronological order         6/10/2025   Driving Risk Screening   Patient/family members have concerns about driving No         6/10/2025   General Alertness/Fatigue Screening   Have you been more tired than usual lately? No         6/10/2025   Urinary Incontinence Screening   Bothered by leaking urine in past 6 months No         Today's PHQ-2 Score:       6/10/2025     5:17 PM   PHQ-2 ( 1999 [employer])   Q1: Little interest or pleasure in doing things 0   Q2: Feeling down, depressed or hopeless 0   PHQ-2 Score 0    Q1: Little interest or pleasure in doing things Not at all   Q2: Feeling down, depressed or hopeless Not at all   PHQ-2 Score 0       Patient-reported         6/10/2025   Substance Use   If I could quit smoking, I would Somewhat agree   I want to quit somking, worry about health affects Somewhat agree   Willing to make a plan to quit smoking Somewhat agree   Willing to cut down before quitting Completely agree   Alcohol more than 3/day or more than 7/wk Not Applicable   Do you have a current opioid prescription?  No   How severe/bad is pain from 1 to 10? 2/10   Do you use any other substances recreationally? No     Social History     Tobacco Use    Smoking status: Every Day     Current packs/day: 0.00     Types: Cigarettes     Start date: 2022     Last attempt to quit: 2023     Years since quittin.8     Passive exposure: Current    Smokeless tobacco: Never    Tobacco comments:     Has 1 cigarette every so often   Vaping Use    Vaping status: Never Used     Current providers sharing in care for this patient include:  Patient Care Team:  López Barahona MD as PCP - General  López Barahona MD as Assigned PCP  BrittneyBurak MD as MD (Urology)  Lacey Young MD as MD (Radiation Oncology)  Linda Ochoa MD as MD (Cardiovascular Disease)  Lacey Young MD as Assigned Cancer Care Provider  Linda Ochoa MD as Assigned Heart and Vascular Provider    The following health maintenance items are reviewed in Epic and correct as of today:  Health Maintenance   Topic Date Due    NICOTINE/TOBACCO CESSATION COUNSELING Q 1 YR  Never done    ANNUAL REVIEW OF HM ORDERS  Never done    COPD ACTION PLAN  Never done    PNEUMOCOCCAL VACCINE 50+ YEARS (3 of 3 - PPSV23, PCV20 or PCV21) 2015    MEDICARE ANNUAL WELLNESS VISIT  2020    LUNG CANCER SCREENING  2024    COVID-19 VACCINE ( season) 2024    BMP  2024    ADVANCE CARE PLANNING  2024    TSH W/FREE T4 REFLEX  2024    DTAP/TDAP/TD VACCINE (2 - Td or Tdap) 2025    INFLUENZA VACCINE (Season Ended) 2025    LIPID  2025    COLORECTAL CANCER SCREENING  2025    FALL RISK ASSESSMENT  2026    DIABETES SCREENING  2026    SPIROMETRY  Completed    HEPATITIS C SCREENING  Completed    PHQ-2 (once per calendar year)  Completed    ZOSTER VACCINE  Completed    RSV VACCINE  Completed    AORTIC ANEURYSM SCREENING (SYSTEM ASSIGNED)  Completed    HPV VACCINE  Aged Out    MENINGITIS VACCINE   "Aged Out     Review of Systems  Constitutional, HEENT, cardiovascular, pulmonary, GI, , musculoskeletal, neuro, skin, endocrine and psych systems are negative, except as otherwise noted.     Objective    Exam  /56 (BP Location: Right arm, Patient Position: Sitting, Cuff Size: Adult Regular)   Pulse 62   Temp 97.8  F (36.6  C)   Resp 16   Ht 1.689 m (5' 6.5\")   Wt 88.5 kg (195 lb)   SpO2 95%   BMI 31.00 kg/m     Estimated body mass index is 31 kg/m  as calculated from the following:    Height as of this encounter: 1.689 m (5' 6.5\").    Weight as of this encounter: 88.5 kg (195 lb).    Physical Exam    General: Alert, in no distress  Skin: No significant lesion seen.  Eyes/nose/throat: Eyes without scleral icterus, eye movements normal, pupils equal and reactive, oropharynx clear, ears with normal TM's  MSK: Neck with good ROM  Lymphatic: Neck without adenopathy or masses  Endocrine: Thyroid with no nodules to palpation  Pulm: Lungs clear to auscultation bilaterally  Cardiac: Heart with regular rate and rhythm, no murmur or gallop  GI: Abdomen obese, soft, nontender. No palpable enlargement of liver or spleen  MSK: Extremities no tenderness or edema  Neuro: Moves all extremities, without focal weakness  Psych: Alert, normal mental status. Normal affect and speech          6/11/2025   Mini Cog   Clock Draw Score 2 Normal   3 Item Recall 3 objects recalled   Mini Cog Total Score 5       Assessment and plan    ADDENDUM:  2 additional issues have surfaced based on laboratory tests June 11, 2025    Type 2 diabetes, hemoglobin A1c 7.0.  I think he has an opportunity to manage the diabetes without additional medication, since he is overweight with body mass index obese level 31.  Will advise him via [employer] message on dietary and lifestyle interventions.    Chronic renal insufficiency that could be on the basis of diabetes plus atherosclerotic vascular disease.  GFR 50.  Continue lisinopril which would help " preserve kidney function long-term.    TSH elevated at about 7.  It is possible that he has been missing some levothyroxine doses.  Will remind him to take levothyroxine properly on empty stomach.  See me in the clinic in about a month and we will recheck      Annual preventive exam   Overall Loy is doing well.  He underwent coronary angiography and angioplasty and stenting of his right coronary artery September 2023 and has done well ever since.  Not experiencing any angina or heart failure symptoms.  He is on maximal medical management including combination of rosuvastatin and ezetimibe, also baby aspirin.  Metoprolol lisinopril for blood pressure and as angina prevention.    Loy told me that he is taking levothyroxine, and therefore will include TSH with his blood work this morning along with lipid panel, comprehensive metabolic panel, blood cell counts, and hemoglobin A1c to screen for diabetes.    Loy has COPD, and again reminded him of the importance of stopping smoking.  For the time being he is going to hold off on getting another lung screening CT scan, but he will let me know if he changes his mind.    Retired teacher     Coronary artery disease, in the context of smoking and COPD, coronary intervention and angioplasty stenting of right coronary artery September 13, 2023  Previous nontransmural myocardial infarction in inferolateral wall from basal to mid segment with endomyocardial scare 40-50% of wall thickness.   History of coronary artery stents placement, 2010    aspirin 81 MG tablet  Rosuvastatin 40 mg daily    1-  1. Normal left ventricular size and systolic performance with a visually  estimated ejection fraction of 60-65%.  2. No significant valvular heart disease is identified on this study.  3. Normal right ventricular size and systolic performance.    9-  1.  Mild in-stent restenosis in proximal one fourth of  mid LAD stent.  Residual lumen appears good.  Mild disease distal  to the stent but no severe stenoses.  2.  Total occlusion of left circumflex after small first OM branch.  Occlusion distance appears at least 3 to 4 cm.  Distal vessel fills from left-sided collaterals.  3.  Large dominant RCA with diffuse disease throughout with focal 90 to 95% stenosis distally just before the PDA takeoff.  4.  Successful PCI entire right coronary from crux to proximal segment with 2 long drug-eluting stents.  SAUL-3 flow flow post PCI with 10% residual narrowing.    8-  Pharmacological Regadenoson stress cardiac MRI is positive for inducible myocardial ischemia in inferolateral wall.     Prostate cancer  tamsulosin (FLOMAX) 0.4 MG capsule   Unfavorable intermediate risk prostate cancer, clinical stage T2 a N0 M0, [place] grade 4+3 = 7 involving 3/15 cores, and a pretherapy PSA of 7.20.  Staging work-up including bone scan, CT abdomen pelvics and MRI scan showed no evidence of lymph nodes and bone metastasis.        1-6-2025  PSA Tumor Marker 0.66     TYPE OF RADIATION THERAPY ADMINISTERED:  7020 cGy in 26 treatments given from 9/20/2023 - 10/27/2023.      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  50 yrs, about 1 ppd     1-6-2023  IMPRESSION:  1.  Couple tiny pulmonary nodules.  2.  Severe coronary calcifications.  3.  Mild airway thickening.  4.  Emphysema  RADIOLOGIST RECOMMENDATION: Continue annual screening with low-dose CT chest in 12 months.     COPD, Chronic bronchitis, with emphysema seen on CT scan  Used to take Spiriva  Experiences Dyspnea on exertion walking half a block. Gets respiratory infection about 1 a year     I filled out a handicap parking application for him, since he must  stop to rest after walking less than 200 feet because of COPD    History of shingles that affected his right forehead and scalp, near the eye, but did not have ocular involvement [facility], [facility]   I told Loy that shingles vaccination could be considered, which she would get at a community pharmacy, since it is paid under the Medicare prescription drug benefit.     Mixed hyperlipidemia, in context of severe coronary disease  Rosuvastatin 40 mg daily  ezetimibe (ZETIA) 10 MG tablet     12-  LDL Cholesterol Calculated 47     Direct Measure HDL 34 Low      Triglycerides 88     Essential hypertension  metoprolol succinate (TOPROL-XL) 50 MG 24 hr tablet  Lisinopril 5 mg daily     Target blood pressure is under 130 systolic when measured at rest in the seated position on the right upper arm.    BP Readings from Last 6 Encounters:   06/11/25 128/56   01/08/25 133/65   12/12/24 136/54   07/10/24 106/50   03/06/24 124/57   01/17/24 119/60     Obesity body mass index 31.  Would like to see him lose about 15 pounds in the first 2023  He told with alcohol consumption is minimal.  He might eat in a restaurant once a month.  I reminded him about the importance of eating slowly, controlling portion size, and identifying problem foods to curtail or limit such starches, sweets, and fried foods.   Wt Readings from Last 5 Encounters:   06/11/25 88.5 kg (195 lb)   01/08/25 84.6 kg (186 lb 8 oz)   12/12/24 84.5 kg (186 lb 4.8 oz)   07/10/24 82.7 kg (182 lb 6.4 oz)   03/06/24 84 kg (185 lb 3.2 oz)     Special screening for malignant neoplasms, colon  12-  COLOGUARD-ABSTRACT Negative      Vaccines  Considering Loy's COPD, lets give him a Prevnar 20 pneumococcal vaccine  Immunization History   Administered Date(s) Administered    COVID-19 12+ (MODERNA) 11/18/2023    COVID-19 Bivalent 18+ (Moderna) 12/08/2022, 02/20/2023    COVID-19 Monovalent 18+ (Moderna) 02/14/2021, 03/14/2021,  10/22/2021, 04/20/2022    Flu, Unspecified 10/28/2015    Influenza (High Dose) Trivalent,PF (Fluzone) 09/20/2017, 11/26/2019    Influenza (IIV3) PF 01/10/2013    Influenza (prior to 2024) 10/27/2015    Influenza Vaccine 65+ (FLUAD) 11/19/2020    Influenza Vaccine 65+ (Fluzone HD) 12/08/2022, 09/08/2023    Influenza, Split Virus, Trivalent, Pf (Fluzone\Fluarix) 10/27/2015    Pneumo Conj 13-V (2010&after) 05/06/2015    Pneumococcal 23 valent 01/01/2010    RSV (Abrysvo) 11/18/2023    TDAP (Adacel,Boostrix) 06/29/2015    Zoster recombinant adjuvanted (Shingrix) 02/20/2023, 04/19/2023    Zoster vaccine, live 01/10/2013       Signed Electronically by: CUATE KING MD

## 2025-06-11 NOTE — PATIENT INSTRUCTIONS
Annual preventive exam   Overall Loy is doing well.  He underwent coronary angiography and angioplasty and stenting of his right coronary artery September 2023 and has done well ever since.  Not experiencing any angina or heart failure symptoms.  He is on maximal medical management including combination of rosuvastatin and ezetimibe, also baby aspirin.  Metoprolol lisinopril for blood pressure and as angina prevention.    Loy told me that he is taking levothyroxine, and therefore will include TSH with his blood work this morning along with lipid panel, comprehensive metabolic panel, blood cell counts, and hemoglobin A1c to screen for diabetes.    Loy has COPD, and again reminded him of the importance of stopping smoking.  For the time being he is going to hold off on getting another lung screening CT scan, but he will let me know if he changes his mind.    Retired teacher     Coronary artery disease, in the context of smoking and COPD, coronary intervention and angioplasty stenting of right coronary artery September 13, 2023  Previous nontransmural myocardial infarction in inferolateral wall from basal to mid segment with endomyocardial scare 40-50% of wall thickness.   History of coronary artery stents placement, 2010    aspirin 81 MG tablet  Rosuvastatin 40 mg daily    1-  1. Normal left ventricular size and systolic performance with a visually  estimated ejection fraction of 60-65%.  2. No significant valvular heart disease is identified on this study.  3. Normal right ventricular size and systolic performance.    9-  1.  Mild in-stent restenosis in proximal one fourth of  mid LAD stent.  Residual lumen appears good.  Mild disease distal to the stent but no severe stenoses.  2.  Total occlusion of left circumflex after small first OM branch.  Occlusion distance appears at least 3 to 4 cm.  Distal vessel fills from left-sided collaterals.  3.  Large dominant RCA with diffuse disease throughout  with focal 90 to 95% stenosis distally just before the PDA takeoff.  4.  Successful PCI entire right coronary from crux to proximal segment with 2 long drug-eluting stents.  SAUL-3 flow flow post PCI with 10% residual narrowing.    8-  Pharmacological Regadenoson stress cardiac MRI is positive for inducible myocardial ischemia in inferolateral wall.     Prostate cancer  tamsulosin (FLOMAX) 0.4 MG capsule   Unfavorable intermediate risk prostate cancer, clinical stage T2 a N0 M0, [place] grade 4+3 = 7 involving 3/15 cores, and a pretherapy PSA of 7.20.  Staging work-up including bone scan, CT abdomen pelvics and MRI scan showed no evidence of lymph nodes and bone metastasis.        1-6-2025  PSA Tumor Marker 0.66     TYPE OF RADIATION THERAPY ADMINISTERED:  7020 cGy in 26 treatments given from 9/20/2023 - 10/27/2023.      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  50 yrs, about 1 ppd     1-6-2023  IMPRESSION:  1.  Couple tiny pulmonary nodules.  2.  Severe coronary calcifications.  3.  Mild airway thickening.  4.  Emphysema  RADIOLOGIST RECOMMENDATION: Continue annual screening with low-dose CT chest in 12 months.     COPD, Chronic bronchitis, with emphysema seen on CT scan  Used to take Spiriva  Experiences Dyspnea on exertion walking half a block. Gets respiratory infection about 1 a year     I filled out a handicap parking application for him, since he must stop to rest after walking less than 200 feet because of COPD    History of shingles that affected his right forehead and scalp, near the eye, but did not have ocular involvement [facility], January 2021   I told Loy that  shingles vaccination could be considered, which she would get at a community pharmacy, since it is paid under the Medicare prescription drug benefit.     Mixed hyperlipidemia, in context of severe coronary disease  Rosuvastatin 40 mg daily  ezetimibe (ZETIA) 10 MG tablet     12-  LDL Cholesterol Calculated 47     Direct Measure HDL 34 Low      Triglycerides 88     Essential hypertension  metoprolol succinate (TOPROL-XL) 50 MG 24 hr tablet  Lisinopril 5 mg daily     Target blood pressure is under 130 systolic when measured at rest in the seated position on the right upper arm.    BP Readings from Last 6 Encounters:   06/11/25 128/56   01/08/25 133/65   12/12/24 136/54   07/10/24 106/50   03/06/24 124/57   01/17/24 119/60     Obesity body mass index 31.  Would like to see him lose about 15 pounds in the first 2023  He told with alcohol consumption is minimal.  He might eat in a restaurant once a month.  I reminded him about the importance of eating slowly, controlling portion size, and identifying problem foods to curtail or limit such starches, sweets, and fried foods.   Wt Readings from Last 5 Encounters:   06/11/25 88.5 kg (195 lb)   01/08/25 84.6 kg (186 lb 8 oz)   12/12/24 84.5 kg (186 lb 4.8 oz)   07/10/24 82.7 kg (182 lb 6.4 oz)   03/06/24 84 kg (185 lb 3.2 oz)     Special screening for malignant neoplasms, colon  12-  COLOGUARD-ABSTRACT Negative      Vaccines  Considering Loy's COPD, lets give him a Prevnar 20 pneumococcal vaccine  Immunization History   Administered Date(s) Administered    COVID-19 12+ (MODERNA) 11/18/2023    COVID-19 Bivalent 18+ (Moderna) 12/08/2022, 02/20/2023    COVID-19 Monovalent 18+ (Moderna) 02/14/2021, 03/14/2021, 10/22/2021, 04/20/2022    Flu, Unspecified 10/28/2015    Influenza (High Dose) Trivalent,PF (Fluzone) 09/20/2017, 11/26/2019    Influenza (IIV3) PF 01/10/2013    Influenza (prior to 2024) 10/27/2015    Influenza Vaccine 65+ (FLUAD) 11/19/2020    Influenza  Vaccine 65+ (Fluzone HD) 12/08/2022, 09/08/2023    Influenza, Split Virus, Trivalent, Pf (Fluzone\Fluarix) 10/27/2015    Pneumo Conj 13-V (2010&after) 05/06/2015    Pneumococcal 23 valent 01/01/2010    RSV (Abrysvo) 11/18/2023    TDAP (Adacel,Boostrix) 06/29/2015    Zoster recombinant adjuvanted (Shingrix) 02/20/2023, 04/19/2023    Zoster vaccine, live 01/10/2013

## 2025-06-11 NOTE — Clinical Note
Tell Loy that I want to see him in the clinic in 1 month, not 6.please help him schedule.   The earlier follow-up visit is because of the lab test results indicating mild type 2 diabetes, TSH thyroid test out of range, and reduced kidney function.  Remind him to read the details in [employer] message associated with his test results

## 2025-07-01 DIAGNOSIS — C61 PROSTATE CANCER (H): ICD-10-CM

## 2025-07-01 DIAGNOSIS — E78.5 DYSLIPIDEMIA: ICD-10-CM

## 2025-07-01 RX ORDER — TAMSULOSIN HYDROCHLORIDE 0.4 MG/1
0.4 CAPSULE ORAL DAILY
Qty: 60 CAPSULE | Refills: 1 | Status: SHIPPED | OUTPATIENT
Start: 2025-07-01

## 2025-07-01 RX ORDER — ROSUVASTATIN CALCIUM 40 MG/1
40 TABLET, COATED ORAL DAILY
Qty: 90 TABLET | Refills: 2 | Status: SHIPPED | OUTPATIENT
Start: 2025-07-01

## 2025-07-03 ENCOUNTER — LAB (OUTPATIENT)
Dept: INFUSION THERAPY | Facility: HOSPITAL | Age: 75
End: 2025-07-03
Attending: STUDENT IN AN ORGANIZED HEALTH CARE EDUCATION/TRAINING PROGRAM
Payer: MEDICARE

## 2025-07-03 DIAGNOSIS — C61 PROSTATE CANCER (H): ICD-10-CM

## 2025-07-03 LAB — PSA SERPL DL<=0.01 NG/ML-MCNC: 0.48 NG/ML (ref 0–6.5)

## 2025-07-03 PROCEDURE — 36415 COLL VENOUS BLD VENIPUNCTURE: CPT

## 2025-07-03 PROCEDURE — 84153 ASSAY OF PSA TOTAL: CPT

## 2025-07-08 ENCOUNTER — OFFICE VISIT (OUTPATIENT)
Dept: RADIATION ONCOLOGY | Facility: CLINIC | Age: 75
End: 2025-07-08
Attending: RADIOLOGY
Payer: MEDICARE

## 2025-07-08 VITALS
OXYGEN SATURATION: 94 % | RESPIRATION RATE: 20 BRPM | DIASTOLIC BLOOD PRESSURE: 56 MMHG | SYSTOLIC BLOOD PRESSURE: 118 MMHG | HEART RATE: 56 BPM

## 2025-07-08 DIAGNOSIS — C61 PROSTATE CANCER (H): Primary | ICD-10-CM

## 2025-07-08 PROCEDURE — G0463 HOSPITAL OUTPT CLINIC VISIT: HCPCS | Performed by: RADIOLOGY

## 2025-07-08 PROCEDURE — 1126F AMNT PAIN NOTED NONE PRSNT: CPT | Performed by: RADIOLOGY

## 2025-07-08 PROCEDURE — 3074F SYST BP LT 130 MM HG: CPT | Performed by: RADIOLOGY

## 2025-07-08 PROCEDURE — 99213 OFFICE O/P EST LOW 20 MIN: CPT | Performed by: RADIOLOGY

## 2025-07-08 PROCEDURE — 3078F DIAST BP <80 MM HG: CPT | Performed by: RADIOLOGY

## 2025-07-08 PROCEDURE — G2211 COMPLEX E/M VISIT ADD ON: HCPCS | Performed by: RADIOLOGY

## 2025-07-08 NOTE — PROGRESS NOTES
[facility] Radiation Oncology Follow Up     Patient: Loy Reid  MRN: [id number]  Date of Service: 07/08/2025       DISEASE TREATED:  Unfavorable intermediate risk prostate cancer, clinical stage [id number], [place] grade 4+3 = 7 involving 3/15 cores, and a pretherapy PSA of 7.20.  Staging work-up including bone scan, CT abdomen pelvics and MRI scan showed no evidence of lymph nodes and bone metastasis.         TYPE OF RADIATION THERAPY ADMINISTERED:  7020 cGy in 26 treatments given from 9/20/2023 - 10/27/2023.      INTERVAL SINCE COMPLETION OF RADIATION THERAPY:  1 year and 10 months.      SUBJECTIVE:  Mr. Reid is a 74 year old male with other medical condition including COPD, coronary artery disease and history of MI with stents placement in 2010.  The patient presented with history of elevation of PSA to 7.20 on 12/8/2022 for which he was a seeking further evaluation.  MRI of pelvics on 3/6/2023 showed 30 g prostate gland with a 21 x 21 mm PI-RADS 5 lesion in the right and the left base and mid gland transitional zone at the 10-1 o'clock position suspicious for malignancy.  There is a long segment capsular abutment indicating moderate suspicious of minimum extraprostatic extension.  There is no evidence of lymph nodes and bone metastasis.  Patient underwent ultrasound-guided needle biopsy on 5/5/2023.  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  The remaining 12 cores of biopsy was negative. Staging work-up including bone scan, CT abdomen pelvics and MRI scan showed no evidence of lymph nodes and bone metastasis.  The patient received definitive radiation therapy for his prostate cancer with a total dose of 7020 cGy in 26 treatments given from 9/20/2023 - 10/27/2023.  He tolerated radiation therapy well with minimal side effect.        The patient has been recovering well since surgery.   His bowel and bladder functions has been returned to his normal status. The patient still has a chronic dysuria and has been taking Flomax with moderate relief.  The patient is here for routine post therapy office follow-up.     Medications were reviewed and are up to date on EPIC.    The following portions of the patient's history were reviewed and updated as appropriate: allergies, current medications, past family history, past medical history, past social history, past surgical history and problem list.    Review of Systems:      General  Constitutional  Constitutional (WDL): Exceptions to WDL  Fatigue: Fatigue relieved by rest  EENT  Eye Disorders  Eye Disorder (WDL): All eye disorder elements are within defined limits (wears glasses)  Ear Disorders  Ear Disorder (WDL): All ear disorder elements are within defined limits  Respiratory  Respiratory  Respiratory (WDL): Exceptions to WDL  Cough: Mild symptoms OR nonprescription intervention indicated  Dyspnea: Shortness of breath with moderate exertion  Cardiovascular  Cardiovascular  Cardiovascular (WDL): All cardiovascular elements are within defined limits (Hx of MI, no pacemaker)  Gastrointestinal  Gastrointestinal  Gastrointestinal (WDL): All gastrointestinal elements are within defined limits  Musculoskeletal  Musculoskeletal and Connective Tissue Disorders  Musculoskeletal & Connective (WDL): All musculoskeletal & connective elements are within defined limits  Integumentary  Integumentary  Integumentary (WDL): All integumentary elements are within defined limits  Neurological  Neurosensory  Neurosensory (WDL): All neurosensory elements are within defined limits  Genitourinary/Reproductive  Genitourinary  Genitourinary (WDL): Exceptions to WDL  Urinary Frequency: Present (nocturia x1-2)  Lymphatic  Lymph System Disorders  Lymph (WDL): All lymph elements are within defined limits  Pain  Pain Score: No Pain (0)  AUA Assessment                                                               Accompanied by  Accompanied By: spouse (wife)    Objective:      PHYSICAL EXAMINATION:    /56   Pulse 56   Resp 20   SpO2 94%     Gen: Alert, in NAD  Eyes: PERRL, EOMI, sclera anicteric  Pulm: No wheezing, stridor or respiratory distress  CV: Well-perfused, no cyanosis, no pedal edema  Back: No step-offs or pain to palpation along the thoracolumbar spine  Rectal: Deferred  : Deferred  Musculoskeletal: Normal muscle bulk and tone  Skin: Normal color and turgor  Neurologic: A/Ox3, CN II-XII intact, normal gait and station  Psychiatric: Appropriate mood and affect     Prostate Specific Antigen Screen   Date Value Ref Range Status   12/08/2022 7.20 (H) 0.00 - 6.50 ng/mL Final   11/26/2019 6.9 (H) 0.0 - 4.5 ng/mL Final     PSA Tumor Marker   Date Value Ref Range Status   07/03/2025 0.48 0.00 - 6.50 ng/mL Final   01/06/2025 0.66 0.00 - 6.50 ng/mL Final   07/08/2024 1.48 0.00 - 6.50 ng/mL Final   02/21/2024 3.06 0.00 - 6.50 ng/mL Final   11/27/2023 6.18 0.00 - 6.50 ng/mL Final   06/08/2023 6.76 (H) 0.00 - 6.50 ng/mL Final      Impression     74 year old male with a diagnosis of unfavorable intermediate risk prostate cancer, clinical stage T2 a N0 M0, [place] grade 4+3 = 7 involving 3/15 cores, and a pretherapy PSA of 7.20.  Staging work-up including bone scan, CT abdomen pelvics and MRI scan showed no evidence of lymph nodes and bone metastasis.  The patient received definitive radiation therapy for his prostate cancer with a total dose of 7020 cGy in 26 treatments given from 9/20/2023 - 10/27/2023. AUA: 16/35     Assessment & Plan:     1.  The patient has been stable since radiation therapy with mild to moderate improvement of his urinary function.  His PSA showed additional decline at this time.  We will check his PSA again in 6 months and office follow-up     2.  Continue follow-up with Dr. López Barahona, PCP and Dr. Lugo, urology as planned.     Pain Management Plan: NA     Face to  face time  20 minutes with > 80% spent on consultation, education and coordination of care.         Lacey Young MD  Department of Radiation Oncology   [facility] Radiation Oncology  Tel: [phone]  Page: [phone]    [facility]  [address] [place] [address]     [facility]   [address] [place] [address]    CC:  Patient Care Team:  López Barahona MD as PCP - General  López Barahona MD as Assigned PCP  Burak Lugo MD as MD (Urology)  Lacey Young MD as MD (Radiation Oncology)  Linda Ochoa MD as MD (Cardiovascular Disease)  Lacey Young MD as Assigned Cancer Care Provider  Linda Ochoa MD as Assigned Heart and Vascular Provider

## 2025-07-08 NOTE — PROGRESS NOTES
"Oncology Rooming Note    July 8, 2025 9:58 AM   Loy Reid is a 74 year old male who presents for:    Chief Complaint   Patient presents with    Oncology Clinic Visit    Prostate Cancer     Rad Onc follow up     Initial Vitals: /56   Pulse 56   Resp 20   SpO2 94%  Estimated body mass index is 31 kg/m  as calculated from the following:    Height as of 6/11/25: 1.689 m (5' 6.5\").    Weight as of 6/11/25: 88.5 kg (195 lb). There is no height or weight on file to calculate BSA.  No Pain (0) Comment: Data Unavailable   No LMP for male patient.  Allergies reviewed: Yes  Medications reviewed: Yes    Medications: Medication refills not needed today.  Pharmacy name entered into [facility]: [id number] [address] [place] [facility] [place] [address]    Frailty Screening:   Is the patient here for a new oncology consult visit in cancer care? 2. No    PHQ9:  Did this patient require a PHQ9?: No      Clinical concerns: Feeling okay, no changes.  Dr. Young was notified.      Nae Escalera RN              "

## 2025-07-08 NOTE — LETTER
7/8/2025      Loy Reid  [address] [address]      Dear Colleague,    Thank you for referring your patient, Loy Reid, to the [facility]ATION [facility]. Please see a copy of my visit note below.    [facility] Radiation Oncology Follow Up     Patient: Loy Reid  MRN: [id number]  Date of Service: 07/08/2025       DISEASE TREATED:  Unfavorable intermediate risk prostate cancer, clinical stage [id number], [place] grade 4+3 = 7 involving 3/15 cores, and a pretherapy PSA of 7.20.  Staging work-up including bone scan, CT abdomen pelvics and MRI scan showed no evidence of lymph nodes and bone metastasis.         TYPE OF RADIATION THERAPY ADMINISTERED:  7020 cGy in 26 treatments given from 9/20/2023 - 10/27/2023.      INTERVAL SINCE COMPLETION OF RADIATION THERAPY:  1 year and 10 months.      SUBJECTIVE:  Mr. Reid is a 74 year old male with other medical condition including COPD, coronary artery disease and history of MI with stents placement in 2010.  The patient presented with history of elevation of PSA to 7.20 on 12/8/2022 for which he was a seeking further evaluation.  MRI of pelvics on 3/6/2023 showed 30 g prostate gland with a 21 x 21 mm PI-RADS 5 lesion in the right and the left base and mid gland transitional zone at the 10-1 o'clock position suspicious for malignancy.  There is a long segment capsular abutment indicating moderate suspicious of minimum extraprostatic extension.  There is no evidence of lymph nodes and bone metastasis.  Patient underwent ultrasound-guided needle biopsy on 5/5/2023.  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  The remaining 12 cores of biopsy was negative. Staging work-up including bone scan, CT abdomen pelvics and MRI scan showed no evidence of lymph nodes and bone metastasis.  The patient received  definitive radiation therapy for his prostate cancer with a total dose of 7020 cGy in 26 treatments given from 9/20/2023 - 10/27/2023.  He tolerated radiation therapy well with minimal side effect.        The patient has been recovering well since surgery.  His bowel and bladder functions has been returned to his normal status. The patient still has a chronic dysuria and has been taking Flomax with moderate relief.  The patient is here for routine post therapy office follow-up.     Medications were reviewed and are up to date on EPIC.    The following portions of the patient's history were reviewed and updated as appropriate: allergies, current medications, past family history, past medical history, past social history, past surgical history and problem list.    Review of Systems:      General  Constitutional  Constitutional (WDL): Exceptions to WDL  Fatigue: Fatigue relieved by rest  EENT  Eye Disorders  Eye Disorder (WDL): All eye disorder elements are within defined limits (wears glasses)  Ear Disorders  Ear Disorder (WDL): All ear disorder elements are within defined limits  Respiratory  Respiratory  Respiratory (WDL): Exceptions to WDL  Cough: Mild symptoms OR nonprescription intervention indicated  Dyspnea: Shortness of breath with moderate exertion  Cardiovascular  Cardiovascular  Cardiovascular (WDL): All cardiovascular elements are within defined limits (Hx of MI, no pacemaker)  Gastrointestinal  Gastrointestinal  Gastrointestinal (WDL): All gastrointestinal elements are within defined limits  Musculoskeletal  Musculoskeletal and Connective Tissue Disorders  Musculoskeletal & Connective (WDL): All musculoskeletal & connective elements are within defined limits  Integumentary  Integumentary  Integumentary (WDL): All integumentary elements are within defined limits  Neurological  Neurosensory  Neurosensory (WDL): All neurosensory elements are within defined  limits  Genitourinary/Reproductive  Genitourinary  Genitourinary (WDL): Exceptions to WDL  Urinary Frequency: Present (nocturia x1-2)  Lymphatic  Lymph System Disorders  Lymph (WDL): All lymph elements are within defined limits  Pain  Pain Score: No Pain (0)  AUA Assessment                                                              Accompanied by  Accompanied By: spouse (wife)    Objective:      PHYSICAL EXAMINATION:    /56   Pulse 56   Resp 20   SpO2 94%     Gen: Alert, in NAD  Eyes: PERRL, EOMI, sclera anicteric  Pulm: No wheezing, stridor or respiratory distress  CV: Well-perfused, no cyanosis, no pedal edema  Back: No step-offs or pain to palpation along the thoracolumbar spine  Rectal: Deferred  : Deferred  Musculoskeletal: Normal muscle bulk and tone  Skin: Normal color and turgor  Neurologic: A/Ox3, CN II-XII intact, normal gait and station  Psychiatric: Appropriate mood and affect     Prostate Specific Antigen Screen   Date Value Ref Range Status   12/08/2022 7.20 (H) 0.00 - 6.50 ng/mL Final   11/26/2019 6.9 (H) 0.0 - 4.5 ng/mL Final     PSA Tumor Marker   Date Value Ref Range Status   07/03/2025 0.48 0.00 - 6.50 ng/mL Final   01/06/2025 0.66 0.00 - 6.50 ng/mL Final   07/08/2024 1.48 0.00 - 6.50 ng/mL Final   02/21/2024 3.06 0.00 - 6.50 ng/mL Final   11/27/2023 6.18 0.00 - 6.50 ng/mL Final   06/08/2023 6.76 (H) 0.00 - 6.50 ng/mL Final      Impression     74 year old male with a diagnosis of unfavorable intermediate risk prostate cancer, clinical stage T2 a N0 M0, [place] grade 4+3 = 7 involving 3/15 cores, and a pretherapy PSA of 7.20.  Staging work-up including bone scan, CT abdomen pelvics and MRI scan showed no evidence of lymph nodes and bone metastasis.  The patient received definitive radiation therapy for his prostate cancer with a total dose of 7020 cGy in 26 treatments given from 9/20/2023 - 10/27/2023. AUA: 16/35     Assessment & Plan:     1.  The patient has been stable since  "radiation therapy with mild to moderate improvement of his urinary function.  His PSA showed additional decline at this time.  We will check his PSA again in 6 months and office follow-up     2.  Continue follow-up with Dr. López Barahona, PCP and Dr. Lugo, urology as planned.     Pain Management Plan: NA     Face to face time  20 minutes with > 80% spent on consultation, education and coordination of care.         Lacey Young MD  Department of Radiation Oncology   [facility] Radiation Oncology  Tel: [phone]  Page: [phone]    [facility]  [address] [place] [address]     [facility]   [address] [place] [address]    CC:  Patient Care Team:  López Barahona MD as PCP - General  López Barahona MD as Assigned PCP  Burak Lugo MD as MD (Urology)  Lacey Young MD as MD (Radiation Oncology)  Linda Ochoa MD as MD (Cardiovascular Disease)  Lacey Young MD as Assigned Cancer Care Provider  Linda Ochoa MD as Assigned Heart and Vascular Provider      Oncology Rooming Note    July 8, 2025 9:58 AM   Loy Reid is a 74 year old male who presents for:    Chief Complaint   Patient presents with     Oncology Clinic Visit     Prostate Cancer     Rad Onc follow up     Initial Vitals: /56   Pulse 56   Resp 20   SpO2 94%  Estimated body mass index is 31 kg/m  as calculated from the following:    Height as of 6/11/25: 1.689 m (5' 6.5\").    Weight as of 6/11/25: 88.5 kg (195 lb). There is no height or weight on file to calculate BSA.  No Pain (0) Comment: Data Unavailable   No LMP for male patient.  Allergies reviewed: Yes  Medications reviewed: Yes    Medications: Medication refills not needed today.  Pharmacy name entered into [facility]: [id number] [address] AT [facility] [address]    Frailty Screening:   Is the patient here for a new oncology consult visit in cancer care? 2. " No    PHQ9:  Did this patient require a PHQ9?: No      Clinical concerns: Feeling okay, no changes.  Dr. Young was notified.      Nae Escalera RN                Again, thank you for allowing me to participate in the care of your patient.        Sincerely,        Lacey Young MD    Electronically signed

## 2025-07-15 ENCOUNTER — OFFICE VISIT (OUTPATIENT)
Dept: INTERNAL MEDICINE | Facility: CLINIC | Age: 75
End: 2025-07-15
Payer: MEDICARE

## 2025-07-15 VITALS
RESPIRATION RATE: 16 BRPM | BODY MASS INDEX: 29.66 KG/M2 | DIASTOLIC BLOOD PRESSURE: 52 MMHG | HEART RATE: 60 BPM | OXYGEN SATURATION: 96 % | WEIGHT: 189 LBS | HEIGHT: 67 IN | TEMPERATURE: 98.2 F | SYSTOLIC BLOOD PRESSURE: 114 MMHG

## 2025-07-15 DIAGNOSIS — E11.9 TYPE 2 DIABETES MELLITUS WITHOUT COMPLICATION, WITHOUT LONG-TERM CURRENT USE OF INSULIN (H): Primary | ICD-10-CM

## 2025-07-15 DIAGNOSIS — Z87.891 HISTORY OF TOBACCO USE, PRESENTING HAZARDS TO HEALTH: ICD-10-CM

## 2025-07-15 DIAGNOSIS — I10 ESSENTIAL HYPERTENSION: ICD-10-CM

## 2025-07-15 DIAGNOSIS — E03.4 HYPOTHYROIDISM DUE TO ACQUIRED ATROPHY OF THYROID: ICD-10-CM

## 2025-07-15 LAB
ANION GAP SERPL CALCULATED.3IONS-SCNC: 10 MMOL/L (ref 7–15)
BUN SERPL-MCNC: 13.7 MG/DL (ref 8–23)
CALCIUM SERPL-MCNC: 8.9 MG/DL (ref 8.8–10.4)
CHLORIDE SERPL-SCNC: 104 MMOL/L (ref 98–107)
CREAT SERPL-MCNC: 1.2 MG/DL (ref 0.67–1.17)
EGFRCR SERPLBLD CKD-EPI 2021: 64 ML/MIN/1.73M2
EST. AVERAGE GLUCOSE BLD GHB EST-MCNC: 146 MG/DL
GLUCOSE SERPL-MCNC: 110 MG/DL (ref 70–99)
HBA1C MFR BLD: 6.7 % (ref 0–5.6)
HCO3 SERPL-SCNC: 25 MMOL/L (ref 22–29)
POTASSIUM SERPL-SCNC: 4.6 MMOL/L (ref 3.4–5.3)
SODIUM SERPL-SCNC: 139 MMOL/L (ref 135–145)
TSH SERPL DL<=0.005 MIU/L-ACNC: 1.4 UIU/ML (ref 0.3–4.2)

## 2025-07-15 PROCEDURE — 80048 BASIC METABOLIC PNL TOTAL CA: CPT | Performed by: INTERNAL MEDICINE

## 2025-07-15 PROCEDURE — 36415 COLL VENOUS BLD VENIPUNCTURE: CPT | Performed by: INTERNAL MEDICINE

## 2025-07-15 PROCEDURE — G2211 COMPLEX E/M VISIT ADD ON: HCPCS | Performed by: INTERNAL MEDICINE

## 2025-07-15 PROCEDURE — 83036 HEMOGLOBIN GLYCOSYLATED A1C: CPT | Performed by: INTERNAL MEDICINE

## 2025-07-15 PROCEDURE — 99213 OFFICE O/P EST LOW 20 MIN: CPT | Performed by: INTERNAL MEDICINE

## 2025-07-15 PROCEDURE — 3078F DIAST BP <80 MM HG: CPT | Performed by: INTERNAL MEDICINE

## 2025-07-15 PROCEDURE — 84443 ASSAY THYROID STIM HORMONE: CPT | Performed by: INTERNAL MEDICINE

## 2025-07-15 PROCEDURE — 3044F HG A1C LEVEL LT 7.0%: CPT | Performed by: INTERNAL MEDICINE

## 2025-07-15 PROCEDURE — 3074F SYST BP LT 130 MM HG: CPT | Performed by: INTERNAL MEDICINE

## 2025-07-15 NOTE — PROGRESS NOTES
Office Visit - Follow Up   Loy Reid   74 year old male    Date of Visit: 7/15/2025    Chief Complaint   Patient presents with    thyroid check        -------------------------------------------------------------------------------------------------------------------------  Assessment and Plan    Loy comes in for recheck July 15, 2025 and is overall doing okay.  He knows he needs to focus his mind on the stopping smoking project, and he has a new motivation, and that is a new grandchild.    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  I continue to believe that Loy can control the diabetes without addition of medication.  He has managed from a few pounds, so that is a step in the right direction.    Will also check a basic metabolic panel to monitor his kidney function, which I told him is a little below normal, but still plenty to support his body's needs.    I have also ordered for Loy his recheck screening lung CT scan.    Type 2 diabetes, which I told Loy CAN be controlled with diet  6-  Hemoglobin A1C  0.0 - 5.6 % 7.0 High      Obesity body mass index 30  He told with alcohol consumption is minimal.  He might eat in a restaurant once a month.  I reminded him about the importance of eating slowly, controlling portion size, and identifying problem foods to curtail or limit such starches, sweets, and fried foods.   Wt Readings from Last 5 Encounters:   07/15/25 85.7 kg (189 lb)   06/11/25 88.5 kg (195 lb)   01/08/25 84.6 kg (186 lb 8 oz)   12/12/24 84.5 kg (186 lb 4.8 oz)   07/10/24 82.7 kg (182 lb 6.4 oz)      Coronary artery disease, in the context of smoking and COPD, coronary intervention and angioplasty stenting of right coronary artery September 13, 2023  Previous nontransmural myocardial infarction in inferolateral wall from basal to mid segment with endomyocardial scare 40-50% of wall thickness.    History of coronary artery stents placement, 2010     aspirin 81 MG tablet  Rosuvastatin 40 mg daily     1-  1. Normal left ventricular size and systolic performance with a visually  estimated ejection fraction of 60-65%.  2. No significant valvular heart disease is identified on this study.  3. Normal right ventricular size and systolic performance.     9-  1.  Mild in-stent restenosis in proximal one fourth of  mid LAD stent.  Residual lumen appears good.  Mild disease distal to the stent but no severe stenoses.  2.  Total occlusion of left circumflex after small first OM branch.  Occlusion distance appears at least 3 to 4 cm.  Distal vessel fills from left-sided collaterals.  3.  Large dominant RCA with diffuse disease throughout with focal 90 to 95% stenosis distally just before the PDA takeoff.  4.  Successful PCI entire right coronary from crux to proximal segment with 2 long drug-eluting stents.  SAUL-3 flow flow post PCI with 10% residual narrowing.     8-  Pharmacological Regadenoson stress cardiac MRI is positive for inducible myocardial ischemia in inferolateral wall.     Prostate cancer  TYPE OF RADIATION THERAPY ADMINISTERED:  7020 cGy in 26 treatments given from 9/20/2023 - 10/27/2023.    tamsulosin (FLOMAX) 0.4 MG capsule   Unfavorable intermediate risk prostate cancer, clinical stage T2 a N0 M0, [place] grade 4+3 = 7 involving 3/15 cores, and a pretherapy PSA of 7.20.  Staging work-up including bone scan, CT abdomen pelvics and MRI scan showed no evidence of lymph nodes and bone metastasis.         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  50 yrs,  about 1 ppd     1-6-2023  IMPRESSION:  1.  Couple tiny pulmonary nodules.  2.  Severe coronary calcifications.  3.  Mild airway thickening.  4.  Emphysema  RADIOLOGIST RECOMMENDATION: Continue annual screening with low-dose CT chest in 12 months.     COPD, Chronic bronchitis, with emphysema seen on CT scan  Used to take Spiriva  Experiences Dyspnea on exertion walking half a block. Gets respiratory infection about 1 a year     I filled out a handicap parking application for him, since he must stop to rest after walking less than 200 feet because of COPD     Mixed hyperlipidemia, in context of severe coronary disease  Rosuvastatin 40 mg daily  ezetimibe (ZETIA) 10 MG tablet     12-  LDL Cholesterol Calculated 47      Direct Measure HDL 34 Low       Triglycerides 88      Essential hypertension  metoprolol succinate (TOPROL-XL) 50 MG 24 hr tablet  Lisinopril 5 mg daily     Target blood pressure is under 130 systolic when measured at rest in the seated position on the right upper arm.     BP Readings from Last 6 Encounters:   07/15/25 114/52   07/08/25 118/56   06/11/25 128/56   01/08/25 133/65   12/12/24 136/54   07/10/24 106/50     Chronic renal insufficiency that could be on the basis of diabetes plus atherosclerotic vascular disease.  GFR 50.    6-  Creatinine  0.67 - 1.17 mg/dL 1.47 High       GFR Estimate  >60 mL/min/1.73m2 50 Low      History of shingles that affected his right forehead and scalp, near the eye, but did not have ocular involvement when [facility], [facility] 2021   I told Loy that shingles vaccination could be considered, which she would get at a community pharmacy, since it is paid under the Medicare prescription drug benefit.    Special screening for malignant neoplasms, colon  12-  COLOGUACHIDI-ABSTRACT Negative      --------------------------------------------------------------------------------------------------------------------------  History of  Present Illness  This 74 year old old       thyroid check       7/15/2025    10:22 AM   Additional Questions   Roomed by Lona      History of Present Illness         Reason for visit:  Recheck labs per doctor instructions due to prior abnormal results       Wt Readings from Last 3 Encounters:   07/15/25 85.7 kg (189 lb)   25 88.5 kg (195 lb)   25 84.6 kg (186 lb 8 oz)     BP Readings from Last 3 Encounters:   07/15/25 114/52   25 118/56   25 128/56     ---------------------------------------------------------------------------------------------------------------------------    Medications, Allergies, Social, and Problem List     Current Outpatient Medications   Medication Sig Dispense Refill    albuterol (PROAIR HFA/PROVENTIL HFA/VENTOLIN HFA) 108 (90 Base) MCG/ACT inhaler Inhale 2 puffs into the lungs every 6 hours as needed for shortness of breath, wheezing or cough. 18 g 3    aspirin 81 MG EC tablet Take 81 mg by mouth daily      ezetimibe (ZETIA) 10 MG tablet Take 1 tablet (10 mg) by mouth daily. 90 tablet 3    levothyroxine (SYNTHROID/LEVOTHROID) 112 MCG tablet TAKE 1 TABLET(112 MCG) BY MOUTH DAILY 90 tablet 1    lisinopril (ZESTRIL) 5 MG tablet Take 1 tablet (5 mg) by mouth daily. 90 tablet 3    metoprolol succinate ER (TOPROL XL) 50 MG 24 hr tablet TAKE 1 TABLET(50 MG) BY MOUTH DAILY 90 tablet 1    nitroGLYcerin (NITROSTAT) 0.3 MG sublingual tablet Place 1 tablet (0.3 mg) under the tongue every 5 minutes as needed for chest pain. 20 tablet 3    rosuvastatin (CRESTOR) 40 MG tablet Take 1 tablet (40 mg) by mouth daily. 90 tablet 2    tamsulosin (FLOMAX) 0.4 MG capsule Take 1 capsule (0.4 mg) by mouth daily. 60 capsule 1     No Known Allergies  Social History     Tobacco Use    Smoking status: Every Day     Current packs/day: 0.00     Types: Cigarettes     Start date: 2022     Last attempt to quit: 2023     Years since quittin.9     Passive exposure: Current    Smokeless tobacco:  "Never    Tobacco comments:     Has 1 cigarette every so often   Vaping Use    Vaping status: Never Used     Patient Active Problem List   Diagnosis    Coronary artery disease involving native coronary artery    S/P drug eluting coronary stent placement    Mixed hyperlipidemia    Essential hypertension    Tobacco abuse    COPD (chronic obstructive pulmonary disease) (H)    Class 1 obesity due to excess calories without serious comorbidity with body mass index (BMI) of 31.0 to 31.9 in adult    Other specified hypothyroidism    Prostate cancer (H)    Hypothyroidism due to acquired atrophy of thyroid    SOB (shortness of breath)    Abnormal cardiovascular stress test    Status post coronary angiogram    Pre-procedure lab exam    PVC's (premature ventricular contractions)        Reviewed, reconciled and updated       Physical Exam   General Appearance:       /52 (BP Location: Right arm, Patient Position: Sitting, Cuff Size: Adult Regular)   Pulse 60   Temp 98.2  F (36.8  C)   Resp 16   Ht 1.689 m (5' 6.5\")   Wt 85.7 kg (189 lb)   SpO2 96%   BMI 30.05 kg/m      Loy appeared generally well     Additional Information   I spent 20 minutes on this encounter, including reviewing interval history since last visit, examining the patient, explaining and counseling the issues enumerated in the Assessment and Plan (patient given a copy), ordering indicated tests, ordering prescriptions     The longitudinal plan of care for the diagnosis(es)/condition(s) as documented were addressed during this visit. Due to the added complexity in care, I will continue to support Loy in the subsequent management and with ongoing continuity of care.       CUATE KING MD, MD        Signed Electronically by: CUATE KING MD    "

## 2025-07-15 NOTE — PATIENT INSTRUCTIONS
Loy comes in for recheck July 15, 2025 and is overall doing okay.  He knows he needs to focus his mind on the stopping smoking project, and he has a new motivation, and that is a new grandchild.    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  I continue to believe that Loy can control the diabetes without addition of medication.  He has managed from a few pounds, so that is a step in the right direction.    Will also check a basic metabolic panel to monitor his kidney function, which I told him is a little below normal, but still plenty to support his body's needs.    I have also ordered for Loy his recheck screening lung CT scan.    Type 2 diabetes, which I told Loy CAN be controlled with diet  6-  Hemoglobin A1C  0.0 - 5.6 % 7.0 High      Obesity body mass index 30  He told with alcohol consumption is minimal.  He might eat in a restaurant once a month.  I reminded him about the importance of eating slowly, controlling portion size, and identifying problem foods to curtail or limit such starches, sweets, and fried foods.   Wt Readings from Last 5 Encounters:   07/15/25 85.7 kg (189 lb)   06/11/25 88.5 kg (195 lb)   01/08/25 84.6 kg (186 lb 8 oz)   12/12/24 84.5 kg (186 lb 4.8 oz)   07/10/24 82.7 kg (182 lb 6.4 oz)      Coronary artery disease, in the context of smoking and COPD, coronary intervention and angioplasty stenting of right coronary artery September 13, 2023  Previous nontransmural myocardial infarction in inferolateral wall from basal to mid segment with endomyocardial scare 40-50% of wall thickness.   History of coronary artery stents placement, 2010     aspirin 81 MG tablet  Rosuvastatin 40 mg daily     1-  1. Normal left ventricular size and systolic performance with a visually  estimated ejection fraction of 60-65%.  2. No significant valvular heart disease is identified on this study.  3.  Normal right ventricular size and systolic performance.     9-  1.  Mild in-stent restenosis in proximal one fourth of  mid LAD stent.  Residual lumen appears good.  Mild disease distal to the stent but no severe stenoses.  2.  Total occlusion of left circumflex after small first OM branch.  Occlusion distance appears at least 3 to 4 cm.  Distal vessel fills from left-sided collaterals.  3.  Large dominant RCA with diffuse disease throughout with focal 90 to 95% stenosis distally just before the PDA takeoff.  4.  Successful PCI entire right coronary from crux to proximal segment with 2 long drug-eluting stents.  SAUL-3 flow flow post PCI with 10% residual narrowing.     8-  Pharmacological Regadenoson stress cardiac MRI is positive for inducible myocardial ischemia in inferolateral wall.     Prostate cancer  TYPE OF RADIATION THERAPY ADMINISTERED:  7020 cGy in 26 treatments given from 9/20/2023 - 10/27/2023.    tamsulosin (FLOMAX) 0.4 MG capsule   Unfavorable intermediate risk prostate cancer, clinical stage T2 a N0 M0, [place] grade 4+3 = 7 involving 3/15 cores, and a pretherapy PSA of 7.20.  Staging work-up including bone scan, CT abdomen pelvics and MRI scan showed no evidence of lymph nodes and bone metastasis.         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  50 yrs, about 1 ppd     1-6-2023  IMPRESSION:  1.  Couple tiny pulmonary nodules.  2.  Severe coronary calcifications.  3.  Mild airway thickening.  4.  Emphysema  RADIOLOGIST RECOMMENDATION: Continue annual screening with low-dose CT chest in 12 months.     COPD, Chronic bronchitis, with emphysema seen on CT  scan  Used to take Spiriva  Experiences Dyspnea on exertion walking half a block. Gets respiratory infection about 1 a year     I filled out a handicap parking application for him, since he must stop to rest after walking less than 200 feet because of COPD     Mixed hyperlipidemia, in context of severe coronary disease  Rosuvastatin 40 mg daily  ezetimibe (ZETIA) 10 MG tablet     12-  LDL Cholesterol Calculated 47      Direct Measure HDL 34 Low       Triglycerides 88      Essential hypertension  metoprolol succinate (TOPROL-XL) 50 MG 24 hr tablet  Lisinopril 5 mg daily     Target blood pressure is under 130 systolic when measured at rest in the seated position on the right upper arm.     BP Readings from Last 6 Encounters:   07/15/25 114/52   07/08/25 118/56   06/11/25 128/56   01/08/25 133/65   12/12/24 136/54   07/10/24 106/50     Chronic renal insufficiency that could be on the basis of diabetes plus atherosclerotic vascular disease.  GFR 50.    6-  Creatinine  0.67 - 1.17 mg/dL 1.47 High       GFR Estimate  >60 mL/min/1.73m2 50 Low      History of shingles that affected his right forehead and scalp, near the eye, but did not have ocular involvement when [facility], January 2021   I told Loy that shingles vaccination could be considered, which she would get at a community pharmacy, since it is paid under the Medicare prescription drug benefit.    Special screening for malignant neoplasms, colon  12-  COLOGUARD-ABSTRACT Negative

## (undated) DEVICE — CATH BALLOON NC EMERGE 3.50X15MM H7493926715350

## (undated) DEVICE — CATH DIAGNOSTIC RADIAL 5FR TIG 4.0

## (undated) DEVICE — DEVICE INFLATION SYR W/ HEMOSTASIS VALVE 12IN EXT IN4904

## (undated) DEVICE — CATH GUIDING GUIDELINER JR3.5 ST CURVE 5.5FR COAST 5270

## (undated) DEVICE — HEMOSTAT COMPRESSION VASC REGULAR OD24 CM 3524

## (undated) DEVICE — SYR ANGIOGRAPHY MULTIUSE KIT ACIST 014612

## (undated) DEVICE — CATH GUIDING 6FR AL .75 LA6AL75

## (undated) DEVICE — KIT LG BORE TOUHY ACCESS PLUS MAP152

## (undated) DEVICE — CATH BALLOON EMERGE 2.5X15MM H7493918915250

## (undated) DEVICE — CUSTOM PACK CORONARY SAN5BCRHEA

## (undated) DEVICE — SHTH INTRO 0.021IN ID 6FR DIA

## (undated) DEVICE — GUIDEWIRE INQWIRE 3MM J TIP .035 260CM

## (undated) DEVICE — KIT HAND CONTROL ACIST 014644 AR-P54

## (undated) DEVICE — ELECTRODE DEFIB CADENCE 22550R

## (undated) DEVICE — WIRE GUIDE HI-TRQ  WHISPER MS JTIP 0.014"X190CM 1005357HJ

## (undated) RX ORDER — HYDRALAZINE HYDROCHLORIDE 20 MG/ML
INJECTION INTRAMUSCULAR; INTRAVENOUS
Status: DISPENSED
Start: 2023-09-13

## (undated) RX ORDER — CLOPIDOGREL 300 MG/1
TABLET, FILM COATED ORAL
Status: DISPENSED
Start: 2023-09-13

## (undated) RX ORDER — HEPARIN SODIUM 1000 [USP'U]/ML
INJECTION, SOLUTION INTRAVENOUS; SUBCUTANEOUS
Status: DISPENSED
Start: 2023-09-13

## (undated) RX ORDER — LIDOCAINE HYDROCHLORIDE 10 MG/ML
INJECTION, SOLUTION EPIDURAL; INFILTRATION; INTRACAUDAL; PERINEURAL
Status: DISPENSED
Start: 2023-09-13

## (undated) RX ORDER — FENTANYL CITRATE 50 UG/ML
INJECTION, SOLUTION INTRAMUSCULAR; INTRAVENOUS
Status: DISPENSED
Start: 2023-09-13

## (undated) RX ORDER — DIAZEPAM 5 MG
TABLET ORAL
Status: DISPENSED
Start: 2023-09-13